# Patient Record
Sex: FEMALE | Race: WHITE | NOT HISPANIC OR LATINO | Employment: OTHER | URBAN - METROPOLITAN AREA
[De-identification: names, ages, dates, MRNs, and addresses within clinical notes are randomized per-mention and may not be internally consistent; named-entity substitution may affect disease eponyms.]

---

## 2017-01-18 ENCOUNTER — OFFICE VISIT (OUTPATIENT)
Dept: FAMILY MEDICINE CLINIC | Facility: CLINIC | Age: 72
End: 2017-01-18

## 2017-01-18 VITALS
HEIGHT: 65 IN | BODY MASS INDEX: 42.49 KG/M2 | WEIGHT: 255 LBS | TEMPERATURE: 98.1 F | OXYGEN SATURATION: 92 % | HEART RATE: 48 BPM | DIASTOLIC BLOOD PRESSURE: 70 MMHG | SYSTOLIC BLOOD PRESSURE: 110 MMHG

## 2017-01-18 DIAGNOSIS — I10 ESSENTIAL HYPERTENSION: ICD-10-CM

## 2017-01-18 DIAGNOSIS — Z86.79 HISTORY OF ATRIAL FIBRILLATION: ICD-10-CM

## 2017-01-18 DIAGNOSIS — N28.9 RENAL INSUFFICIENCY: ICD-10-CM

## 2017-01-18 DIAGNOSIS — G47.33 OSA (OBSTRUCTIVE SLEEP APNEA): ICD-10-CM

## 2017-01-18 DIAGNOSIS — E78.2 MIXED HYPERLIPIDEMIA: ICD-10-CM

## 2017-01-18 DIAGNOSIS — E03.9 ACQUIRED HYPOTHYROIDISM: Primary | ICD-10-CM

## 2017-01-18 PROBLEM — E78.5 HYPERLIPIDEMIA: Status: ACTIVE | Noted: 2017-01-18

## 2017-01-18 PROBLEM — I48.0 PAF (PAROXYSMAL ATRIAL FIBRILLATION): Status: ACTIVE | Noted: 2017-01-18

## 2017-01-18 PROCEDURE — 99214 OFFICE O/P EST MOD 30 MIN: CPT | Performed by: PHYSICIAN ASSISTANT

## 2017-01-18 RX ORDER — EZETIMIBE 10 MG/1
10 TABLET ORAL DAILY
Qty: 90 TABLET | Refills: 3 | Status: SHIPPED | OUTPATIENT
Start: 2017-01-18 | End: 2018-01-02 | Stop reason: SDUPTHER

## 2017-01-18 RX ORDER — LEVOTHYROXINE SODIUM 0.05 MG/1
50 TABLET ORAL DAILY
Qty: 90 TABLET | Refills: 3 | Status: SHIPPED | OUTPATIENT
Start: 2017-01-18 | End: 2018-01-02 | Stop reason: SDUPTHER

## 2017-01-18 RX ORDER — METOPROLOL SUCCINATE 25 MG/1
TABLET, EXTENDED RELEASE ORAL
Qty: 30 TABLET | Refills: 0
Start: 2017-01-18 | End: 2017-06-08 | Stop reason: SDUPTHER

## 2017-01-18 RX ORDER — ASPIRIN 81 MG/1
81 TABLET ORAL DAILY
COMMUNITY
End: 2022-12-02

## 2017-01-18 RX ORDER — OMEPRAZOLE 40 MG/1
40 CAPSULE, DELAYED RELEASE ORAL DAILY
Qty: 90 CAPSULE | Refills: 3 | Status: SHIPPED | OUTPATIENT
Start: 2017-01-18 | End: 2018-01-02 | Stop reason: SDUPTHER

## 2017-01-18 NOTE — PROGRESS NOTES
Subjective   Jessica Ames is a 71 y.o. female.     History of Present Illness   Jessica Ames 71 y.o. female who presents today for routine follow up check and medication refills.  she has a history of   Patient Active Problem List   Diagnosis   • Hypertension   • Hyperlipidemia   • ERIC (obstructive sleep apnea)   • PAF (paroxysmal atrial fibrillation)   .  Since the last visit, she has overall felt well.  She has Hypertenision and is well controlled on medication, GERD and is well controlled on PPI medication and Atrial Fibrillation and remains under the care of their cardiologist for medical management.  she has been compliant with current medications have reviewed them.  The patient denies medication side effects.  I must update labs      Has Osteopenia 10-15 and cannot do Evista d/t stroke risk;  Will need DEXA Oct and then if develops Osteoporosis will consider Prolia.    She is seeing Dr Bowman for anemia and has appt next week    Use Cpap/Bipap every night  She is on beta blocker for rate control and bp  I need to update labs    Had Afib June 18 and Nov 11.  The last note from cardio said to let me know if any a fib.  She was on Xarelto.  She is on ASA for now.  I am going to contact Dr. Adame and see if need appt/Xarelto  Lab Results   Component Value Date    HGBA1C 6.2 (H) 01/11/2016     Lab Results   Component Value Date    TSH 2.53 11/25/2015     Lab Results   Component Value Date    GLUCOSE 130 (H) 11/25/2015    BUN 12 01/11/2016    CREATININE 0.83 01/11/2016    EGFRIFNONA 72 01/11/2016    EGFRIFAFRI 83 01/11/2016    BCR 14 01/11/2016    CO2 22 01/11/2016    CALCIUM 8.8 01/11/2016    PROTENTOTREF 6.7 01/11/2016    ALBUMIN 4.2 01/11/2016    LABIL2 1.7 01/11/2016    AST 16 01/11/2016    ALT 14 01/11/2016     I did just call and talked to spouse per BY[facility] about Dr Adame contacting me back and lowered Toprol to 1/2 tab daily d/t bradycardia.  The following portions of the patient's history were reviewed  and updated as appropriate: allergies, current medications, past family history, past medical history, past social history, past surgical history and problem list.    Review of Systems   Constitutional: Negative for activity change, appetite change and unexpected weight change.   HENT: Negative for nosebleeds and trouble swallowing.    Eyes: Negative for pain and visual disturbance.   Respiratory: Negative for chest tightness, shortness of breath and wheezing.    Cardiovascular: Negative for chest pain and palpitations.   Gastrointestinal: Negative for abdominal pain and blood in stool.   Endocrine: Negative.    Genitourinary: Negative for difficulty urinating and hematuria.   Musculoskeletal: Negative for joint swelling.   Skin: Negative for color change and rash.   Allergic/Immunologic: Negative.    Neurological: Negative for syncope and speech difficulty.   Hematological: Negative for adenopathy.   Psychiatric/Behavioral: Negative for agitation and confusion.   All other systems reviewed and are negative.      Objective   Physical Exam   Constitutional: She is oriented to person, place, and time. She appears well-developed and well-nourished. No distress.   HENT:   Head: Normocephalic and atraumatic.   Eyes: Conjunctivae and EOM are normal. Pupils are equal, round, and reactive to light. Right eye exhibits no discharge. Left eye exhibits no discharge. No scleral icterus.   Neck: Normal range of motion. Neck supple. No tracheal deviation present. No thyromegaly present.   Cardiovascular: Normal rate, regular rhythm, normal heart sounds, intact distal pulses and normal pulses.  Exam reveals no gallop.    No murmur heard.  Trace pedal edema = bilat   Pulmonary/Chest: Effort normal and breath sounds normal. No respiratory distress. She has no wheezes. She has no rales.   Musculoskeletal: Normal range of motion.   Lymphadenopathy:     She has no cervical adenopathy.   Neurological: She is alert and oriented to person,  place, and time. She exhibits normal muscle tone. Coordination normal.   Skin: Skin is warm. No rash noted. No erythema. No pallor.   Psychiatric: She has a normal mood and affect. Her behavior is normal. Judgment and thought content normal.   Nursing note and vitals reviewed.      Assessment/Plan   Problems Addressed this Visit        Cardiovascular and Mediastinum    Hypertension    Relevant Orders    Comprehensive metabolic panel    eGFR-Glomerular Filtration    Hemoglobin A1c    Lipid Panel    T4, Free    TSH    Hyperlipidemia    Relevant Medications    ezetimibe (ZETIA) 10 MG tablet    Other Relevant Orders    Comprehensive metabolic panel    eGFR-Glomerular Filtration    Hemoglobin A1c    Lipid Panel    T4, Free    TSH       Respiratory    ERIC (obstructive sleep apnea)    Relevant Orders    Comprehensive metabolic panel    eGFR-Glomerular Filtration    Hemoglobin A1c    Lipid Panel    T4, Free    TSH      Other Visit Diagnoses     Acquired hypothyroidism    -  Primary    Relevant Medications    levothyroxine (SYNTHROID, LEVOTHROID) 50 MCG tablet    Other Relevant Orders    Comprehensive metabolic panel    eGFR-Glomerular Filtration    Hemoglobin A1c    Lipid Panel    T4, Free    TSH    History of atrial fibrillation

## 2017-01-18 NOTE — MR AVS SNAPSHOT
Jessica Ames   1/18/2017 11:30 AM   Office Visit    Provider:  Hortencia Jackman PA-C   Department:  [facility]   Dept Phone:  [phone]                Your Full Care Plan              Today's Medication Changes          These changes are accurate as of: 1/18/17 12:18 PM.  If you have any questions, ask your nurse or doctor.               Medication(s)that have changed:     ezetimibe 10 MG tablet   Commonly known as:  ZETIA   Take 1 tablet by mouth Daily. For cholesterol   What changed:  See the new instructions.   Changed by:  Hortencia Jackman PA-C       levothyroxine 50 MCG tablet   Commonly known as:  SYNTHROID, LEVOTHROID   Take 1 tablet by mouth Daily. One PO daily for thyroid   What changed:  See the new instructions.   Changed by:  Hortencia Jackman PA-C       omeprazole 40 MG capsule   Commonly known as:  priLOSEC   Take 1 capsule by mouth Daily. For GERD   What changed:  See the new instructions.   Changed by:  Hortencia Jackman PA-C         Stop taking medication(s)listed here:     rivaroxaban 20 MG tablet   Commonly known as:  XARELTO   Stopped by:  Hortencia Jackman PA-C           Vitamin D3 2000 UNITS capsule   Stopped by:  Hortencia Jackman PA-C                Where to Get Your Medications      These medications were sent to [facility] - [place], [place] - [address] [place] [address] - [phone] [facility] [phone] [address] [address]     Phone:  [phone]     ezetimibe 10 MG tablet    levothyroxine 50 MCG tablet    omeprazole 40 MG capsule                  Your Updated Medication List          This list is accurate as of: 1/18/17 12:18 PM.  Always use your most recent med list.                aspirin 81 MG EC tablet       bimatoprost 0.01 % ophthalmic drops   Commonly known as:  LUMIGAN       ezetimibe 10 MG tablet   Commonly known as:  ZETIA   Take 1 tablet by mouth Daily. For cholesterol       Iron 28 MG tablet       levothyroxine 50 MCG tablet   Commonly known as:  SYNTHROID, LEVOTHROID   Take 1 tablet by mouth Daily. One PO daily for thyroid       metoprolol succinate XL 25 MG 24 hr tablet   Commonly known as:  TOPROL-XL   Take 1 tablet by mouth daily.       omeprazole 40 MG capsule   Commonly known as:  priLOSEC   Take 1 capsule by mouth Daily. For GERD               We Performed the Following     Comprehensive metabolic panel     eGFR-Glomerular Filtration     Hemoglobin A1c     Lipid Panel     T4, Free     TSH       You Were Diagnosed With        Codes Comments    Acquired hypothyroidism    -  Primary ICD-10-CM: E03.9  ICD-9-CM: 244.9     Essential hypertension     ICD-10-CM: I10  ICD-9-CM: 401.9     ERIC (obstructive sleep apnea)     ICD-10-CM: G47.33  ICD-9-CM: 327.23     Mixed hyperlipidemia     ICD-10-CM: E78.2  ICD-9-CM: 272.2     History of atrial fibrillation     ICD-10-CM: Z86.79  ICD-9-CM: V12.59       Instructions    Low glycemic index diet  Exercise 30 minutes most days of the week  Make sure you get results on any labs or tests we ordered today  We discussed medications and how to take them as prescribed  Sleep 6-8 hours each night if possible  If you have not signed up for [employer], please activate your code ASAP from your After Visit Summary today    LDL goal <100  LDL goal if heart disease <70  HDL goal >60  Triglyceride goal <150  BP goal =<130/80  Fasting glucose <100         Patient Instructions History      [employer] Signup     [facility] [employer] allows you to send messages to your doctor, view your test results, renew your prescriptions, schedule appointments, and more. To sign up, go to [employer] and click on the Sign Up Now link in the New User? box. Enter your [employer] Activation Code exactly as it appears below along with the last four digits of your Social Security Number and your Date of Birth () to complete the sign-up process. If you do not sign up before the expiration date,  "you must request a new code.    [employer] Activation Code: [email or web address]  Expires: 2/1/2017 12:18 PM    If you have questions, you can email [email or web address]) or call [phone] to talk to our [employer] staff. Remember, [employer] is NOT to be used for urgent needs. For medical emergencies, dial 911.               Other Info from Your Visit           Allergies     Adenosine      Celecoxib      Naproxen      Nutritional Supplements      Risedronate      Risedronate Sodium      Shellfish-derived Products      Sulfa Antibiotics        Reason for Visit     Hypothyroidism           Vital Signs     Blood Pressure Pulse Temperature Height Weight Oxygen Saturation    110/70 (BP Location: Left arm, Patient Position: Sitting, Cuff Size: Large Adult) 48 98.1 °F (36.7 °C) (Oral) 65\" (165.1 cm) 255 lb (116 kg) 92%    Body Mass Index Smoking Status                42.43 kg/m2 Never Smoker          Problems and Diagnoses Noted     High cholesterol or triglycerides    High blood pressure    ERIC (obstructive sleep apnea)    Atrial fibrillation (irregular heartbeat)    Acquired underactive thyroid    -  Primary    History of atrial fibrillation          "

## 2017-01-18 NOTE — PATIENT INSTRUCTIONS
Low glycemic index diet  Exercise 30 minutes most days of the week  Make sure you get results on any labs or tests we ordered today  We discussed medications and how to take them as prescribed  Sleep 6-8 hours each night if possible  If you have not signed up for [employer], please activate your code ASAP from your After Visit Summary today    LDL goal <100  LDL goal if heart disease <70  HDL goal >60  Triglyceride goal <150  BP goal =<130/80  Fasting glucose <100

## 2017-01-20 LAB
ALBUMIN SERPL-MCNC: 4.5 G/DL (ref 3.5–5.2)
ALBUMIN/GLOB SERPL: 2 G/DL
ALP SERPL-CCNC: 101 U/L (ref 39–117)
ALT SERPL-CCNC: 22 U/L (ref 1–33)
AST SERPL-CCNC: 20 U/L (ref 1–32)
BILIRUB SERPL-MCNC: 0.6 MG/DL (ref 0.1–1.2)
BUN SERPL-MCNC: 19 MG/DL (ref 8–23)
BUN/CREAT SERPL: 20.4 (ref 7–25)
CALCIUM SERPL-MCNC: 9.3 MG/DL (ref 8.6–10.5)
CHLORIDE SERPL-SCNC: 102 MMOL/L (ref 98–107)
CHOLEST SERPL-MCNC: 215 MG/DL (ref 0–200)
CO2 SERPL-SCNC: 26.9 MMOL/L (ref 22–29)
CREAT SERPL-MCNC: 0.93 MG/DL (ref 0.57–1)
GLOBULIN SER CALC-MCNC: 2.3 GM/DL
GLUCOSE SERPL-MCNC: 113 MG/DL (ref 65–99)
HBA1C MFR BLD: 5.6 % (ref 4.8–5.6)
HDLC SERPL-MCNC: 42 MG/DL (ref 40–60)
LDLC SERPL CALC-MCNC: 133 MG/DL (ref 0–100)
POTASSIUM SERPL-SCNC: 4.2 MMOL/L (ref 3.5–5.2)
PROT SERPL-MCNC: 6.8 G/DL (ref 6–8.5)
SODIUM SERPL-SCNC: 142 MMOL/L (ref 136–145)
T4 FREE SERPL-MCNC: 1.33 NG/DL (ref 0.93–1.7)
TRIGL SERPL-MCNC: 201 MG/DL (ref 0–150)
TSH SERPL DL<=0.005 MIU/L-ACNC: 2.86 MIU/ML (ref 0.27–4.2)
VLDLC SERPL CALC-MCNC: 40.2 MG/DL (ref 5–40)

## 2017-03-05 ENCOUNTER — RESULTS ENCOUNTER (OUTPATIENT)
Dept: FAMILY MEDICINE CLINIC | Facility: CLINIC | Age: 72
End: 2017-03-05

## 2017-03-05 DIAGNOSIS — G47.33 OSA (OBSTRUCTIVE SLEEP APNEA): ICD-10-CM

## 2017-03-05 DIAGNOSIS — E78.2 MIXED HYPERLIPIDEMIA: ICD-10-CM

## 2017-03-05 DIAGNOSIS — N28.9 RENAL INSUFFICIENCY: ICD-10-CM

## 2017-03-05 DIAGNOSIS — I10 ESSENTIAL HYPERTENSION: ICD-10-CM

## 2017-03-05 DIAGNOSIS — Z86.79 HISTORY OF ATRIAL FIBRILLATION: ICD-10-CM

## 2017-03-05 DIAGNOSIS — E03.9 ACQUIRED HYPOTHYROIDISM: ICD-10-CM

## 2017-03-06 LAB
BUN SERPL-MCNC: 12 MG/DL (ref 8–23)
BUN/CREAT SERPL: 16 (ref 7–25)
CALCIUM SERPL-MCNC: 9.2 MG/DL (ref 8.6–10.5)
CHLORIDE SERPL-SCNC: 102 MMOL/L (ref 98–107)
CO2 SERPL-SCNC: 26.3 MMOL/L (ref 22–29)
CREAT SERPL-MCNC: 0.75 MG/DL (ref 0.57–1)
GLUCOSE SERPL-MCNC: 108 MG/DL (ref 65–99)
POTASSIUM SERPL-SCNC: 4.3 MMOL/L (ref 3.5–5.2)
SODIUM SERPL-SCNC: 141 MMOL/L (ref 136–145)

## 2017-05-04 ENCOUNTER — TELEPHONE (OUTPATIENT)
Dept: CARDIOLOGY | Facility: CLINIC | Age: 72
End: 2017-05-04

## 2017-06-08 RX ORDER — METOPROLOL SUCCINATE 25 MG/1
12.5 TABLET, EXTENDED RELEASE ORAL DAILY
Qty: 45 TABLET | Refills: 0 | Status: SHIPPED | OUTPATIENT
Start: 2017-06-08 | End: 2017-09-11 | Stop reason: ALTCHOICE

## 2017-06-28 DIAGNOSIS — Z12.31 ENCOUNTER FOR SCREENING MAMMOGRAM FOR BREAST CANCER: Primary | ICD-10-CM

## 2017-08-17 ENCOUNTER — TELEPHONE (OUTPATIENT)
Dept: CARDIOLOGY | Facility: CLINIC | Age: 72
End: 2017-08-17

## 2017-08-17 ENCOUNTER — OFFICE VISIT (OUTPATIENT)
Dept: FAMILY MEDICINE CLINIC | Facility: CLINIC | Age: 72
End: 2017-08-17

## 2017-08-17 VITALS
DIASTOLIC BLOOD PRESSURE: 84 MMHG | SYSTOLIC BLOOD PRESSURE: 130 MMHG | HEIGHT: 65 IN | OXYGEN SATURATION: 98 % | TEMPERATURE: 98.4 F | BODY MASS INDEX: 44.65 KG/M2 | RESPIRATION RATE: 16 BRPM | HEART RATE: 53 BPM | WEIGHT: 268 LBS

## 2017-08-17 DIAGNOSIS — I10 ESSENTIAL HYPERTENSION: ICD-10-CM

## 2017-08-17 DIAGNOSIS — G47.33 OSA (OBSTRUCTIVE SLEEP APNEA): ICD-10-CM

## 2017-08-17 DIAGNOSIS — R94.31 ABNORMAL EKG: ICD-10-CM

## 2017-08-17 DIAGNOSIS — D68.51 FACTOR V LEIDEN (HCC): ICD-10-CM

## 2017-08-17 DIAGNOSIS — E78.2 MIXED HYPERLIPIDEMIA: ICD-10-CM

## 2017-08-17 DIAGNOSIS — Z86.79 HISTORY OF ATRIAL FIBRILLATION: ICD-10-CM

## 2017-08-17 DIAGNOSIS — I48.0 PAF (PAROXYSMAL ATRIAL FIBRILLATION) (HCC): ICD-10-CM

## 2017-08-17 DIAGNOSIS — Z01.818 PREOPERATIVE CLEARANCE: Primary | ICD-10-CM

## 2017-08-17 DIAGNOSIS — R73.01 IMPAIRED FASTING GLUCOSE: ICD-10-CM

## 2017-08-17 PROCEDURE — 99214 OFFICE O/P EST MOD 30 MIN: CPT | Performed by: PHYSICIAN ASSISTANT

## 2017-08-17 PROCEDURE — 93000 ELECTROCARDIOGRAM COMPLETE: CPT | Performed by: PHYSICIAN ASSISTANT

## 2017-08-17 NOTE — PROGRESS NOTES
Subjective   Jessica Ames is a 72 y.o. female.     History of Present Illness   Pre-Op Evaluation  Jessica Ames is a 72 y.o. female who presents to the office today for a preoperative consultation at the request of surgeon DR Matthews who plans on performing hand surgery and remove thumb joint right on August 31. This consultation is requested for the specific conditions prompting preoperative evaluation (i.e. because of potential affect on operative risk): hx of PAF and abd EKG hx with LAD.. Planned anesthesia is not sure yet. The patient has the following known anesthesia issues: no past complications. Patient has a bleeding risk of: no recent abnormal bleeding. Patient does not have objections to receiving blood products if needed.   She sees Dr. 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 Bowman;---will need clearance for surgery from DR Bowman also d/t Factor V and hx PE    Some tremor in fingers;  Wants to wait to see neurologist      ECG 12 Lead  Date/Time: 8/17/2017 10:45 AM  Performed by: MARLI RESTREPO  Authorized by: MARLI RESTREPO   Comparison: compared with previous ECG from 4/13/2016  Rhythm: sinus bradycardia  Rate: normal  Conduction: conduction normal  ST Segments: ST segments normal  T depression: aVL  T flattening: V6  QRS axis: left  Other findings: early repolarization  Q waves: II  Clinical impression: abnormal ECG  Comments: EKG Interpretation Report    Heart rate:    48 beats/min, ID interval:  210 msec, QRS duration:  132 msec  QTu:444 msec, QTc:  397 msec            Jessica Ames 72 y.o. female who presents today for update on labs.  On Cpap for sleep apnea, hx PAF, hx impaired fasting glucose, hyperlipidemia, HTN., GERD Hypothyroidism;  She has had DVT and PE.  she has  a history of   Patient Active Problem List   Diagnosis   • Hypertension   • Hyperlipidemia   • ERIC (obstructive sleep apnea)   • PAF (paroxysmal atrial fibrillation)   • Impaired fasting glucose   .      Last free T4 was 1.33  Lab Results   Component Value Date    TSH 2.860 01/20/2017     Lab Results   Component Value Date    HGBA1C 5.60 01/20/2017     Lab Results   Component Value Date    GLUCOSE 130 (H) 11/25/2015    CALCIUM 9.2 03/06/2017     03/06/2017    K 4.3 03/06/2017    CO2 26.3 03/06/2017     03/06/2017    BUN 12 03/06/2017    CREATININE 0.75 03/06/2017    EGFRIFAFRI 92 03/06/2017    EGFRIFNONA 76 03/06/2017    BCR 16.0 03/06/2017     Weight is up and no changes;  I will update labs for glucose and thyroid  The following portions of the patient's history were reviewed and updated as appropriate: allergies, current medications, past family history, past medical history, past social history, past surgical history and problem list.        The following portions of the patient's history were reviewed and updated as appropriate: allergies, current medications, past family history, past medical history, past social history, past surgical history and problem list.    Review of Systems   Constitutional: Positive for unexpected weight change. Negative for activity change and appetite change.   HENT: Negative for nosebleeds and trouble swallowing.    Eyes: Negative for pain and visual disturbance.   Respiratory: Negative for chest tightness, shortness of breath and wheezing.    Cardiovascular: Negative for chest pain and palpitations.   Gastrointestinal: Negative for abdominal pain and blood in stool.   Endocrine: Negative.    Genitourinary: Negative for difficulty urinating and hematuria.   Musculoskeletal: Positive for arthralgias. Negative for joint swelling.   Skin: Negative for color change and rash.   Allergic/Immunologic: Negative.    Neurological: Negative for syncope and speech difficulty.    Hematological: Negative for adenopathy.   Psychiatric/Behavioral: Negative for agitation and confusion.   All other systems reviewed and are negative.      Objective   Physical Exam   Constitutional: She is oriented to person, place, and time. She appears well-developed and well-nourished. No distress.   HENT:   Head: Normocephalic and atraumatic.   Eyes: Conjunctivae and EOM are normal. Pupils are equal, round, and reactive to light. Right eye exhibits no discharge. Left eye exhibits no discharge. No scleral icterus.   Neck: Normal range of motion. Neck supple. No tracheal deviation present. No thyromegaly present.   Cardiovascular: Normal rate, regular rhythm, normal heart sounds, intact distal pulses and normal pulses.  Exam reveals no gallop.    No murmur heard.  Trace pedal edema = bilat   Pulmonary/Chest: Effort normal and breath sounds normal. No respiratory distress. She has no wheezes. She has no rales.   Musculoskeletal: Normal range of motion.   Neurological: She is alert and oriented to person, place, and time. She exhibits normal muscle tone. Coordination normal.   Skin: Skin is warm. No rash noted. No erythema. No pallor.   Psychiatric: She has a normal mood and affect. Her behavior is normal. Judgment and thought content normal.   Nursing note and vitals reviewed.      Assessment/Plan   Jessica was seen today for pre-op exam.    Diagnoses and all orders for this visit:    Preoperative clearance  -     ECG 12 Lead  -     Cancel: Ambulatory Referral to Cardiology  -     Comprehensive Metabolic Panel  -     T4, Free  -     TSH  -     Hemoglobin A1c  -     Ambulatory Referral to Cardiology    Abnormal EKG  -     ECG 12 Lead  -     Cancel: Ambulatory Referral to Cardiology  -     Comprehensive Metabolic Panel  -     T4, Free  -     TSH  -     Hemoglobin A1c  -     Ambulatory Referral to Cardiology    History of atrial fibrillation  -     ECG 12 Lead  -     Cancel: Ambulatory Referral to Cardiology  -      Comprehensive Metabolic Panel  -     T4, Free  -     TSH  -     Hemoglobin A1c  -     Ambulatory Referral to Cardiology    Essential hypertension  -     Comprehensive Metabolic Panel  -     T4, Free  -     TSH  -     Hemoglobin A1c    ERIC (obstructive sleep apnea)  -     Comprehensive Metabolic Panel  -     T4, Free  -     TSH  -     Hemoglobin A1c    PAF (paroxysmal atrial fibrillation)  -     Comprehensive Metabolic Panel  -     T4, Free  -     TSH  -     Hemoglobin A1c    Mixed hyperlipidemia  -     Comprehensive Metabolic Panel  -     T4, Free  -     TSH  -     Hemoglobin A1c    Impaired fasting glucose  -     Comprehensive Metabolic Panel  -     T4, Free  -     TSH  -     Hemoglobin A1c    Factor V Leiden  -     Ambulatory Referral to Hematology / Oncology  -     Comprehensive Metabolic Panel  -     T4, Free  -     TSH  -     Hemoglobin A1c

## 2017-08-17 NOTE — PATIENT INSTRUCTIONS
Refer to cardio  Refer to hematologist---------need surgical clearance  Low glycemic index diet  Exercise 30 minutes most days of the week  Make sure you get results on any labs or tests we ordered today  We discussed medications and how to take them as prescribed  Sleep 6-8 hours each night if possible  If you have not signed up for [employer], please activate your code ASAP from your After Visit Summary today    LDL goal <100  LDL goal if heart disease <70  HDL goal >60  Triglyceride goal <150  BP goal =<130/80  Fasting glucose <100

## 2017-08-17 NOTE — TELEPHONE ENCOUNTER
Pt is scheduled to have surgery on her hand 8/31. Her PCP wanted her to see you before for cardiac clearance. I had her scheduled with TK on 8/23 @ 10am...Ilene

## 2017-08-18 LAB
ALBUMIN SERPL-MCNC: 4.4 G/DL (ref 3.5–5.2)
ALBUMIN/GLOB SERPL: 1.8 G/DL
ALP SERPL-CCNC: 92 U/L (ref 39–117)
ALT SERPL-CCNC: 23 U/L (ref 1–33)
AST SERPL-CCNC: 18 U/L (ref 1–32)
BILIRUB SERPL-MCNC: 0.6 MG/DL (ref 0.1–1.2)
BUN SERPL-MCNC: 14 MG/DL (ref 8–23)
BUN/CREAT SERPL: 16.3 (ref 7–25)
CALCIUM SERPL-MCNC: 9.9 MG/DL (ref 8.6–10.5)
CHLORIDE SERPL-SCNC: 106 MMOL/L (ref 98–107)
CO2 SERPL-SCNC: 25.3 MMOL/L (ref 22–29)
CREAT SERPL-MCNC: 0.86 MG/DL (ref 0.57–1)
GLOBULIN SER CALC-MCNC: 2.4 GM/DL
GLUCOSE SERPL-MCNC: 125 MG/DL (ref 65–99)
HBA1C MFR BLD: 6.23 % (ref 4.8–5.6)
POTASSIUM SERPL-SCNC: 4.6 MMOL/L (ref 3.5–5.2)
PROT SERPL-MCNC: 6.8 G/DL (ref 6–8.5)
SODIUM SERPL-SCNC: 144 MMOL/L (ref 136–145)
T4 FREE SERPL-MCNC: 1.38 NG/DL (ref 0.93–1.7)
TSH SERPL DL<=0.005 MIU/L-ACNC: 2.72 MIU/ML (ref 0.27–4.2)

## 2017-08-22 NOTE — PROGRESS NOTES
Called patient and advised of results.   Patient verbalized understanding.  Mailing copy of test results to pt

## 2017-08-23 ENCOUNTER — OFFICE VISIT (OUTPATIENT)
Dept: CARDIOLOGY | Facility: CLINIC | Age: 72
End: 2017-08-23

## 2017-08-23 VITALS
WEIGHT: 265 LBS | SYSTOLIC BLOOD PRESSURE: 140 MMHG | BODY MASS INDEX: 44.1 KG/M2 | DIASTOLIC BLOOD PRESSURE: 88 MMHG | HEART RATE: 48 BPM

## 2017-08-23 DIAGNOSIS — I10 ESSENTIAL HYPERTENSION: ICD-10-CM

## 2017-08-23 DIAGNOSIS — Z01.810 ENCOUNTER FOR PRE-OPERATIVE CARDIOVASCULAR CLEARANCE: Primary | ICD-10-CM

## 2017-08-23 DIAGNOSIS — R94.31 ABNORMAL EKG: ICD-10-CM

## 2017-08-23 DIAGNOSIS — I48.0 PAROXYSMAL ATRIAL FIBRILLATION (HCC): ICD-10-CM

## 2017-08-23 PROCEDURE — 99242 OFF/OP CONSLTJ NEW/EST SF 20: CPT | Performed by: NURSE PRACTITIONER

## 2017-08-23 PROCEDURE — 93000 ELECTROCARDIOGRAM COMPLETE: CPT | Performed by: NURSE PRACTITIONER

## 2017-08-23 RX ORDER — AMINO ACIDS/MV,IRON,MIN
TABLET ORAL DAILY
COMMUNITY
End: 2018-09-25 | Stop reason: ALTCHOICE

## 2017-08-23 NOTE — PROGRESS NOTES
Date of Office Visit: 2017  Encounter Provider: MIRA Haynes  Place of Service: [facility] CARDIOLOGY  Patient Name: Jessica Ames  :1945    Chief Complaint   Patient presents with   • Pre-op Exam   :     HPI: Jessica Ames is a 72 y.o. female who presents today for perioperative cardiac risk assessment. This visit is a consultation requested by Hortencia Jackman PA-C. Mrs. Ames is scheduled to undergo hand/thumb surgery on  by Dr. Matthews. She has a past medical history of anemia, thyroid disease, DVT, pulmonary emboli, Factor V Ledien, GERD, hypertension, hyperlipidemia, sleep apnea, and paroxysmal atrial fibrillation. She is an established patient of Dr. Charlie Adame of our practice and was last seen in the office on 2016. On that visit, she did not have recurrent atrial fibrillation and the Xarelto was discontinued and was recommended to start a baby aspirin.     She denies any sustained episodes of palpitations. She occasionally has brief fluttering sensation daily that lasts a few seconds with no other associated symptoms. She has chronic shortness of breath, unchanged and experiences dizziness on a rare occasion. She denies chest pain, paroxysmal nocturnal dyspnea, orthopnea, cough, wheezing, edema, syncopal episodes, and falls.    Past Medical History:   Diagnosis Date   • Disease of thyroid gland    • DVT (deep venous thrombosis) 2013    right leg; s/p knee replacement; was on xarelto and now baby aspirin; followed by Dr. Parminder Mejia     • Factor V Leiden    • First degree AV block 2012   • GERD (gastroesophageal reflux disease) 2012   • Hiatal hernia    • Hyperlipidemia    • Hypertension    • IFG (impaired fasting glucose)    • Iron deficiency anemia     sees Dr. Clem Bowman    • Left anterior fascicular block 2012   • Lower extremity edema    • Obesity 2012   • ERIC (obstructive sleep apnea)     compliant with CPAP machine    •  "PAF (paroxysmal atrial fibrillation) 11/2015    day after colonoscopy went into atrial fibrillation; was on Xarelto and now baby aspirin    • Pulmonary embolism 2013    also had DVT    • PVC's (premature ventricular contractions) 12/7/2012   • Venous insufficiency     followed by Dr. Parminder Mejia        Past Surgical History:   Procedure Laterality Date   • BREAST SURGERY      reduction   • CATH LAB PROCEDURE     • HAND SURGERY     • HYSTERECTOMY     • ROTATOR CUFF REPAIR     • TOTAL KNEE ARTHROPLASTY         Social History     Social History   • Marital status:      Spouse name: N/A   • Number of children: N/A   • Years of education: N/A     Occupational History   • Not on file.     Social History Main Topics   • Smoking status: Never Smoker   • Smokeless tobacco: Never Used   • Alcohol use Yes      Comment: \" once month\"   • Drug use: No   • Sexual activity: Defer     Other Topics Concern   • Not on file     Social History Narrative       Family History   Problem Relation Age of Onset   • Stroke Mother    • Diabetes Mother    • Hypertension Mother    • Uterine cancer Mother    • Hypertension Father    • Heart attack Father    • Emphysema Father    • Diabetes Sister    • Hypertension Sister    • Stroke Brother    • Diabetes Brother    • Hypertension Brother        Review of Systems   Constitution: Positive for malaise/fatigue and weight gain (10 pounds). Negative for chills, diaphoresis, fever, night sweats and weight loss.   HENT: Negative for hearing loss, nosebleeds, sore throat and tinnitus.    Eyes: Negative for blurred vision, double vision, pain and visual disturbance.   Cardiovascular: Positive for dyspnea on exertion and leg swelling. Negative for chest pain, claudication, cyanosis, irregular heartbeat, near-syncope, orthopnea, palpitations, paroxysmal nocturnal dyspnea and syncope.   Respiratory: Negative for cough, hemoptysis, shortness of breath, snoring and wheezing.    Endocrine: Negative for " cold intolerance, heat intolerance and polyuria.   Hematologic/Lymphatic: Negative for bleeding problem. Does not bruise/bleed easily.   Skin: Negative for color change, dry skin, flushing and itching.   Musculoskeletal: Negative for falls, joint swelling, muscle cramps, muscle weakness and myalgias.   Gastrointestinal: Negative for abdominal pain, constipation, heartburn, melena, nausea and vomiting.   Genitourinary: Negative for dysuria and hematuria.   Neurological: Positive for excessive daytime sleepiness and dizziness (rare). Negative for light-headedness, loss of balance, numbness, paresthesias, seizures and vertigo.   Psychiatric/Behavioral: Negative for altered mental status, depression, memory loss and substance abuse. The patient does not have insomnia and is not nervous/anxious.    Allergic/Immunologic: Negative for environmental allergies.       Allergies   Allergen Reactions   • Adenosine    • Celecoxib    • Naproxen    • Nutritional Supplements    • Risedronate    • Risedronate Sodium    • Shellfish-Derived Products    • Sulfa Antibiotics          Current Outpatient Prescriptions:   •  aspirin 81 MG EC tablet, Take 81 mg by mouth Daily., Disp: , Rfl:   •  bimatoprost (LUMIGAN) 0.01 % ophthalmic drops, Apply  to eye., Disp: , Rfl:   •  ezetimibe (ZETIA) 10 MG tablet, Take 1 tablet by mouth Daily. For cholesterol, Disp: 90 tablet, Rfl: 3  •  levothyroxine (SYNTHROID, LEVOTHROID) 50 MCG tablet, Take 1 tablet by mouth Daily. One PO daily for thyroid, Disp: 90 tablet, Rfl: 3  •  metoprolol succinate XL (TOPROL-XL) 25 MG 24 hr tablet, Take 0.5 tablets by mouth Daily., Disp: 45 tablet, Rfl: 0  •  Multiple Vitamins-Minerals (OCUVITE EXTRA) tablet, Take  by mouth Daily., Disp: , Rfl:   •  omeprazole (priLOSEC) 40 MG capsule, Take 1 capsule by mouth Daily. For GERD, Disp: 90 capsule, Rfl: 3     Objective:     Vitals:    08/23/17 1021 08/23/17 1027   BP: 140/88    BP Location: Left arm    Pulse:  (!) 48    Weight: 265 lb (120 kg)      Body mass index is 44.1 kg/(m^2).    PHYSICAL EXAM:    Vitals Reviewed.   General Appearance: No acute distress, well developed and well nourished. Obese.   Eyes: Conjunctiva and lids: No erythema, swelling, or discharge. Sclera non-icteric.   HENT: Atraumatic, normocephalic. External eyes, ears, and nose normal. No hearing loss noted. Mucous membranes normal. Lips not cyanotic. Neck supple with no tenderness.  Respiratory: No signs of respiratory distress. Respiration rhythm and depth normal.   Clear to auscultation. No rales, crackles, rhonchi, or wheezing auscultated.   Cardiovascular:  Jugular Venous Pressure: Normal  Heart Rate and Rhythm: Normal, bradycardia. Heart Sounds: Normal S1 and S2. No S3 or S4 noted.  Murmurs: No murmurs noted. No rubs, thrills, or gallops.   Arterial Pulses: Carotid pulses normal. No carotid bruit noted. Posterior tibialis and dorsalis pedis pulses normal.   Lower Extremities: No edema noted.  Gastrointestinal:  Abdomen soft, non-distended, non-tender. Normal bowel sounds. No hepatomegaly.   Musculoskeletal: Normal movement of extremities  Skin and Nails: General appearance normal. No pallor, cyanosis, diaphoresis. Skin temperature normal. No clubbing of fingernails.   Psychiatric: Patient alert and oriented to person, place, and time. Speech and behavior appropriate. Normal mood and affect.       ECG 12 Lead  Date/Time: 8/23/2017 10:25 AM  Performed by: GERALD DOS SANTOS  Authorized by: GERALD DOS SANTOS   Comparison: compared with previous ECG from 8/17/2017  Rhythm: sinus bradycardia  Ectopy: atrial premature contractions  Rate: bradycardic  BPM: 48  Conduction: 1st degree and non-specific intraventricular conduction delay  ST Segments: ST segments normal  T Waves: T waves normal  QRS axis: normal  Clinical impression: abnormal ECG  Comments: Unchanged from 4/13/2016              Assessment:       Diagnosis Plan   1. Encounter for pre-operative  cardiovascular clearance     2. Paroxysmal atrial fibrillation     3. Abnormal EKG     4. Essential hypertension            Plan:       1. Perioperative Cardiac Risk Assessment: Mrs. Ames denies any chest pain or sustained palpitations. EKG shows sinus bradycardia. Patient is considered at acceptable risk for surgery from a cardiovascular standpoint. According to the Kaushal's Revised Cardiac Risk Index, patient is considered low risk (0.9%) of adverse cardiovascular events occurring with moderate risk surgery. I have recommended that she take the Toprol XL the am of surgery. She may hold the aspirin to your discretion. I explained there is a chance that she may go back into atrial fibrillation in the surgical period.     2. Atrial Fibrillation and Atrial Flutter  Assessment  • The patient has paroxysmal atrial fibrillation  • The patient's CHADS2-VASc score is 5  • A CTX5ND1-ELWo score of 2 or more is considered a high risk for a thromboembolic event    Plan  • Attempt to maintain sinus rhythm  • Continue aspirin for antithrombotic therapy, bleeding issues discussed  • Continue beta blocker for rhythm control    Subjective - Objective  • On her last visit with Dr. Adame, the Xarelto was discontinued and she was recommended to take baby aspirin. Her heart rate is low today, but she is asymptomatic. She will continue on Toprol at current dosage. I have asked her to call with any sustained palpitations or recurrent atrial fibrillation.     3. EKG: Her EKG has abnormalities as noted above under EKG interpretation; however, these are unchanged.     4. Essential Hypertension: Blood pressure is elevated today. I will defer to PCP.     5. Follow Up: She has been recommended to follow up on an as needed basis by Dr. Adame.     As always, it has been a pleasure to participate in your patient's care.      Sincerely,         MIRA Lamb

## 2017-08-29 NOTE — TELEPHONE ENCOUNTER
Hortencia from [facility] called regarding Ms. Ames.  She is scheduled for thumb joint surgery on 8/31/17.   They would like a cardiac clearance.  Ms. Ames is a pt of Dr. Adame's but Varsha saw this pt last week.  Varsha is out today and not sure if she will be back tomorrow.  Are you able to clear this pt or should I wait and check with Dr. Adame tomorrow when he is back?  I have the pt's chart if you need it with Varsha's notes. Thanks.    Please advise/viktoriya Burnett # [phone]

## 2017-08-30 ENCOUNTER — TELEPHONE (OUTPATIENT)
Dept: CARDIOLOGY | Facility: CLINIC | Age: 72
End: 2017-08-30

## 2017-08-30 NOTE — PROGRESS NOTES
I should have a form in my in basket on my desk for this.  She can hold the Asprin.  I already was sent a note from hematologist and cleared her.   I can make a letter if needs prior to my return Tues.  You could also send this note that she is cleared for surgery from primary care per cardiologist.

## 2017-08-30 NOTE — TELEPHONE ENCOUNTER
Varsha was able to dictate the office note and clearance for Ms. Hui.  I faxed over a copy of the letter to att: Connie at   Fax [phone]    Phone [phone]/[facility]

## 2017-09-11 ENCOUNTER — TELEPHONE (OUTPATIENT)
Dept: CARDIOLOGY | Facility: CLINIC | Age: 72
End: 2017-09-11

## 2017-09-11 RX ORDER — LOSARTAN POTASSIUM 25 MG/1
25 TABLET ORAL DAILY
Qty: 30 TABLET | Refills: 3 | Status: SHIPPED | OUTPATIENT
Start: 2017-09-11 | End: 2017-10-25 | Stop reason: SDUPTHER

## 2017-09-11 RX ORDER — METOPROLOL SUCCINATE 25 MG/1
25 TABLET, EXTENDED RELEASE ORAL DAILY
Qty: 30 TABLET | Refills: 11 | Status: SHIPPED | OUTPATIENT
Start: 2017-09-11 | End: 2017-09-12 | Stop reason: SDUPTHER

## 2017-09-11 RX ORDER — BENAZEPRIL HYDROCHLORIDE 5 MG/1
10 TABLET, FILM COATED ORAL DAILY
Qty: 30 TABLET | Refills: 3 | Status: SHIPPED | OUTPATIENT
Start: 2017-09-11 | End: 2017-09-11

## 2017-09-11 NOTE — TELEPHONE ENCOUNTER
Patient called, informing me that she was wandering if Varsha was going to change her Metoprolol. Patient said she has currently been taking Metoprolol ER 25mg 1/2 daily and her b/p is 153/60 hr 52.  Patient said she was advised to call after her surgery and Varsha might want to change to another b/p medication.  Please advise.  Olga    (Can you please address since Varsha is out.)

## 2017-09-11 NOTE — TELEPHONE ENCOUNTER
[facility] is calling, stating that the patient is allergic to Lisinopril, it's also on our list. The pt does not remember what her reaction to this is, but the pharmacist wanted to bring this to your attention. Also you sent in Benazepril 5 mg take 2 tablets a day, they have a 10 mg tablet if that's what you want to keep her on. Please advise. dmk

## 2017-09-11 NOTE — TELEPHONE ENCOUNTER
Pt informed, that we are changing her to Losartan, she is to continue Metoprolol as well. Called [facility] to cancel the Benazepril rx. dmk

## 2017-09-12 RX ORDER — METOPROLOL SUCCINATE 25 MG/1
25 TABLET, EXTENDED RELEASE ORAL DAILY
Qty: 90 TABLET | Refills: 1 | Status: SHIPPED | OUTPATIENT
Start: 2017-09-12 | End: 2018-02-22 | Stop reason: SDUPTHER

## 2017-10-25 RX ORDER — LOSARTAN POTASSIUM 25 MG/1
25 TABLET ORAL DAILY
Qty: 90 TABLET | Refills: 1 | Status: SHIPPED | OUTPATIENT
Start: 2017-10-25 | End: 2018-02-22 | Stop reason: SDUPTHER

## 2018-01-02 RX ORDER — LEVOTHYROXINE SODIUM 0.05 MG/1
TABLET ORAL
Qty: 90 TABLET | Refills: 0 | Status: SHIPPED | OUTPATIENT
Start: 2018-01-02 | End: 2018-02-22 | Stop reason: SDUPTHER

## 2018-01-02 RX ORDER — OMEPRAZOLE 40 MG/1
CAPSULE, DELAYED RELEASE ORAL
Qty: 90 CAPSULE | Refills: 0 | Status: SHIPPED | OUTPATIENT
Start: 2018-01-02 | End: 2018-02-22

## 2018-01-02 RX ORDER — EZETIMIBE 10 MG/1
TABLET ORAL
Qty: 90 TABLET | Refills: 0 | Status: SHIPPED | OUTPATIENT
Start: 2018-01-02 | End: 2018-02-22 | Stop reason: SDUPTHER

## 2018-02-15 DIAGNOSIS — K21.9 GASTROESOPHAGEAL REFLUX DISEASE WITHOUT ESOPHAGITIS: ICD-10-CM

## 2018-02-15 DIAGNOSIS — I10 ESSENTIAL HYPERTENSION: ICD-10-CM

## 2018-02-15 DIAGNOSIS — E55.9 VITAMIN D DEFICIENCY: ICD-10-CM

## 2018-02-15 DIAGNOSIS — R73.01 IMPAIRED FASTING GLUCOSE: ICD-10-CM

## 2018-02-15 DIAGNOSIS — E78.2 MIXED HYPERLIPIDEMIA: Primary | ICD-10-CM

## 2018-02-19 LAB
25(OH)D3+25(OH)D2 SERPL-MCNC: 16.7 NG/ML (ref 30–100)
ALBUMIN SERPL-MCNC: 4.2 G/DL (ref 3.5–4.8)
ALBUMIN/GLOB SERPL: 1.8 {RATIO} (ref 1.2–2.2)
ALP SERPL-CCNC: 95 IU/L (ref 39–117)
ALT SERPL-CCNC: 21 IU/L (ref 0–32)
AST SERPL-CCNC: 19 IU/L (ref 0–40)
BASOPHILS # BLD AUTO: 0 X10E3/UL (ref 0–0.2)
BASOPHILS NFR BLD AUTO: 0 %
BILIRUB SERPL-MCNC: 0.5 MG/DL (ref 0–1.2)
BUN SERPL-MCNC: 13 MG/DL (ref 8–27)
BUN/CREAT SERPL: 15 (ref 12–28)
CALCIUM SERPL-MCNC: 9.3 MG/DL (ref 8.7–10.3)
CHLORIDE SERPL-SCNC: 104 MMOL/L (ref 96–106)
CHOLEST SERPL-MCNC: 195 MG/DL (ref 100–199)
CO2 SERPL-SCNC: 23 MMOL/L (ref 18–29)
CREAT SERPL-MCNC: 0.84 MG/DL (ref 0.57–1)
EOSINOPHIL # BLD AUTO: 0 X10E3/UL (ref 0–0.4)
EOSINOPHIL NFR BLD AUTO: 0 %
ERYTHROCYTE [DISTWIDTH] IN BLOOD BY AUTOMATED COUNT: 14.8 % (ref 12.3–15.4)
GFR SERPLBLD CREATININE-BSD FMLA CKD-EPI: 70 ML/MIN/1.73
GFR SERPLBLD CREATININE-BSD FMLA CKD-EPI: 80 ML/MIN/1.73
GLOBULIN SER CALC-MCNC: 2.4 G/DL (ref 1.5–4.5)
GLUCOSE SERPL-MCNC: 121 MG/DL (ref 65–99)
HBA1C MFR BLD: 6 % (ref 4.8–5.6)
HCT VFR BLD AUTO: 40.9 % (ref 34–46.6)
HDLC SERPL-MCNC: 39 MG/DL
HGB BLD-MCNC: 13.7 G/DL (ref 11.1–15.9)
IMM GRANULOCYTES # BLD: 0 X10E3/UL (ref 0–0.1)
IMM GRANULOCYTES NFR BLD: 0 %
LDLC SERPL CALC-MCNC: 103 MG/DL (ref 0–99)
LYMPHOCYTES # BLD AUTO: 2.2 X10E3/UL (ref 0.7–3.1)
LYMPHOCYTES NFR BLD AUTO: 39 %
MCH RBC QN AUTO: 28.7 PG (ref 26.6–33)
MCHC RBC AUTO-ENTMCNC: 33.5 G/DL (ref 31.5–35.7)
MCV RBC AUTO: 86 FL (ref 79–97)
MONOCYTES # BLD AUTO: 0.5 X10E3/UL (ref 0.1–0.9)
MONOCYTES NFR BLD AUTO: 9 %
NEUTROPHILS # BLD AUTO: 2.9 X10E3/UL (ref 1.4–7)
NEUTROPHILS NFR BLD AUTO: 52 %
PLATELET # BLD AUTO: 153 X10E3/UL (ref 150–379)
POTASSIUM SERPL-SCNC: 4.1 MMOL/L (ref 3.5–5.2)
PROT SERPL-MCNC: 6.6 G/DL (ref 6–8.5)
RBC # BLD AUTO: 4.77 X10E6/UL (ref 3.77–5.28)
SODIUM SERPL-SCNC: 142 MMOL/L (ref 134–144)
T4 FREE SERPL-MCNC: 1.33 NG/DL (ref 0.82–1.77)
TRIGL SERPL-MCNC: 264 MG/DL (ref 0–149)
TSH SERPL DL<=0.005 MIU/L-ACNC: 3.06 UIU/ML (ref 0.45–4.5)
VLDLC SERPL CALC-MCNC: 53 MG/DL (ref 5–40)
WBC # BLD AUTO: 5.6 X10E3/UL (ref 3.4–10.8)

## 2018-02-22 ENCOUNTER — OFFICE VISIT (OUTPATIENT)
Dept: FAMILY MEDICINE CLINIC | Facility: CLINIC | Age: 73
End: 2018-02-22

## 2018-02-22 VITALS
SYSTOLIC BLOOD PRESSURE: 122 MMHG | DIASTOLIC BLOOD PRESSURE: 80 MMHG | HEIGHT: 65 IN | RESPIRATION RATE: 16 BRPM | TEMPERATURE: 98.3 F | HEART RATE: 51 BPM | BODY MASS INDEX: 44.65 KG/M2 | OXYGEN SATURATION: 97 % | WEIGHT: 268 LBS

## 2018-02-22 DIAGNOSIS — K21.9 GASTROESOPHAGEAL REFLUX DISEASE WITHOUT ESOPHAGITIS: ICD-10-CM

## 2018-02-22 DIAGNOSIS — I10 ESSENTIAL HYPERTENSION: Primary | ICD-10-CM

## 2018-02-22 DIAGNOSIS — E55.9 VITAMIN D DEFICIENCY: ICD-10-CM

## 2018-02-22 DIAGNOSIS — E78.2 MIXED HYPERLIPIDEMIA: ICD-10-CM

## 2018-02-22 DIAGNOSIS — I48.0 PAROXYSMAL ATRIAL FIBRILLATION (HCC): ICD-10-CM

## 2018-02-22 DIAGNOSIS — G47.33 OSA (OBSTRUCTIVE SLEEP APNEA): ICD-10-CM

## 2018-02-22 DIAGNOSIS — R73.01 IMPAIRED FASTING GLUCOSE: ICD-10-CM

## 2018-02-22 DIAGNOSIS — Z78.0 POST-MENOPAUSAL: ICD-10-CM

## 2018-02-22 PROBLEM — H35.30 MACULAR DEGENERATION: Status: ACTIVE | Noted: 2018-02-22

## 2018-02-22 PROBLEM — H40.9 GLAUCOMA: Status: ACTIVE | Noted: 2018-02-22

## 2018-02-22 PROCEDURE — 99214 OFFICE O/P EST MOD 30 MIN: CPT | Performed by: PHYSICIAN ASSISTANT

## 2018-02-22 RX ORDER — OMEPRAZOLE 40 MG/1
40 CAPSULE, DELAYED RELEASE ORAL DAILY
Qty: 90 CAPSULE | Refills: 3 | Status: SHIPPED | OUTPATIENT
Start: 2018-02-22 | End: 2018-09-18 | Stop reason: SDUPTHER

## 2018-02-22 RX ORDER — LOSARTAN POTASSIUM 25 MG/1
25 TABLET ORAL DAILY
Qty: 90 TABLET | Refills: 1 | Status: SHIPPED | OUTPATIENT
Start: 2018-02-22 | End: 2018-08-29 | Stop reason: SDUPTHER

## 2018-02-22 RX ORDER — LEVOTHYROXINE SODIUM 0.05 MG/1
50 TABLET ORAL DAILY
Qty: 90 TABLET | Refills: 3 | Status: SHIPPED | OUTPATIENT
Start: 2018-02-22 | End: 2019-03-24 | Stop reason: SDUPTHER

## 2018-02-22 RX ORDER — METOPROLOL SUCCINATE 25 MG/1
TABLET, EXTENDED RELEASE ORAL
Qty: 90 TABLET | Refills: 1 | Status: SHIPPED | OUTPATIENT
Start: 2018-02-22 | End: 2019-05-02 | Stop reason: SDUPTHER

## 2018-02-22 RX ORDER — EZETIMIBE 10 MG/1
10 TABLET ORAL DAILY
Qty: 90 TABLET | Refills: 3 | Status: SHIPPED | OUTPATIENT
Start: 2018-02-22 | End: 2019-03-24 | Stop reason: SDUPTHER

## 2018-02-22 NOTE — PROGRESS NOTES
Subjective   Jessica Ames is a 72 y.o. female.     History of Present Illness   Jessica Ames 72 y.o. female who presents today for routine follow up check and medication refills.  she has a history of   Patient Active Problem List   Diagnosis   • Hypertension   • Hyperlipidemia   • ERIC (obstructive sleep apnea)   • Impaired fasting glucose   • First degree AV block   • Left anterior fascicular block   • Obesity   • Paroxysmal atrial fibrillation   • PVC's (premature ventricular contractions)   • GERD (gastroesophageal reflux disease)   • Macular degeneration   • Glaucoma   .  Since the last visit, she has overall felt well.  She has Hypertenision and is well controlled on medication, Impaired fasting glucose and will continue close lab follow up to watch for DMII, Hyperlipidemia and is well controlled on medication, Atrial Fibrillation and remains under the care of their cardiologist for medical management, Hypothyroidism and is well controlled on Rx.  Labs are in desired treatment range and Vitamin D deficiency and will update labs to confirm level is at goal >30.  she has been compliant with current medications have reviewed them.  The patient denies medication side effects.    Results for orders placed or performed in visit on 02/15/18   Comprehensive metabolic panel   Result Value Ref Range    Glucose 121 (H) 65 - 99 mg/dL    BUN 13 8 - 27 mg/dL    Creatinine 0.84 0.57 - 1.00 mg/dL    eGFR Non African Am 70 >59 mL/min/1.73    eGFR African Am 80 >59 mL/min/1.73    BUN/Creatinine Ratio 15 12 - 28    Sodium 142 134 - 144 mmol/L    Potassium 4.1 3.5 - 5.2 mmol/L    Chloride 104 96 - 106 mmol/L    Total CO2 23 18 - 29 mmol/L    Calcium 9.3 8.7 - 10.3 mg/dL    Total Protein 6.6 6.0 - 8.5 g/dL    Albumin 4.2 3.5 - 4.8 g/dL    Globulin 2.4 1.5 - 4.5 g/dL    A/G Ratio 1.8 1.2 - 2.2    Total Bilirubin 0.5 0.0 - 1.2 mg/dL    Alkaline Phosphatase 95 39 - 117 IU/L    AST (SGOT) 19 0 - 40 IU/L    ALT (SGPT) 21 0 - 32 IU/L    Lipid panel   Result Value Ref Range    Total Cholesterol 195 100 - 199 mg/dL    Triglycerides 264 (H) 0 - 149 mg/dL    HDL Cholesterol 39 (L) >39 mg/dL    VLDL Cholesterol 53 (H) 5 - 40 mg/dL    LDL Cholesterol  103 (H) 0 - 99 mg/dL   TSH   Result Value Ref Range    TSH 3.060 0.450 - 4.500 uIU/mL   T4, Free   Result Value Ref Range    Free T4 1.33 0.82 - 1.77 ng/dL   Hemoglobin A1c   Result Value Ref Range    Hemoglobin A1C 6.0 (H) 4.8 - 5.6 %   Vitamin D 25 Hydroxy   Result Value Ref Range    25 Hydroxy, Vitamin D 16.7 (L) 30.0 - 100.0 ng/mL   CBC and Differential   Result Value Ref Range    WBC 5.6 3.4 - 10.8 x10E3/uL    RBC 4.77 3.77 - 5.28 x10E6/uL    Hemoglobin 13.7 11.1 - 15.9 g/dL    Hematocrit 40.9 34.0 - 46.6 %    MCV 86 79 - 97 fL    MCH 28.7 26.6 - 33.0 pg    MCHC 33.5 31.5 - 35.7 g/dL    RDW 14.8 12.3 - 15.4 %    Platelets 153 150 - 379 x10E3/uL    Neutrophil Rel % 52 Not Estab. %    Lymphocyte Rel % 39 Not Estab. %    Monocyte Rel % 9 Not Estab. %    Eosinophil Rel % 0 Not Estab. %    Basophil Rel % 0 Not Estab. %    Neutrophils Absolute 2.9 1.4 - 7.0 x10E3/uL    Lymphocytes Absolute 2.2 0.7 - 3.1 x10E3/uL    Monocytes Absolute 0.5 0.1 - 0.9 x10E3/uL    Eosinophils Absolute 0.0 0.0 - 0.4 x10E3/uL    Basophils Absolute 0.0 0.0 - 0.2 x10E3/uL    Immature Granulocyte Rel % 0 Not Estab. 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 Bowman  I want her to see cardio for a fib---she is on ASA  Use Cpap/Bipap every night    Est   Having edema in hands and feet; worse last few weeks---will consider lasix      Cardio add Losartan for BP  The following portions of the patient's history were reviewed and updated as appropriate: allergies, current medications, past family history, past medical history, past social history, past surgical history and problem list.    Review of Systems   Constitutional: Negative for activity  change, appetite change and unexpected weight change.   HENT: Positive for postnasal drip and tinnitus. Negative for nosebleeds and trouble swallowing.    Eyes: Negative for pain and visual disturbance.   Respiratory: Negative for chest tightness, shortness of breath and wheezing.    Cardiovascular: Negative for chest pain and palpitations.   Gastrointestinal: Positive for abdominal pain (muscle spasm). Negative for blood in stool.   Endocrine: Negative.    Genitourinary: Positive for enuresis. Negative for difficulty urinating and hematuria.   Musculoskeletal: Negative for joint swelling.   Skin: Negative for color change and rash.   Allergic/Immunologic: Negative.    Neurological: Positive for tremors. Negative for syncope and speech difficulty.   Hematological: Negative for adenopathy.   Psychiatric/Behavioral: Negative for agitation and confusion.   All other systems reviewed and are negative.      Objective   Physical Exam   Constitutional: She is oriented to person, place, and time. She appears well-developed and well-nourished. No distress.   HENT:   Head: Normocephalic and atraumatic.   Eyes: Conjunctivae and EOM are normal. Pupils are equal, round, and reactive to light. Right eye exhibits no discharge. Left eye exhibits no discharge. No scleral icterus.   Neck: Normal range of motion. Neck supple. No tracheal deviation present. No thyromegaly present.   Cardiovascular: Normal rate, regular rhythm, normal heart sounds, intact distal pulses and normal pulses.  Exam reveals no gallop.    No murmur heard.  Almost 1+ bilat   Pulmonary/Chest: Effort normal and breath sounds normal. No respiratory distress. She has no wheezes. She has no rales.   Musculoskeletal: Normal range of motion. She exhibits edema.   Neurological: She is alert and oriented to person, place, and time. She exhibits normal muscle tone. Coordination normal.   Skin: Skin is warm. No rash noted. No erythema. No pallor.   Psychiatric: She has a  normal mood and affect. Her behavior is normal. Judgment and thought content normal.   Nursing note and vitals reviewed.      Assessment/Plan   Jessica was seen today for hypertension.    Diagnoses and all orders for this visit:    Essential hypertension  -     Comprehensive metabolic panel; Future  -     Lipid panel; Future  -     Hemoglobin A1c; Future    Mixed hyperlipidemia  -     Comprehensive metabolic panel; Future  -     Lipid panel; Future  -     Hemoglobin A1c; Future    Gastroesophageal reflux disease without esophagitis  -     Comprehensive metabolic panel; Future  -     Lipid panel; Future  -     Hemoglobin A1c; Future    ERIC (obstructive sleep apnea)  -     Comprehensive metabolic panel; Future  -     Lipid panel; Future  -     Hemoglobin A1c; Future    Paroxysmal atrial fibrillation  -     Comprehensive metabolic panel; Future  -     Lipid panel; Future  -     Hemoglobin A1c; Future    Impaired fasting glucose  -     Comprehensive metabolic panel; Future  -     Lipid panel; Future  -     Hemoglobin A1c; Future    Vitamin D deficiency  -     Comprehensive metabolic panel; Future  -     Lipid panel; Future  -     Hemoglobin A1c; Future    Post-menopausal  -     DEXA Bone Density Axial; Future    Other orders  -     Cholecalciferol 2000 units capsule; Take 2,000 Units by mouth Daily. One PO daily  -     ezetimibe (ZETIA) 10 MG tablet; Take 1 tablet by mouth Daily. For cholesterol  -     levothyroxine (SYNTHROID, LEVOTHROID) 50 MCG tablet; Take 1 tablet by mouth Daily. For thyroid  -     losartan (COZAAR) 25 MG tablet; Take 1 tablet by mouth Daily. For BP  -     metoprolol succinate XL (TOPROL-XL) 25 MG 24 hr tablet; 1/2-1 PO daily for BP and heart rate control  -     omeprazole (priLOSEC) 40 MG capsule; Take 1 capsule by mouth Daily. For GERD

## 2018-02-22 NOTE — PATIENT INSTRUCTIONS
Low glycemic index diet  Exercise 30 minutes most days of the week  Make sure you get results on any labs or tests we ordered today  We discussed medications and how to take them as prescribed  Sleep 6-8 hours each night if possible  If you have not signed up for [employer], please activate your code ASAP from your After Visit Summary today    LDL goal <100  LDL goal if heart disease <70  HDL goal >60  Triglyceride goal <150  BP goal =<130/80  Fasting glucose <100

## 2018-02-26 ENCOUNTER — TELEPHONE (OUTPATIENT)
Dept: CARDIOLOGY | Facility: CLINIC | Age: 73
End: 2018-02-26

## 2018-02-26 NOTE — TELEPHONE ENCOUNTER
Pt is calling, states that she had an episode of afib last week & her PCP suggested that she call us because she is only on ASA. Thinks she should be on something more. Pt # [phone] or [phone]. aurora

## 2018-02-27 NOTE — TELEPHONE ENCOUNTER
Pt returned call & can't make the appointment I made for her next Weds, she will be out of town. Can I push her to the next week or have her see Jada? aurora

## 2018-03-02 ENCOUNTER — HOSPITAL ENCOUNTER (OUTPATIENT)
Dept: BONE DENSITY | Facility: HOSPITAL | Age: 73
Discharge: HOME | End: 2018-03-02
Admitting: PHYSICIAN ASSISTANT

## 2018-03-02 DIAGNOSIS — Z78.0 POST-MENOPAUSAL: ICD-10-CM

## 2018-03-02 PROCEDURE — 77080 DXA BONE DENSITY AXIAL: CPT

## 2018-03-14 ENCOUNTER — OFFICE VISIT (OUTPATIENT)
Dept: CARDIOLOGY | Facility: CLINIC | Age: 73
End: 2018-03-14

## 2018-03-14 VITALS
DIASTOLIC BLOOD PRESSURE: 90 MMHG | BODY MASS INDEX: 43.99 KG/M2 | SYSTOLIC BLOOD PRESSURE: 145 MMHG | HEIGHT: 65 IN | HEART RATE: 47 BPM | WEIGHT: 264 LBS

## 2018-03-14 DIAGNOSIS — I48.0 PAROXYSMAL ATRIAL FIBRILLATION (HCC): Primary | ICD-10-CM

## 2018-03-14 PROCEDURE — 99213 OFFICE O/P EST LOW 20 MIN: CPT | Performed by: INTERNAL MEDICINE

## 2018-03-14 PROCEDURE — 93000 ELECTROCARDIOGRAM COMPLETE: CPT | Performed by: INTERNAL MEDICINE

## 2018-03-14 NOTE — PROGRESS NOTES
Date of Office Visit: 2018  Encounter Provider: Anirudh Adame MD  Place of Service: [facility] CARDIOLOGY  Patient Name: Jessica Ames  :1945      Chief Complaint   Patient presents with   • Atrial Fibrillation     History of Present Illness  She and is a 72-year-old white female with a history of atrial fibrillation that is paroxysmal.  I last saw her almost 2 years ago.  She had an episode of atrial fibrillation recently that lasted for about 3 hours.  She took an extra aspirin and metoprolol and eventually she converted back to sinus rhythm.  This is the only episode that has occurred in over a year.  Symptomatically the patient states that she occasionally will notice palpitations and she also notices some exertional dyspnea.  She does have fatigue issues as well a some mild lower extremity swelling.  She has been eating and diet that's fairly high in salt mostly eating out in restaurants especially in the last few weeks.  Her weight has not significantly changed in the last 2 years however.    Past Medical History:   Diagnosis Date   • Disease of thyroid gland    • DVT (deep venous thrombosis)     right leg; s/p knee replacement; was on xarelto and now baby aspirin; followed by Dr. Parminder Mejia     • Factor V Leiden    • First degree AV block 2012   • GERD (gastroesophageal reflux disease) 2012   • Hiatal hernia    • Hyperlipidemia    • Hypertension    • IFG (impaired fasting glucose)    • Iron deficiency anemia     sees Dr. Clem Bowman    • Left anterior fascicular block 2012   • Lower extremity edema    • Obesity 2012   • ERIC (obstructive sleep apnea)     compliant with CPAP machine    • PAF (paroxysmal atrial fibrillation) 2015    day after colonoscopy went into atrial fibrillation; was on Xarelto and now baby aspirin    • Pulmonary embolism     also had DVT    • PVC's (premature ventricular contractions) 2012   •  "Venous insufficiency     followed by Dr. Parminder Mejia          Past Surgical History:   Procedure Laterality Date   • BREAST SURGERY      reduction   • CATH LAB PROCEDURE     • HAND SURGERY     • HYSTERECTOMY     • ROTATOR CUFF REPAIR     • TOTAL KNEE ARTHROPLASTY             Current Outpatient Prescriptions:   •  aspirin 81 MG EC tablet, Take 81 mg by mouth Daily., Disp: , Rfl:   •  bimatoprost (LUMIGAN) 0.01 % ophthalmic drops, Apply  to eye., Disp: , Rfl:   •  Cholecalciferol 2000 units capsule, Take 2,000 Units by mouth Daily. One PO daily, Disp: 90 each, Rfl: 3  •  ezetimibe (ZETIA) 10 MG tablet, Take 1 tablet by mouth Daily. For cholesterol, Disp: 90 tablet, Rfl: 3  •  levothyroxine (SYNTHROID, LEVOTHROID) 50 MCG tablet, Take 1 tablet by mouth Daily. For thyroid, Disp: 90 tablet, Rfl: 3  •  losartan (COZAAR) 25 MG tablet, Take 1 tablet by mouth Daily. For BP, Disp: 90 tablet, Rfl: 1  •  metoprolol succinate XL (TOPROL-XL) 25 MG 24 hr tablet, 1/2-1 PO daily for BP and heart rate control, Disp: 90 tablet, Rfl: 1  •  Multiple Vitamins-Minerals (OCUVITE EXTRA) tablet, Take  by mouth Daily., Disp: , Rfl:   •  omeprazole (priLOSEC) 40 MG capsule, Take 1 capsule by mouth Daily. For GERD, Disp: 90 capsule, Rfl: 3      Social History     Social History   • Marital status:      Spouse name: N/A   • Number of children: N/A   • Years of education: N/A     Occupational History   • Not on file.     Social History Main Topics   • Smoking status: Never Smoker   • Smokeless tobacco: Never Used   • Alcohol use Yes      Comment: \" once month\"   • Drug use: No   • Sexual activity: Defer     Other Topics Concern   • Not on file     Social History Narrative   • No narrative on file         Review of Systems   Constitution: Positive for malaise/fatigue.   HENT: Negative.    Eyes: Negative.    Cardiovascular: Positive for dyspnea on exertion and leg swelling.   Respiratory: Negative.    Endocrine: Negative.    Skin: Negative.  " "  Musculoskeletal: Negative.    Gastrointestinal: Negative.    Neurological: Negative.    Psychiatric/Behavioral: Negative.        Procedures      ECG 12 Lead  Date/Time: 3/14/2018 3:05 PM  Performed by: EFREN CORNELL  Authorized by: EFREN CORNELL   Comparison: compared with previous ECG from 8/23/2017  Similar to previous ECG  Rhythm: sinus rhythm  Rate: normal  Conduction: non-specific intraventricular conduction delay  QRS axis: normal                  Objective:    /90   Pulse (!) 47   Ht 165.1 cm (65\")   Wt 120 kg (264 lb)   BMI 43.93 kg/m²         Physical Exam   Constitutional: She is oriented to person, place, and time. She appears well-developed and well-nourished.   Overweight   HENT:   Head: Normocephalic.   Eyes: Pupils are equal, round, and reactive to light.   Neck: Normal range of motion. No JVD present. Carotid bruit is not present. No thyromegaly present.   Cardiovascular: Normal rate, regular rhythm, S1 normal, S2 normal, normal heart sounds and intact distal pulses.  Exam reveals no gallop and no friction rub.    No murmur heard.  Pulmonary/Chest: Effort normal and breath sounds normal.   Abdominal: Soft. Bowel sounds are normal.   Musculoskeletal: She exhibits no edema.   Neurological: She is alert and oriented to person, place, and time.   Skin: Skin is warm, dry and intact. No erythema.   Psychiatric: She has a normal mood and affect.   Vitals reviewed.          Assessment:       Diagnosis Plan   1. Paroxysmal atrial fibrillation       Atrial Fibrillation and Atrial Flutter  Assessment  • The patient has paroxysmal atrial fibrillation  • The patient's CHADS2-VASc score is 3  • A [id number] score of 2 or more is considered a high risk for a thromboembolic event  • Aspirin prescribed    Plan  • Attempt to maintain sinus rhythm  • Continue aspirin for antithrombotic therapy, bleeding issues discussed  • Continue beta blocker for rhythm control    Since the patient has only " had one episode in over a year I don't feel compelled to change her medication at this time.  I don't believe that she needs an anticoagulant such as Xarelto or Eliquis at this point.  If she starts to have increased episodes we will alter our therapy.    With respect to her edema I think this is diet induced.  I've suggested she stay away from salt foods at this time.     Plan:

## 2018-03-19 ENCOUNTER — OFFICE VISIT (OUTPATIENT)
Dept: SLEEP MEDICINE | Facility: HOSPITAL | Age: 73
End: 2018-03-19
Attending: INTERNAL MEDICINE

## 2018-03-19 VITALS
HEART RATE: 59 BPM | SYSTOLIC BLOOD PRESSURE: 147 MMHG | BODY MASS INDEX: 43.99 KG/M2 | HEIGHT: 65 IN | DIASTOLIC BLOOD PRESSURE: 70 MMHG | WEIGHT: 264 LBS

## 2018-03-19 DIAGNOSIS — Z99.89 OSA ON CPAP: Primary | ICD-10-CM

## 2018-03-19 DIAGNOSIS — G47.33 OSA ON CPAP: Primary | ICD-10-CM

## 2018-03-19 PROCEDURE — G0463 HOSPITAL OUTPT CLINIC VISIT: HCPCS

## 2018-03-19 NOTE — PROGRESS NOTES
"      [facility]         Dr Jazmin Murdock  [unfilled]  Patient Care Team:  Hortencia Jackman PA-C as PCP - General  Hortencia Jackman PA-C as PCP - Family Medicine  Clem Bowman MD as Consulting Physician (Hematology and Oncology)  Anirudh Adame MD as Consulting Physician (Cardiology)  Luke Hwang MD as Consulting Physician (Otolaryngology)  Yoseph Mars MD as Consulting Physician (Pulmonary Disease)  Fawad Rowland MD as Consulting Physician (Ophthalmology)  Jr Matthews MD as Consulting Physician (Hand Surgery)  Donavon Osuna MD as Consulting Physician (Gastroenterology)  Jazmin Murdock MD as Consulting Physician (Pulmonary Disease)    Chief Complaint: ERIC comorbidities of GERD A. fib hypertension hypothyroidism    Interval History: Patient here for compliance visit.  Reports machine does not work all the time.  Request new CPAP.  Currently on auto CPAP 4-10 cm.  Compliance is 97% with AHI 2.8 events per hour.  Leak is acceptable.  Patient feels benefit from CPAP.  Former DX to go patient.  Goes to bed 11 PM gets up 5:45 AM.  Feels rested in the morning.  No parasomnias no restless leg symptoms reported.    REVIEW OF SYSTEMS:   CARDIOVASCULAR: No chest pain, chest pressure or chest discomfort. No palpitations or edema.   RESPIRATORY: No shortness of breath, cough or sputum.   GASTROINTESTINAL: No anorexia, nausea, vomiting or diarrhea. No abdominal pain or blood.   HEMATOLOGIC: No bleeding or bruising.  Slight positive for painful joints swollen joints irregular heartbeat swollen ankles.  Rest of the 12 point review of system negative    Ventilator/Non-Invasive Ventilation Settings     None            Vital Signs  Heart Rate:  [59] 59  BP: (147)/(70) 147/70  [unfilled]  Flowsheet Rows    Flowsheet Row First Filed Value   Admission Height 165.1 cm (65\") Documented at 03/19/2018 1300   Admission Weight 120 kg (264 lb) Documented at 03/19/2018 1300          Physical Exam:   General Appearance:  "   Alert, cooperative, in no acute distress  ENT Mallampati between 3 and 4    Lungs:     Clear to auscultation,respirations regular, even and                  unlabored    Heart:    Regular rhythm and normal rate, normal S1 and S2, no            murmur, no gallop, no rub, no click   Chest Wall:    No abnormalities observed   Abdomen:     Normal bowel sounds, no masses, no organomegaly, soft        non-tender, non-distended, no guarding, no rebound                tenderness   Extremities:   Moves all extremities well, no edema, no cyanosis, no             redness     Results Review:                                          I reviewed the patient's new clinical results.  I personally viewed and interpreted the patient's CXR        Medication Review:         No current facility-administered medications for this visit.     ASSESSMENT:   ERIC with comorbidities of hypothyroidism GERD A. fib hypertension    PLAN:  Reviewed download with the patient  Patient request new auto CPAP  Will repair or replace auto CPAP 4-10 cm  Sleep hygiene measures  Weight loss encouraged  Follow-up in 6-8 weeks after new  for compliance download  Jazmin Murdock MD  03/19/18  1:56 PM

## 2018-05-07 ENCOUNTER — OFFICE VISIT (OUTPATIENT)
Dept: SLEEP MEDICINE | Facility: HOSPITAL | Age: 73
End: 2018-05-07
Attending: INTERNAL MEDICINE

## 2018-05-07 VITALS
WEIGHT: 263 LBS | HEIGHT: 65 IN | SYSTOLIC BLOOD PRESSURE: 140 MMHG | BODY MASS INDEX: 43.82 KG/M2 | DIASTOLIC BLOOD PRESSURE: 61 MMHG | HEART RATE: 58 BPM

## 2018-05-07 DIAGNOSIS — Z99.89 OSA ON CPAP: Primary | ICD-10-CM

## 2018-05-07 DIAGNOSIS — G47.33 OSA ON CPAP: Primary | ICD-10-CM

## 2018-05-07 PROCEDURE — G0463 HOSPITAL OUTPT CLINIC VISIT: HCPCS

## 2018-05-07 NOTE — PROGRESS NOTES
"[facility]         Dr Jazmin Murdock  [unfilled]  Patient Care Team:  Hortencia Jackman PA-C as PCP - General  Hortencia Jackman PA-C as PCP - Family Medicine  Clem Bowman MD as Consulting Physician (Hematology and Oncology)  Anirudh Adame MD as Consulting Physician (Cardiology)  Luke Hwang MD as Consulting Physician (Otolaryngology)  Yoseph Mars MD as Consulting Physician (Pulmonary Disease)  Fawad Rowland MD as Consulting Physician (Ophthalmology)  Jr Matthews MD as Consulting Physician (Hand Surgery)  Donavon Osuna MD as Consulting Physician (Gastroenterology)  Jazmin Murdock MD as Consulting Physician (Pulmonary Disease)    Chief Complaint: Follow-up new CPAP auto with A. fib hypertension and hypothyroidism    Interval History: Follow-up new CPAP.  Currently on auto CPAP 4-10 cm.  Compliance 100% average daily use 6 hours 41 minutes.  Leak and AHI are excellent.  Feels rested with the CPAP.  Benefiting from CPAP.  Likes her new CPAP machine.  Bedtime 11:30 PM awake times 6 AM.  No tobacco no alcohol or caffeine abuse.  Currently has nasal pillow.  Nasal benefits well.  Last mask change 3 months ago.    REVIEW OF SYSTEMS:   CARDIOVASCULAR: No chest pain, chest pressure or chest discomfort. No palpitations or edema.   RESPIRATORY: No shortness of breath, cough or sputum.   GASTROINTESTINAL: No anorexia, nausea, vomiting or diarrhea. No abdominal pain or blood.   HEMATOLOGIC: No bleeding or bruising.   Positive for postnasal drainage  Beeler was not completed but no residual sleepiness reported    Ventilator/Non-Invasive Ventilation Settings     None            Vital Signs  Heart Rate:  [58] 58  BP: (140)/(61) 140/61  [unfilled]  Flowsheet Rows    Flowsheet Row First Filed Value   Admission Height 165.1 cm (65\") Documented at 05/07/2018 1031   Admission Weight 119 kg (263 lb) Documented at 05/07/2018 1031          Physical Exam:   General Appearance:    Alert, cooperative, in no " acute distress  ENT Mallampati between 3 and 4    Lungs:     Clear to auscultation,respirations regular, even and                  unlabored    Heart:    Regular rhythm and normal rate, normal S1 and S2, no            murmur, no gallop, no rub, no click   Chest Wall:    No abnormalities observed   Abdomen:     Normal bowel sounds, no masses, no organomegaly, soft        non-tender, non-distended, no guarding, no rebound                tenderness   Extremities:   Moves all extremities well, no edema, no cyanosis, no             redness     Results Review:                                          I reviewed the patient's new clinical results.  I personally viewed and interpreted the patient's CXR        Medication Review:         No current facility-administered medications for this visit.     ASSESSMENT:   ERIC with hypothyroidism, A. fib, hypertension    PLAN:  Reviewed download with the patient  Compliance AHI and leak are acceptable  Reviewed auto CPAP settings.  Will adjust auto CPAP 6-12 cm.  Patient agreeable to the change  Weight loss encouraged  Sleep hygiene measures  Follow-up in 6 months  Jazmin Murdock MD  05/07/18  10:59 AM

## 2018-08-22 ENCOUNTER — RESULTS ENCOUNTER (OUTPATIENT)
Dept: FAMILY MEDICINE CLINIC | Facility: CLINIC | Age: 73
End: 2018-08-22

## 2018-08-22 DIAGNOSIS — R73.01 IMPAIRED FASTING GLUCOSE: ICD-10-CM

## 2018-08-22 DIAGNOSIS — E55.9 VITAMIN D DEFICIENCY: ICD-10-CM

## 2018-08-22 DIAGNOSIS — G47.33 OSA (OBSTRUCTIVE SLEEP APNEA): ICD-10-CM

## 2018-08-22 DIAGNOSIS — I48.0 PAROXYSMAL ATRIAL FIBRILLATION (HCC): ICD-10-CM

## 2018-08-22 DIAGNOSIS — E78.2 MIXED HYPERLIPIDEMIA: ICD-10-CM

## 2018-08-22 DIAGNOSIS — I10 ESSENTIAL HYPERTENSION: ICD-10-CM

## 2018-08-22 DIAGNOSIS — K21.9 GASTROESOPHAGEAL REFLUX DISEASE WITHOUT ESOPHAGITIS: ICD-10-CM

## 2018-08-24 LAB
ALBUMIN SERPL-MCNC: 4.2 G/DL (ref 3.5–5.2)
ALBUMIN/GLOB SERPL: 1.8 G/DL
ALP SERPL-CCNC: 82 U/L (ref 39–117)
ALT SERPL-CCNC: 19 U/L (ref 1–33)
AST SERPL-CCNC: 18 U/L (ref 1–32)
BILIRUB SERPL-MCNC: 0.6 MG/DL (ref 0.1–1.2)
BUN SERPL-MCNC: 17 MG/DL (ref 8–23)
BUN/CREAT SERPL: 18.7 (ref 7–25)
CALCIUM SERPL-MCNC: 9.5 MG/DL (ref 8.6–10.5)
CHLORIDE SERPL-SCNC: 105 MMOL/L (ref 98–107)
CHOLEST SERPL-MCNC: 182 MG/DL (ref 0–200)
CO2 SERPL-SCNC: 25.9 MMOL/L (ref 22–29)
CREAT SERPL-MCNC: 0.91 MG/DL (ref 0.57–1)
GLOBULIN SER CALC-MCNC: 2.4 GM/DL
GLUCOSE SERPL-MCNC: 119 MG/DL (ref 65–99)
HBA1C MFR BLD: 5.96 % (ref 4.8–5.6)
HDLC SERPL-MCNC: 38 MG/DL (ref 40–60)
LDLC SERPL CALC-MCNC: 105 MG/DL (ref 0–100)
POTASSIUM SERPL-SCNC: 4.3 MMOL/L (ref 3.5–5.2)
PROT SERPL-MCNC: 6.6 G/DL (ref 6–8.5)
SODIUM SERPL-SCNC: 144 MMOL/L (ref 136–145)
TRIGL SERPL-MCNC: 194 MG/DL (ref 0–150)
VLDLC SERPL CALC-MCNC: 38.8 MG/DL (ref 5–40)

## 2018-08-29 RX ORDER — LOSARTAN POTASSIUM 25 MG/1
25 TABLET ORAL DAILY
Qty: 30 TABLET | Refills: 0 | Status: SHIPPED | OUTPATIENT
Start: 2018-08-29 | End: 2018-09-18 | Stop reason: SDUPTHER

## 2018-09-18 ENCOUNTER — OFFICE VISIT (OUTPATIENT)
Dept: FAMILY MEDICINE CLINIC | Facility: CLINIC | Age: 73
End: 2018-09-18

## 2018-09-18 VITALS
OXYGEN SATURATION: 96 % | DIASTOLIC BLOOD PRESSURE: 68 MMHG | SYSTOLIC BLOOD PRESSURE: 120 MMHG | HEART RATE: 52 BPM | TEMPERATURE: 98 F | WEIGHT: 256 LBS | BODY MASS INDEX: 42.65 KG/M2 | RESPIRATION RATE: 16 BRPM | HEIGHT: 65 IN

## 2018-09-18 DIAGNOSIS — I87.2 VENOUS INSUFFICIENCY: ICD-10-CM

## 2018-09-18 DIAGNOSIS — E78.2 MIXED HYPERLIPIDEMIA: ICD-10-CM

## 2018-09-18 DIAGNOSIS — R10.31 RIGHT LOWER QUADRANT ABDOMINAL PAIN: ICD-10-CM

## 2018-09-18 DIAGNOSIS — R07.89 ATYPICAL CHEST PAIN: Primary | ICD-10-CM

## 2018-09-18 DIAGNOSIS — I48.0 PAROXYSMAL ATRIAL FIBRILLATION (HCC): ICD-10-CM

## 2018-09-18 DIAGNOSIS — R73.01 IMPAIRED FASTING GLUCOSE: ICD-10-CM

## 2018-09-18 DIAGNOSIS — I10 ESSENTIAL HYPERTENSION: ICD-10-CM

## 2018-09-18 DIAGNOSIS — G47.33 OSA (OBSTRUCTIVE SLEEP APNEA): ICD-10-CM

## 2018-09-18 PROBLEM — Z86.711 HISTORY OF PULMONARY EMBOLISM: Status: ACTIVE | Noted: 2018-09-18

## 2018-09-18 PROCEDURE — 99214 OFFICE O/P EST MOD 30 MIN: CPT | Performed by: PHYSICIAN ASSISTANT

## 2018-09-18 PROCEDURE — 71046 X-RAY EXAM CHEST 2 VIEWS: CPT | Performed by: PHYSICIAN ASSISTANT

## 2018-09-18 RX ORDER — LOSARTAN POTASSIUM 25 MG/1
25 TABLET ORAL DAILY
Qty: 90 TABLET | Refills: 1 | Status: SHIPPED | OUTPATIENT
Start: 2018-09-18 | End: 2019-03-24 | Stop reason: SDUPTHER

## 2018-09-18 RX ORDER — OMEPRAZOLE 40 MG/1
40 CAPSULE, DELAYED RELEASE ORAL 2 TIMES DAILY
Qty: 180 CAPSULE | Refills: 3 | Status: SHIPPED | OUTPATIENT
Start: 2018-09-18 | End: 2019-10-18 | Stop reason: SDUPTHER

## 2018-09-18 NOTE — PROGRESS NOTES
Subjective   Jsesica Ames is a 73 y.o. female.     History of Present Illness   Jessica Ames 73 y.o. female who presents today for routine follow up check and medication refills.  she has a history of   Patient Active Problem List   Diagnosis   • Hypertension   • Hyperlipidemia   • ERIC (obstructive sleep apnea)   • Impaired fasting glucose   • First degree AV block   • Left anterior fascicular block   • Obesity   • Paroxysmal atrial fibrillation (CMS/HCC)   • PVC's (premature ventricular contractions)   • GERD (gastroesophageal reflux disease)   • Macular degeneration   • Glaucoma   • Venous insufficiency   • History of pulmonary embolism   .  Since the last visit, she has overall felt well.  She has Hypertenision and is well controlled on medication, Impaired fasting glucose and will continue close lab follow up to watch for DMII, Hyperlipidemia and here to discuss treatment, Hypothyroidism and is well controlled on Rx.  Labs are in desired treatment range and Vitamin D deficiency and well controlled on medication and labs at goal >30.  she has been compliant with current medications have reviewed them.  The patient denies medication side effects.  Labs in Feb:  CMP, A1C, thyroid, D, B12, folate, CBC  Results for orders placed or performed in visit on 08/22/18   Comprehensive metabolic panel   Result Value Ref Range    Glucose 119 (H) 65 - 99 mg/dL    BUN 17 8 - 23 mg/dL    Creatinine 0.91 0.57 - 1.00 mg/dL    eGFR Non African Am 61 >60 mL/min/1.73    eGFR African Am 73 >60 mL/min/1.73    BUN/Creatinine Ratio 18.7 7.0 - 25.0    Sodium 144 136 - 145 mmol/L    Potassium 4.3 3.5 - 5.2 mmol/L    Chloride 105 98 - 107 mmol/L    Total CO2 25.9 22.0 - 29.0 mmol/L    Calcium 9.5 8.6 - 10.5 mg/dL    Total Protein 6.6 6.0 - 8.5 g/dL    Albumin 4.20 3.50 - 5.20 g/dL    Globulin 2.4 gm/dL    A/G Ratio 1.8 g/dL    Total Bilirubin 0.6 0.1 - 1.2 mg/dL    Alkaline Phosphatase 82 39 - 117 U/L    AST (SGOT) 18 1 - 32 U/L    ALT  (SGPT) 19 1 - 33 U/L   Lipid panel   Result Value Ref Range    Total Cholesterol 182 0 - 200 mg/dL    Triglycerides 194 (H) 0 - 150 mg/dL    HDL Cholesterol 38 (L) 40 - 60 mg/dL    VLDL Cholesterol 38.8 5 - 40 mg/dL    LDL Cholesterol  105 (H) 0 - 100 mg/dL   Hemoglobin A1c   Result Value Ref Range    Hemoglobin A1C 5.96 (H) 4.80 - 5.60 %       Factor V and hx PE  Anemia from malabsorption and has been seeing Dr. Bowman  I want her to see cardio for a fib---she is on ASA  Use Cpap/Bipap every night     Jessica Ames 73 y.o. female complains of chest pain. Onset was 1 week ago. Symptoms have been stable since that time. The patient's pain occurs intermittently and more after meals;  has hx GERD.  I will incease PPI to BID dosing and see DR Osuna;  Also want her to see cardio.. The patient describes the pain as soreness and does not radiate.  Not new occas pressure to jaw.  Patient rates pain as a 2/10 in intensity.  Associated symptoms are: chest pressure/discomfort.   Aggravating factors are: eating?. Alleviating factors are: none. She denies hemoptysis, dysphagia and occas pain right lower and lower abd intermittent for years and worse; see GI for this as well.  Patient's cardiac risk factors are: advanced age (older than 55 for men, 65 for women), dyslipidemia, family history of premature cardiovascular disease, hypertension, obesity (BMI >= 30 kg/m2) and sedentary lifestyle. Patient's risk factors for DVT/PE: history of deep venous thrombosis, history of pulmonary embolus and varicose veins. Previous cardiac testing: had been to A.  I will get CXR today, refer cardio and GI    Just saw vascular and released.  X-Ray  Interpretation report in house X-rays that I personally viewed    Relevant Clinical Issues/Diagnoses/Indications:  Chest pressure        Clinical Findings:  No mass, no infiltrate; heart size same          Comparative Data:  yes        Date of Previous X-ray:  11-25-15  Change on current X-ray    no  The following portions of the patient's history were reviewed and updated as appropriate: allergies, current medications, past family history, past medical history, past social history, past surgical history and problem list.    Review of Systems   Constitutional: Positive for fatigue. Negative for activity change, appetite change and unexpected weight change.   HENT: Positive for tinnitus. Negative for nosebleeds and trouble swallowing.    Eyes: Positive for photophobia. Negative for pain and visual disturbance.   Respiratory: Positive for apnea and shortness of breath. Negative for chest tightness and wheezing.    Cardiovascular: Negative for chest pain and palpitations.   Gastrointestinal: Positive for abdominal pain. Negative for blood in stool.   Endocrine: Negative.    Genitourinary: Negative for difficulty urinating and hematuria.   Musculoskeletal: Negative for joint swelling.   Skin: Negative for color change and rash.   Allergic/Immunologic: Negative.    Neurological: Positive for dizziness and light-headedness. Negative for syncope and speech difficulty.   Hematological: Negative for adenopathy. Bruises/bleeds easily.   Psychiatric/Behavioral: Negative for agitation and confusion.   All other systems reviewed and are negative.      Objective   Physical Exam   Constitutional: She is oriented to person, place, and time. She appears well-developed and well-nourished. No distress.   HENT:   Head: Normocephalic and atraumatic.   Eyes: Pupils are equal, round, and reactive to light. Conjunctivae and EOM are normal. Right eye exhibits no discharge. Left eye exhibits no discharge. No scleral icterus.   Neck: Normal range of motion. Neck supple. No tracheal deviation present. No thyromegaly present.   Cardiovascular: Normal rate, regular rhythm, normal heart sounds, intact distal pulses and normal pulses.  Exam reveals no gallop.    No murmur heard.  Trace pedal edema = bilat   Pulmonary/Chest: Effort normal  and breath sounds normal. No respiratory distress. She has no wheezes. She has no rales.   Abdominal: Soft. Bowel sounds are normal. She exhibits no mass. There is tenderness (epigastric and RUQ).   Musculoskeletal: Normal range of motion.   Neurological: She is alert and oriented to person, place, and time. She exhibits normal muscle tone. Coordination normal.   Skin: Skin is warm. No rash noted. No erythema. No pallor.   Psychiatric: She has a normal mood and affect. Her behavior is normal. Judgment and thought content normal.   Nursing note and vitals reviewed.      Assessment/Plan   Jessica was seen today for hypertension.    Diagnoses and all orders for this visit:    Atypical chest pain  -     Comprehensive metabolic panel  -     Lipid panel  -     CBC and Differential  -     TSH  -     T4, Free  -     T3  -     Vitamin B12  -     Folate  -     Vitamin D 25 Hydroxy  -     Hemoglobin A1c  -     Ambulatory Referral to Gastroenterology  -     Ambulatory Referral to Cardiology  -     XR Chest PA & Lateral    Right lower quadrant abdominal pain  -     Comprehensive metabolic panel  -     Lipid panel  -     CBC and Differential  -     TSH  -     T4, Free  -     T3  -     Vitamin B12  -     Folate  -     Vitamin D 25 Hydroxy  -     Hemoglobin A1c  -     Ambulatory Referral to Gastroenterology  -     Ambulatory Referral to Cardiology    Paroxysmal atrial fibrillation (CMS/HCC)  -     Comprehensive metabolic panel  -     Lipid panel  -     CBC and Differential  -     TSH  -     T4, Free  -     T3  -     Vitamin B12  -     Folate  -     Vitamin D 25 Hydroxy  -     Hemoglobin A1c  -     Ambulatory Referral to Gastroenterology  -     Ambulatory Referral to Cardiology    Impaired fasting glucose  -     Comprehensive metabolic panel  -     Lipid panel  -     CBC and Differential  -     TSH  -     T4, Free  -     T3  -     Vitamin B12  -     Folate  -     Vitamin D 25 Hydroxy  -     Hemoglobin A1c  -     Ambulatory Referral to  Gastroenterology  -     Ambulatory Referral to Cardiology    ERIC (obstructive sleep apnea)  -     Comprehensive metabolic panel  -     Lipid panel  -     CBC and Differential  -     TSH  -     T4, Free  -     T3  -     Vitamin B12  -     Folate  -     Vitamin D 25 Hydroxy  -     Hemoglobin A1c  -     Ambulatory Referral to Gastroenterology  -     Ambulatory Referral to Cardiology    Mixed hyperlipidemia  -     Comprehensive metabolic panel  -     Lipid panel  -     CBC and Differential  -     TSH  -     T4, Free  -     T3  -     Vitamin B12  -     Folate  -     Vitamin D 25 Hydroxy  -     Hemoglobin A1c  -     Ambulatory Referral to Gastroenterology  -     Ambulatory Referral to Cardiology    Essential hypertension  -     Comprehensive metabolic panel  -     Lipid panel  -     CBC and Differential  -     TSH  -     T4, Free  -     T3  -     Vitamin B12  -     Folate  -     Vitamin D 25 Hydroxy  -     Hemoglobin A1c  -     Ambulatory Referral to Gastroenterology  -     Ambulatory Referral to Cardiology    Venous insufficiency  -     Comprehensive metabolic panel  -     Lipid panel  -     CBC and Differential  -     TSH  -     T4, Free  -     T3  -     Vitamin B12  -     Folate  -     Vitamin D 25 Hydroxy  -     Hemoglobin A1c  -     Ambulatory Referral to Gastroenterology  -     Ambulatory Referral to Cardiology    Other orders  -     losartan (COZAAR) 25 MG tablet; Take 1 tablet by mouth Daily. For BP  -     omeprazole (priLOSEC) 40 MG capsule; Take 1 capsule by mouth 2 (Two) Times a Day. For GERD

## 2018-09-18 NOTE — PATIENT INSTRUCTIONS
ER if worse  Low glycemic index diet  Exercise 30 minutes most days of the week  Make sure you get results on any labs or tests we ordered today  We discussed medications and how to take them as prescribed  Sleep 6-8 hours each night if possible  If you have not signed up for [employer], please activate your code ASAP from your After Visit Summary today    LDL goal <100  LDL goal if heart disease <70  HDL goal >60  Triglyceride goal <150  BP goal =<130/80  Fasting glucose <100

## 2018-09-25 ENCOUNTER — OFFICE VISIT (OUTPATIENT)
Dept: CARDIOLOGY | Facility: CLINIC | Age: 73
End: 2018-09-25

## 2018-09-25 VITALS
HEIGHT: 65 IN | WEIGHT: 255.2 LBS | OXYGEN SATURATION: 97 % | SYSTOLIC BLOOD PRESSURE: 138 MMHG | BODY MASS INDEX: 42.52 KG/M2 | HEART RATE: 55 BPM | DIASTOLIC BLOOD PRESSURE: 90 MMHG

## 2018-09-25 DIAGNOSIS — I48.0 PAROXYSMAL ATRIAL FIBRILLATION (HCC): ICD-10-CM

## 2018-09-25 DIAGNOSIS — R07.9 CHEST PAIN, UNSPECIFIED TYPE: Primary | ICD-10-CM

## 2018-09-25 DIAGNOSIS — I10 ESSENTIAL HYPERTENSION: ICD-10-CM

## 2018-09-25 DIAGNOSIS — E78.2 MIXED HYPERLIPIDEMIA: ICD-10-CM

## 2018-09-25 PROCEDURE — 93000 ELECTROCARDIOGRAM COMPLETE: CPT | Performed by: NURSE PRACTITIONER

## 2018-09-25 PROCEDURE — 99214 OFFICE O/P EST MOD 30 MIN: CPT | Performed by: NURSE PRACTITIONER

## 2018-09-25 NOTE — PROGRESS NOTES
"    Patient Name: Jessica Ames  :1945  Age: 73 y.o.  Primary Cardiologist: Anirudh Adame MD  Encounter Provider:  MIRA Hayes      Chief Complaint:   Chief Complaint   Patient presents with   • Chest Pain     per PCP          HPI  Patient is a 72-year-old white female with a history of paroxysmal atrial fibrillation.  Patient is new to me however I have reviewed her prior medical record.  Patient presents today for chest pain referred by her PCP.  Patient states that over the past 3-4 weeks she has experienced a \"soreness\" to her mid chest.  She reports that this pain is intermittent and lasts hours at a time.  She denies any exacerbating or alleviating symptoms.  Patient states that at times the pain will radiate to both the left and right side of the chest as well as to the jaw and back.  Patient does experience nausea, shortness of breath, fatigue with these episodes.  At the present time patient denies chest pain.  She reports that pain at the maximum is 2/10.  Patient also states that chest x-ray done at primary care physician's office showed cardiomegaly.  Patient does admit having a history of GERD, hiatal hernia, esophageal polyps.  She is followed by RYLEY Shah, and her last EGD and colonoscopy was 3 years ago where she does admit having esophageal polyps removed.  Patient states that she had a PET stress test 5 years ago at an outlying facility.  She reports that she had a cardiac catheterization in .  I was unable to review the exact cardiac catheterization but was able to find evidence that there was no obstructive disease to the coronary arteries and a note from Dr. Adame.      The following portions of the patient's history were reviewed and updated as appropriate: allergies, current medications, past family history, past medical history, past social history, past surgical history and problem list.    Current Outpatient Prescriptions on File Prior to Visit " "  Medication Sig Dispense Refill   • aspirin 81 MG EC tablet Take 81 mg by mouth Daily.     • Cholecalciferol 2000 units capsule Take 2,000 Units by mouth Daily. One PO daily 90 each 3   • ezetimibe (ZETIA) 10 MG tablet Take 1 tablet by mouth Daily. For cholesterol 90 tablet 3   • levothyroxine (SYNTHROID, LEVOTHROID) 50 MCG tablet Take 1 tablet by mouth Daily. For thyroid 90 tablet 3   • losartan (COZAAR) 25 MG tablet Take 1 tablet by mouth Daily. For BP 90 tablet 1   • metoprolol succinate XL (TOPROL-XL) 25 MG 24 hr tablet 1/2-1 PO daily for BP and heart rate control 90 tablet 1   • omeprazole (priLOSEC) 40 MG capsule Take 1 capsule by mouth 2 (Two) Times a Day. For GERD 180 capsule 3   • [DISCONTINUED] bimatoprost (LUMIGAN) 0.01 % ophthalmic drops Apply  to eye.     • [DISCONTINUED] Multiple Vitamins-Minerals (OCUVITE EXTRA) tablet Take  by mouth Daily.       No current facility-administered medications on file prior to visit.          Review of Systems   Constitution: Positive for malaise/fatigue and weight loss (8 pounds, dieting ).   Cardiovascular: Positive for chest pain and leg swelling.   Respiratory: Positive for shortness of breath.    Musculoskeletal: Positive for joint pain.   Gastrointestinal: Positive for abdominal pain and nausea.   Psychiatric/Behavioral:        Anxiety     All other systems reviewed and are negative.      OBJECTIVE:   Vital Signs  There were no vitals filed for this visit.  Estimated body mass index is 42.6 kg/m² as calculated from the following:    Height as of 9/18/18: 165.1 cm (65\").    Weight as of 9/18/18: 116 kg (256 lb).    Physical Exam   Constitutional: She is oriented to person, place, and time. Vital signs are normal. She appears well-developed and well-nourished. She is active.   Obese female   Eyes: Conjunctivae are normal.   Cardiovascular: Normal rate, regular rhythm and normal heart sounds.    1+ bilateral pitting edema   Pulmonary/Chest: Breath sounds normal. " "  Abdominal: Normal appearance.   Neurological: She is alert and oriented to person, place, and time. GCS eye subscore is 4. GCS verbal subscore is 5. GCS motor subscore is 6.   Skin: Skin is warm, dry and intact.   Psychiatric: She has a normal mood and affect. Her speech is normal and behavior is normal. Judgment and thought content normal. Cognition and memory are normal.         ECG 12 Lead  Date/Time: 9/25/2018 9:41 AM  Performed by: KIT SOLORZANO  Authorized by: KIT SOLORZANO   Comparison: compared with previous ECG from 3/14/2018  Similar to previous ECG  Rhythm: sinus bradycardia  Rate: bradycardic  BPM: 45  Conduction: 1st degree  T flattening: V1-V6  QRS axis: left  Q waves: V2  Clinical impression: non-specific ECG          Cardiac Catherization:  2013:  No CAD.  (Unable to review actual report, this was referenced in Dr. Adame's office note from 12/15/2015)    Cardiac Echo:  October 19, 2015: EF 68%.  Mild to moderate left ventricular hypertrophy.  Diastolic dysfunction grade 2.  Left atrial dilatation.  Trace to mild mitral regurgitation.  Mild tricuspid regurgitation.  Estimated right ventricular systolic pressure is 28 mgHg which is normal.        ASSESSMENT:      Diagnosis Plan   1. Chest pain, unspecified type  Adult Stress Echo W/ Cont or Stress Agent if Necessary Per Protocol   2. Paroxysmal atrial fibrillation (CMS/HCC)     3. Mixed hyperlipidemia     4. Essential hypertension           PLAN OF CARE:     1.  Chest pain: Patient has had intermittent midsternal chest \"soreness\" that lasts for hours.  At times this pain radiates across the chest as well as to her jaw and back.  She experiences shortness of breath, nausea, fatigue with these episodes.  Patient also admits history of esophageal problems to include polyps and GERD.  It is difficult to differentiate if this pain could be from gastro-related disease or heart related disease.  Patient's last cardiac catheterization was 2013 and " was negative for coronary artery disease.  She last had a PET stress test 5 years ago.  The patient is currently not experiencing any pain in the office.  We'll proceed with nuclear stress test and echocardiogram.    2.  Paroxysmal atrial fibrillation: Patient denies any episodes of heart palpitations, heart skipping beats.  Patient in sinus bradycardia on EKG today.    3.  Hyperlipidemia: Patient tolerating current medication regimen without difficulties.  Patient has lipid panel ordered and will be followed by PCP.    4.  Hypertension: Patient's blood pressure has been controlled at home.  Diastolic is slightly elevated today.  We will continue to monitor.    5.  Follow-up to be determined after above-mentioned testing.    Atrial Fibrillation and Atrial Flutter  Assessment  • The patient has paroxysmal atrial fibrillation  • The patient's CHADS2-VASc score is 3  • A [id number] score of 2 or more is considered a high risk for a thromboembolic event  • Aspirin prescribed    Plan  • Attempt to maintain sinus rhythm  • Continue aspirin for antithrombotic therapy, bleeding issues discussed  • Continue beta blocker for rhythm control  • Continue beta blocker for rate control        Thank you for allowing me to participate in the care of your patient,      Sincerely,   MIRA Hayes  [facility]  09/25/18  8:31 AM

## 2018-09-26 ENCOUNTER — HOSPITAL ENCOUNTER (OUTPATIENT)
Dept: CARDIOLOGY | Facility: HOSPITAL | Age: 73
Discharge: HOME | End: 2018-09-26
Admitting: NURSE PRACTITIONER

## 2018-09-26 ENCOUNTER — TELEPHONE (OUTPATIENT)
Dept: CARDIOLOGY | Facility: CLINIC | Age: 73
End: 2018-09-26

## 2018-09-26 VITALS
WEIGHT: 254 LBS | HEIGHT: 65 IN | BODY MASS INDEX: 42.32 KG/M2 | HEART RATE: 60 BPM | SYSTOLIC BLOOD PRESSURE: 150 MMHG | DIASTOLIC BLOOD PRESSURE: 88 MMHG

## 2018-09-26 DIAGNOSIS — R07.9 CHEST PAIN, UNSPECIFIED TYPE: ICD-10-CM

## 2018-09-26 PROCEDURE — 0399T HC MYOCARDL STRAIN IMAG QUAN ASSMT PER SESS: CPT

## 2018-09-26 PROCEDURE — 0399T ADULT TRANSTHORACIC ECHO COMPLETE W/ CONT IF NECESSARY PER PROTOCOL: CPT | Performed by: INTERNAL MEDICINE

## 2018-09-26 PROCEDURE — 93306 TTE W/DOPPLER COMPLETE: CPT

## 2018-09-26 PROCEDURE — 93306 TTE W/DOPPLER COMPLETE: CPT | Performed by: INTERNAL MEDICINE

## 2018-09-26 NOTE — TELEPHONE ENCOUNTER
[phone]    Pt called stating she was seen yesterday and had her echo this morning.  She did state that they changed her Alexia to a PET and she wants to make sure this will give you the imaging you need.  Can you advise and I will call patient?    Thanks, [facility]

## 2018-09-26 NOTE — TELEPHONE ENCOUNTER
09/26/18  10:14 AM    Returns patient's call and informed her that the change in stress test to a PET stress was fine.  I did review the patient's echocardiogram results with her.  No changes based on echocardiogram.  I informed her that I would call her when I got the results from PET stress.    MIRA Pillai  [facility]

## 2018-09-27 LAB
BH CV ECHO MEAS - ACS: 2.3 CM
BH CV ECHO MEAS - AO MAX PG (FULL): 2.6 MMHG
BH CV ECHO MEAS - AO MAX PG: 6.4 MMHG
BH CV ECHO MEAS - AO MEAN PG (FULL): 1.3 MMHG
BH CV ECHO MEAS - AO MEAN PG: 3.2 MMHG
BH CV ECHO MEAS - AO ROOT AREA (BSA CORRECTED): 1.5
BH CV ECHO MEAS - AO ROOT AREA: 8.5 CM^2
BH CV ECHO MEAS - AO ROOT DIAM: 3.3 CM
BH CV ECHO MEAS - AO V2 MAX: 126.2 CM/SEC
BH CV ECHO MEAS - AO V2 MEAN: 82.5 CM/SEC
BH CV ECHO MEAS - AO V2 VTI: 30.5 CM
BH CV ECHO MEAS - AVA(I,A): 2.7 CM^2
BH CV ECHO MEAS - AVA(I,D): 2.7 CM^2
BH CV ECHO MEAS - AVA(V,A): 2.8 CM^2
BH CV ECHO MEAS - AVA(V,D): 2.8 CM^2
BH CV ECHO MEAS - BSA(HAYCOCK): 2.4 M^2
BH CV ECHO MEAS - BSA: 2.2 M^2
BH CV ECHO MEAS - BZI_BMI: 42.3 KILOGRAMS/M^2
BH CV ECHO MEAS - BZI_METRIC_HEIGHT: 165.1 CM
BH CV ECHO MEAS - BZI_METRIC_WEIGHT: 115.2 KG
BH CV ECHO MEAS - EDV(MOD-SP2): 78 ML
BH CV ECHO MEAS - EDV(MOD-SP4): 80 ML
BH CV ECHO MEAS - EDV(TEICH): 122.1 ML
BH CV ECHO MEAS - EF(CUBED): 69.3 %
BH CV ECHO MEAS - EF(MOD-BP): 63 %
BH CV ECHO MEAS - EF(MOD-SP2): 61.5 %
BH CV ECHO MEAS - EF(MOD-SP4): 65 %
BH CV ECHO MEAS - EF(TEICH): 60.6 %
BH CV ECHO MEAS - ESV(MOD-SP2): 30 ML
BH CV ECHO MEAS - ESV(MOD-SP4): 28 ML
BH CV ECHO MEAS - ESV(TEICH): 48.1 ML
BH CV ECHO MEAS - FS: 32.6 %
BH CV ECHO MEAS - IVS/LVPW: 1.5
BH CV ECHO MEAS - IVSD: 1.5 CM
BH CV ECHO MEAS - LAT PEAK E' VEL: 6 CM/SEC
BH CV ECHO MEAS - LV DIASTOLIC VOL/BSA (35-75): 36.5 ML/M^2
BH CV ECHO MEAS - LV MASS(C)D: 241.8 GRAMS
BH CV ECHO MEAS - LV MASS(C)DI: 110.4 GRAMS/M^2
BH CV ECHO MEAS - LV MAX PG: 3.8 MMHG
BH CV ECHO MEAS - LV MEAN PG: 1.9 MMHG
BH CV ECHO MEAS - LV SYSTOLIC VOL/BSA (12-30): 12.8 ML/M^2
BH CV ECHO MEAS - LV V1 MAX: 97 CM/SEC
BH CV ECHO MEAS - LV V1 MEAN: 63.8 CM/SEC
BH CV ECHO MEAS - LV V1 VTI: 22.2 CM
BH CV ECHO MEAS - LVIDD: 5.1 CM
BH CV ECHO MEAS - LVIDS: 3.4 CM
BH CV ECHO MEAS - LVLD AP2: 6.4 CM
BH CV ECHO MEAS - LVLD AP4: 6 CM
BH CV ECHO MEAS - LVLS AP2: 4.8 CM
BH CV ECHO MEAS - LVLS AP4: 4.6 CM
BH CV ECHO MEAS - LVOT AREA (M): 3.8 CM^2
BH CV ECHO MEAS - LVOT AREA: 3.6 CM^2
BH CV ECHO MEAS - LVOT DIAM: 2.2 CM
BH CV ECHO MEAS - LVPWD: 0.96 CM
BH CV ECHO MEAS - MED PEAK E' VEL: 5 CM/SEC
BH CV ECHO MEAS - MV A DUR: 0.14 SEC
BH CV ECHO MEAS - MV A MAX VEL: 98 CM/SEC
BH CV ECHO MEAS - MV DEC SLOPE: 310 CM/SEC^2
BH CV ECHO MEAS - MV DEC TIME: 0.22 SEC
BH CV ECHO MEAS - MV E MAX VEL: 67.4 CM/SEC
BH CV ECHO MEAS - MV E/A: 0.69
BH CV ECHO MEAS - MV MAX PG: 3.4 MMHG
BH CV ECHO MEAS - MV MEAN PG: 1.4 MMHG
BH CV ECHO MEAS - MV P1/2T MAX VEL: 68.9 CM/SEC
BH CV ECHO MEAS - MV P1/2T: 65.1 MSEC
BH CV ECHO MEAS - MV V2 MAX: 92.1 CM/SEC
BH CV ECHO MEAS - MV V2 MEAN: 54 CM/SEC
BH CV ECHO MEAS - MV V2 VTI: 33.6 CM
BH CV ECHO MEAS - MVA P1/2T LCG: 3.2 CM^2
BH CV ECHO MEAS - MVA(P1/2T): 3.4 CM^2
BH CV ECHO MEAS - MVA(VTI): 2.4 CM^2
BH CV ECHO MEAS - PA MAX PG (FULL): 2.7 MMHG
BH CV ECHO MEAS - PA MAX PG: 4.3 MMHG
BH CV ECHO MEAS - PA V2 MAX: 103.2 CM/SEC
BH CV ECHO MEAS - PULM A REVS DUR: 0.11 SEC
BH CV ECHO MEAS - PULM A REVS VEL: 31.9 CM/SEC
BH CV ECHO MEAS - PULM DIAS VEL: 51.9 CM/SEC
BH CV ECHO MEAS - PULM S/D: 1.5
BH CV ECHO MEAS - PULM SYS VEL: 75.7 CM/SEC
BH CV ECHO MEAS - PVA(V,A): 2 CM^2
BH CV ECHO MEAS - PVA(V,D): 2 CM^2
BH CV ECHO MEAS - QP/QS: 0.58
BH CV ECHO MEAS - RAP SYSTOLE: 3 MMHG
BH CV ECHO MEAS - RV MAX PG: 1.5 MMHG
BH CV ECHO MEAS - RV MEAN PG: 0.85 MMHG
BH CV ECHO MEAS - RV V1 MAX: 61.6 CM/SEC
BH CV ECHO MEAS - RV V1 MEAN: 43.5 CM/SEC
BH CV ECHO MEAS - RV V1 VTI: 14.4 CM
BH CV ECHO MEAS - RVOT AREA: 3.3 CM^2
BH CV ECHO MEAS - RVOT DIAM: 2 CM
BH CV ECHO MEAS - RVSP: 24 MMHG
BH CV ECHO MEAS - SI(AO): 119 ML/M^2
BH CV ECHO MEAS - SI(CUBED): 41.3 ML/M^2
BH CV ECHO MEAS - SI(LVOT): 37 ML/M^2
BH CV ECHO MEAS - SI(MOD-SP2): 21.9 ML/M^2
BH CV ECHO MEAS - SI(MOD-SP4): 23.7 ML/M^2
BH CV ECHO MEAS - SI(TEICH): 33.8 ML/M^2
BH CV ECHO MEAS - SUP REN AO DIAM: 1.6 CM
BH CV ECHO MEAS - SV(AO): 260.7 ML
BH CV ECHO MEAS - SV(CUBED): 90.4 ML
BH CV ECHO MEAS - SV(LVOT): 80.9 ML
BH CV ECHO MEAS - SV(MOD-SP2): 48 ML
BH CV ECHO MEAS - SV(MOD-SP4): 52 ML
BH CV ECHO MEAS - SV(RVOT): 47.3 ML
BH CV ECHO MEAS - SV(TEICH): 74.1 ML
BH CV ECHO MEAS - TAPSE (>1.6): 1.5 CM2
BH CV ECHO MEAS - TR MAX VEL: 229.4 CM/SEC
BH CV ECHO MEASUREMENTS AVERAGE E/E' RATIO: 12.25
BH CV XLRA - RV BASE: 3 CM
BH CV XLRA - TDI S': 12 CM/SEC
LEFT ATRIUM VOLUME INDEX: 39 ML/M2
LEFT ATRIUM VOLUME: 86 CM3
LV EF 2D ECHO EST: 63 %
MAXIMAL PREDICTED HEART RATE: 147 BPM
STRESS TARGET HR: 125 BPM

## 2018-09-28 ENCOUNTER — HOSPITAL ENCOUNTER (OUTPATIENT)
Dept: CARDIOLOGY | Facility: HOSPITAL | Age: 73
Discharge: HOME | End: 2018-09-28
Admitting: NURSE PRACTITIONER

## 2018-09-28 VITALS — BODY MASS INDEX: 43.36 KG/M2 | WEIGHT: 254 LBS | HEIGHT: 64 IN

## 2018-09-28 DIAGNOSIS — R07.9 CHEST PAIN, UNSPECIFIED TYPE: ICD-10-CM

## 2018-09-28 LAB
BH CV NUCLEAR PRIOR STUDY: 2
BH CV STRESS BP STAGE 1: NORMAL
BH CV STRESS COMMENTS STAGE 1: NORMAL
BH CV STRESS DOSE REGADENOSON STAGE 1: 0.4
BH CV STRESS DURATION MIN STAGE 1: 0
BH CV STRESS DURATION SEC STAGE 1: 10
BH CV STRESS HR STAGE 1: 88
BH CV STRESS PROTOCOL 1: NORMAL
BH CV STRESS RECOVERY BP: NORMAL MMHG
BH CV STRESS RECOVERY HR: 68 BPM
BH CV STRESS STAGE 1: 1
LV EF NUC BP: 73 %
MAXIMAL PREDICTED HEART RATE: 147 BPM
PERCENT MAX PREDICTED HR: 59.86 %
STRESS BASELINE BP: NORMAL MMHG
STRESS BASELINE HR: 47 BPM
STRESS PERCENT HR: 70 %
STRESS POST EXERCISE DUR SEC: 10 SEC
STRESS POST PEAK BP: NORMAL MMHG
STRESS POST PEAK HR: 88 BPM
STRESS TARGET HR: 125 BPM

## 2018-09-28 PROCEDURE — A9555 RB82 RUBIDIUM: HCPCS | Performed by: NURSE PRACTITIONER

## 2018-09-28 PROCEDURE — 25010000002 REGADENOSON 0.4 MG/5ML SOLUTION: Performed by: NURSE PRACTITIONER

## 2018-09-28 PROCEDURE — 78492 MYOCRD IMG PET MLT RST&STRS: CPT

## 2018-09-28 PROCEDURE — 93017 CV STRESS TEST TRACING ONLY: CPT

## 2018-09-28 PROCEDURE — 93016 CV STRESS TEST SUPVJ ONLY: CPT | Performed by: INTERNAL MEDICINE

## 2018-09-28 PROCEDURE — 78492 MYOCRD IMG PET MLT RST&STRS: CPT | Performed by: INTERNAL MEDICINE

## 2018-09-28 PROCEDURE — 93018 CV STRESS TEST I&R ONLY: CPT | Performed by: INTERNAL MEDICINE

## 2018-09-28 PROCEDURE — 0 RUBIDIUM CHLORIDE: Performed by: NURSE PRACTITIONER

## 2018-09-28 RX ADMIN — RUBIDIUM CHLORIDE RB-82 1 DOSE: 150 INJECTION, SOLUTION INTRAVENOUS at 08:10

## 2018-09-28 RX ADMIN — REGADENOSON 0.4 MG: 0.08 INJECTION, SOLUTION INTRAVENOUS at 08:20

## 2018-09-28 RX ADMIN — RUBIDIUM CHLORIDE RB-82 1 DOSE: 150 INJECTION, SOLUTION INTRAVENOUS at 08:20

## 2018-10-02 ENCOUNTER — TELEPHONE (OUTPATIENT)
Dept: CARDIOLOGY | Facility: CLINIC | Age: 73
End: 2018-10-02

## 2018-10-02 NOTE — TELEPHONE ENCOUNTER
10/02/18  11:53 AM    Patient was called and notified of results of that stress test.  Patient states that she still occasionally has intermittent chest tightness that is no more than 2/10.  It does not worsen with exertion.  She also denies shortness of breath, nausea, vomiting.  Patient does have a history of esophageal problems and was recommended to make an appointment with gastroenterology.  She was informed that if her chest pain worsens or gets worse with exertion, shortness of breath, nausea, vomiting, diaphoresis to proceed to the emergency department or to inform our office.  Patient verbalized understanding of above.    MIRA Pillai  [facility]

## 2018-10-15 ENCOUNTER — OFFICE (OUTPATIENT)
Dept: URBAN - METROPOLITAN AREA CLINIC 2 | Facility: CLINIC | Age: 73
End: 2018-10-15

## 2018-10-15 VITALS
HEIGHT: 65 IN | DIASTOLIC BLOOD PRESSURE: 80 MMHG | WEIGHT: 255 LBS | HEART RATE: 55 BPM | SYSTOLIC BLOOD PRESSURE: 130 MMHG

## 2018-10-15 DIAGNOSIS — D50.0 IRON DEFICIENCY ANEMIA SECONDARY TO BLOOD LOSS (CHRONIC): ICD-10-CM

## 2018-10-15 DIAGNOSIS — R11.0 NAUSEA: ICD-10-CM

## 2018-10-15 DIAGNOSIS — K29.70 GASTRITIS, UNSPECIFIED, WITHOUT BLEEDING: ICD-10-CM

## 2018-10-15 DIAGNOSIS — R10.13 EPIGASTRIC PAIN: ICD-10-CM

## 2018-10-15 DIAGNOSIS — R07.89 OTHER CHEST PAIN: ICD-10-CM

## 2018-10-15 PROCEDURE — 99214 OFFICE O/P EST MOD 30 MIN: CPT

## 2018-10-31 VITALS
SYSTOLIC BLOOD PRESSURE: 142 MMHG | SYSTOLIC BLOOD PRESSURE: 146 MMHG | OXYGEN SATURATION: 96 % | DIASTOLIC BLOOD PRESSURE: 70 MMHG | DIASTOLIC BLOOD PRESSURE: 51 MMHG | OXYGEN SATURATION: 95 % | SYSTOLIC BLOOD PRESSURE: 135 MMHG | HEART RATE: 68 BPM | HEART RATE: 64 BPM | DIASTOLIC BLOOD PRESSURE: 67 MMHG | OXYGEN SATURATION: 97 % | SYSTOLIC BLOOD PRESSURE: 150 MMHG | WEIGHT: 255 LBS | SYSTOLIC BLOOD PRESSURE: 133 MMHG | SYSTOLIC BLOOD PRESSURE: 128 MMHG | RESPIRATION RATE: 25 BRPM | TEMPERATURE: 97.7 F | RESPIRATION RATE: 20 BRPM | HEART RATE: 51 BPM | HEART RATE: 47 BPM | DIASTOLIC BLOOD PRESSURE: 54 MMHG | RESPIRATION RATE: 19 BRPM | OXYGEN SATURATION: 98 % | DIASTOLIC BLOOD PRESSURE: 75 MMHG | HEART RATE: 62 BPM | HEART RATE: 54 BPM | HEIGHT: 65 IN | RESPIRATION RATE: 21 BRPM | RESPIRATION RATE: 12 BRPM | RESPIRATION RATE: 24 BRPM | SYSTOLIC BLOOD PRESSURE: 141 MMHG | HEART RATE: 48 BPM | TEMPERATURE: 97.2 F | RESPIRATION RATE: 18 BRPM | SYSTOLIC BLOOD PRESSURE: 120 MMHG | DIASTOLIC BLOOD PRESSURE: 57 MMHG | DIASTOLIC BLOOD PRESSURE: 53 MMHG | HEART RATE: 52 BPM | SYSTOLIC BLOOD PRESSURE: 165 MMHG | RESPIRATION RATE: 27 BRPM | OXYGEN SATURATION: 94 % | OXYGEN SATURATION: 92 % | OXYGEN SATURATION: 91 %

## 2018-11-01 ENCOUNTER — OFFICE (OUTPATIENT)
Dept: URBAN - METROPOLITAN AREA PATHOLOGY 4 | Facility: PATHOLOGY | Age: 73
End: 2018-11-01
Payer: COMMERCIAL

## 2018-11-01 ENCOUNTER — AMBULATORY SURGICAL CENTER (OUTPATIENT)
Dept: URBAN - METROPOLITAN AREA SURGERY 17 | Facility: SURGERY | Age: 73
End: 2018-11-01
Payer: COMMERCIAL

## 2018-11-01 DIAGNOSIS — K31.7 POLYP OF STOMACH AND DUODENUM: ICD-10-CM

## 2018-11-01 DIAGNOSIS — K44.9 DIAPHRAGMATIC HERNIA WITHOUT OBSTRUCTION OR GANGRENE: ICD-10-CM

## 2018-11-01 DIAGNOSIS — R10.13 EPIGASTRIC PAIN: ICD-10-CM

## 2018-11-01 DIAGNOSIS — K21.0 GASTRO-ESOPHAGEAL REFLUX DISEASE WITH ESOPHAGITIS: ICD-10-CM

## 2018-11-01 DIAGNOSIS — R07.89 OTHER CHEST PAIN: ICD-10-CM

## 2018-11-01 LAB
GI HISTOLOGY: A. SELECT: (no result)
GI HISTOLOGY: B. SELECT: (no result)
GI HISTOLOGY: C. SELECT: (no result)
GI HISTOLOGY: PDF REPORT: (no result)

## 2018-11-01 PROCEDURE — 88305 TISSUE EXAM BY PATHOLOGIST: CPT | Performed by: INTERNAL MEDICINE

## 2018-11-01 PROCEDURE — 43239 EGD BIOPSY SINGLE/MULTIPLE: CPT | Performed by: INTERNAL MEDICINE

## 2018-11-05 ENCOUNTER — OFFICE VISIT (OUTPATIENT)
Dept: SLEEP MEDICINE | Facility: HOSPITAL | Age: 73
End: 2018-11-05
Attending: INTERNAL MEDICINE

## 2018-11-05 VITALS
BODY MASS INDEX: 42.25 KG/M2 | SYSTOLIC BLOOD PRESSURE: 135 MMHG | OXYGEN SATURATION: 98 % | WEIGHT: 253.6 LBS | DIASTOLIC BLOOD PRESSURE: 79 MMHG | HEIGHT: 65 IN | HEART RATE: 60 BPM

## 2018-11-05 DIAGNOSIS — Z99.89 OSA ON CPAP: Primary | ICD-10-CM

## 2018-11-05 DIAGNOSIS — G47.33 OSA ON CPAP: Primary | ICD-10-CM

## 2018-11-05 PROCEDURE — G0463 HOSPITAL OUTPT CLINIC VISIT: HCPCS

## 2018-11-05 NOTE — PROGRESS NOTES
"      [facility]         Dr Jazmin Murdock  [unfilled]  Patient Care Team:  Hortencia Jackman PA-C as PCP - General  Hortencia Jackman PA-C as PCP - Family Medicine  Clem Bowman MD as Consulting Physician (Hematology and Oncology)  Anirudh Adame MD as Consulting Physician (Cardiology)  Luke Hwang MD as Consulting Physician (Otolaryngology)  Yoseph Mars MD as Consulting Physician (Pulmonary Disease)  Fawad Rowland MD as Consulting Physician (Ophthalmology)  Jr Matthews MD as Consulting Physician (Hand Surgery)  Donavon Osuna MD as Consulting Physician (Gastroenterology)  Jazmin Murdock MD as Consulting Physician (Pulmonary Disease)    Chief Complaint: Follow-up new CPAP auto with A. fib hypertension and hypothyroidism    Interval History: Follow-up new CPAP.  Currently on auto CPAP 5-12 cm.  Compliance 100% average daily use 6 hours 57 minutes.  Leak and AHI are excellent.  Feels rested with the CPAP.  Benefiting from CPAP.  Likes her new CPAP machine.  Bedtime 11:30 PM awake times 6 AM.  No tobacco no alcohol or caffeine abuse.  Currently has nasal pillow.  Nasal benefits well.  Last mask change 3 months ago.    REVIEW OF SYSTEMS:   CARDIOVASCULAR: No chest pain, chest pressure or chest discomfort. No palpitations or edema.   RESPIRATORY: No shortness of breath, or sputum.   GASTROINTESTINAL: No anorexia, nausea, vomiting or diarrhea. No abdominal pain or blood.   HEMATOLOGIC: No bleeding or bruising.  Positive for postnasal drainage cough acid reflux and abdominal bloating     Ventilator/Non-Invasive Ventilation Settings     None            Vital Signs  Heart Rate:  [60] 60  BP: (135)/(79) 135/79  [unfilled]  Flowsheet Rows      First Filed Value   Admission Height  165.1 cm (65\") Documented at 11/05/2018 0900   Admission Weight  115 kg (253 lb 9.6 oz) Documented at 11/05/2018 0900          Physical Exam:   General Appearance:    Alert, cooperative, in no acute distress  ENT " Mallampati between 3 and 4    Lungs:     Clear to auscultation,respirations regular, even and                  unlabored    Heart:    Regular rhythm and normal rate, normal S1 and S2, no            murmur, no gallop, no rub, no click   Chest Wall:    No abnormalities observed   Abdomen:     Normal bowel sounds, no masses, no organomegaly, soft        non-tender, non-distended, no guarding, no rebound                tenderness   Extremities:   Moves all extremities well, no edema, no cyanosis, no             redness     Results Review:                                          I reviewed the patient's new clinical results.  I personally viewed and interpreted the patient's CXR        Medication Review:         No current facility-administered medications for this visit.     ASSESSMENT:   ERIC with hypothyroidism, A. fib, hypertension    PLAN:  Currently on new CPAP pressures of 6-12 cm working great for her  Compliance AHI and leak are all excellent  Sleep hygiene measures  Weight loss encouraged  She is benefiting from CPAP  Follow-up in 1 year    Jazmin Murdock MD  11/05/18  10:43 AM

## 2018-12-06 LAB
25(OH)D3+25(OH)D2 SERPL-MCNC: 25.3 NG/ML (ref 30–100)
ALBUMIN SERPL-MCNC: 4.4 G/DL (ref 3.5–5.2)
ALBUMIN/GLOB SERPL: 1.9 G/DL
ALP SERPL-CCNC: 98 U/L (ref 39–117)
ALT SERPL-CCNC: 16 U/L (ref 1–33)
AST SERPL-CCNC: 15 U/L (ref 1–32)
BASOPHILS # BLD AUTO: 0.01 10*3/MM3 (ref 0–0.2)
BASOPHILS NFR BLD AUTO: 0.2 % (ref 0–1.5)
BILIRUB SERPL-MCNC: 0.6 MG/DL (ref 0.1–1.2)
BUN SERPL-MCNC: 16 MG/DL (ref 8–23)
BUN/CREAT SERPL: 18 (ref 7–25)
CALCIUM SERPL-MCNC: 9.6 MG/DL (ref 8.6–10.5)
CHLORIDE SERPL-SCNC: 105 MMOL/L (ref 98–107)
CHOLEST SERPL-MCNC: 196 MG/DL (ref 0–200)
CO2 SERPL-SCNC: 26.5 MMOL/L (ref 22–29)
CREAT SERPL-MCNC: 0.89 MG/DL (ref 0.57–1)
EOSINOPHIL # BLD AUTO: 0 10*3/MM3 (ref 0–0.7)
EOSINOPHIL NFR BLD AUTO: 0 % (ref 0.3–6.2)
ERYTHROCYTE [DISTWIDTH] IN BLOOD BY AUTOMATED COUNT: 14.7 % (ref 11.7–13)
FOLATE SERPL-MCNC: 16.45 NG/ML (ref 4.78–24.2)
GLOBULIN SER CALC-MCNC: 2.3 GM/DL
GLUCOSE SERPL-MCNC: 118 MG/DL (ref 65–99)
HBA1C MFR BLD: 6.11 % (ref 4.8–5.6)
HCT VFR BLD AUTO: 45.4 % (ref 35.6–45.5)
HDLC SERPL-MCNC: 43 MG/DL (ref 40–60)
HGB BLD-MCNC: 13.9 G/DL (ref 11.9–15.5)
IMM GRANULOCYTES # BLD: 0.05 10*3/MM3 (ref 0–0.03)
IMM GRANULOCYTES NFR BLD: 0.8 % (ref 0–0.5)
LDLC SERPL CALC-MCNC: 118 MG/DL (ref 0–100)
LYMPHOCYTES # BLD AUTO: 2.71 10*3/MM3 (ref 0.9–4.8)
LYMPHOCYTES NFR BLD AUTO: 46 % (ref 19.6–45.3)
MCH RBC QN AUTO: 28.6 PG (ref 26.9–32)
MCHC RBC AUTO-ENTMCNC: 30.6 G/DL (ref 32.4–36.3)
MCV RBC AUTO: 93.4 FL (ref 80.5–98.2)
MONOCYTES # BLD AUTO: 0.49 10*3/MM3 (ref 0.2–1.2)
MONOCYTES NFR BLD AUTO: 8.3 % (ref 5–12)
NEUTROPHILS # BLD AUTO: 2.63 10*3/MM3 (ref 1.9–8.1)
NEUTROPHILS NFR BLD AUTO: 44.7 % (ref 42.7–76)
PLATELET # BLD AUTO: 149 10*3/MM3 (ref 140–500)
POTASSIUM SERPL-SCNC: 4.2 MMOL/L (ref 3.5–5.2)
PROT SERPL-MCNC: 6.7 G/DL (ref 6–8.5)
RBC # BLD AUTO: 4.86 10*6/MM3 (ref 3.9–5.2)
SODIUM SERPL-SCNC: 146 MMOL/L (ref 136–145)
T3 SERPL-MCNC: 100 NG/DL (ref 80–200)
T4 FREE SERPL-MCNC: 1.45 NG/DL (ref 0.93–1.7)
TRIGL SERPL-MCNC: 177 MG/DL (ref 0–150)
TSH SERPL DL<=0.005 MIU/L-ACNC: 2.99 MIU/ML (ref 0.27–4.2)
VIT B12 SERPL-MCNC: 289 PG/ML (ref 211–946)
VLDLC SERPL CALC-MCNC: 35.4 MG/DL (ref 5–40)
WBC # BLD AUTO: 5.89 10*3/MM3 (ref 4.5–10.7)

## 2019-03-24 RX ORDER — LOSARTAN POTASSIUM 25 MG/1
25 TABLET ORAL DAILY
Qty: 90 TABLET | Refills: 0 | Status: SHIPPED | OUTPATIENT
Start: 2019-03-24 | End: 2019-06-21 | Stop reason: SDUPTHER

## 2019-03-24 RX ORDER — LEVOTHYROXINE SODIUM 0.05 MG/1
TABLET ORAL
Qty: 90 TABLET | Refills: 0 | Status: SHIPPED | OUTPATIENT
Start: 2019-03-24 | End: 2019-06-21 | Stop reason: SDUPTHER

## 2019-03-24 RX ORDER — EZETIMIBE 10 MG/1
TABLET ORAL
Qty: 90 TABLET | Refills: 0 | Status: SHIPPED | OUTPATIENT
Start: 2019-03-24 | End: 2019-06-21 | Stop reason: SDUPTHER

## 2019-05-02 RX ORDER — METOPROLOL SUCCINATE 25 MG/1
TABLET, EXTENDED RELEASE ORAL
Qty: 90 TABLET | Refills: 1 | Status: SHIPPED | OUTPATIENT
Start: 2019-05-02 | End: 2019-10-18 | Stop reason: SDUPTHER

## 2019-06-21 RX ORDER — EZETIMIBE 10 MG/1
TABLET ORAL
Qty: 90 TABLET | Refills: 0 | Status: SHIPPED | OUTPATIENT
Start: 2019-06-21 | End: 2019-10-18 | Stop reason: SDUPTHER

## 2019-06-21 RX ORDER — LEVOTHYROXINE SODIUM 0.05 MG/1
TABLET ORAL
Qty: 90 TABLET | Refills: 0 | Status: SHIPPED | OUTPATIENT
Start: 2019-06-21 | End: 2019-10-18 | Stop reason: SDUPTHER

## 2019-06-21 RX ORDER — LOSARTAN POTASSIUM 25 MG/1
TABLET ORAL
Qty: 90 TABLET | Refills: 0 | Status: SHIPPED | OUTPATIENT
Start: 2019-06-21 | End: 2019-10-18 | Stop reason: SDUPTHER

## 2019-10-03 RX ORDER — LOSARTAN POTASSIUM 25 MG/1
TABLET ORAL
Qty: 90 TABLET | Refills: 4 | OUTPATIENT
Start: 2019-10-03

## 2019-10-03 RX ORDER — LEVOTHYROXINE SODIUM 0.05 MG/1
TABLET ORAL
Qty: 90 TABLET | Refills: 4 | OUTPATIENT
Start: 2019-10-03

## 2019-10-03 RX ORDER — EZETIMIBE 10 MG/1
TABLET ORAL
Qty: 90 TABLET | Refills: 4 | OUTPATIENT
Start: 2019-10-03

## 2019-10-18 ENCOUNTER — OFFICE VISIT (OUTPATIENT)
Dept: FAMILY MEDICINE CLINIC | Facility: CLINIC | Age: 74
End: 2019-10-18

## 2019-10-18 VITALS
OXYGEN SATURATION: 98 % | WEIGHT: 255 LBS | SYSTOLIC BLOOD PRESSURE: 122 MMHG | RESPIRATION RATE: 16 BRPM | DIASTOLIC BLOOD PRESSURE: 78 MMHG | BODY MASS INDEX: 42.49 KG/M2 | HEART RATE: 58 BPM | HEIGHT: 65 IN | TEMPERATURE: 98.6 F

## 2019-10-18 DIAGNOSIS — I10 ESSENTIAL HYPERTENSION: ICD-10-CM

## 2019-10-18 DIAGNOSIS — E78.2 MIXED HYPERLIPIDEMIA: ICD-10-CM

## 2019-10-18 DIAGNOSIS — R73.01 IMPAIRED FASTING GLUCOSE: Primary | ICD-10-CM

## 2019-10-18 DIAGNOSIS — K21.9 GASTROESOPHAGEAL REFLUX DISEASE WITHOUT ESOPHAGITIS: ICD-10-CM

## 2019-10-18 DIAGNOSIS — G47.33 OSA (OBSTRUCTIVE SLEEP APNEA): ICD-10-CM

## 2019-10-18 DIAGNOSIS — I44.0 FIRST DEGREE AV BLOCK: ICD-10-CM

## 2019-10-18 DIAGNOSIS — I87.2 VENOUS INSUFFICIENCY: ICD-10-CM

## 2019-10-18 DIAGNOSIS — Z86.711 HISTORY OF PULMONARY EMBOLISM: ICD-10-CM

## 2019-10-18 DIAGNOSIS — I48.0 PAROXYSMAL ATRIAL FIBRILLATION (HCC): ICD-10-CM

## 2019-10-18 PROCEDURE — 99214 OFFICE O/P EST MOD 30 MIN: CPT | Performed by: PHYSICIAN ASSISTANT

## 2019-10-18 RX ORDER — LOSARTAN POTASSIUM 25 MG/1
25 TABLET ORAL DAILY
Qty: 90 TABLET | Refills: 1 | Status: SHIPPED | OUTPATIENT
Start: 2019-10-18 | End: 2020-03-30

## 2019-10-18 RX ORDER — LEVOTHYROXINE SODIUM 0.05 MG/1
50 TABLET ORAL DAILY
Qty: 90 TABLET | Refills: 3 | Status: SHIPPED | OUTPATIENT
Start: 2019-10-18 | End: 2020-10-12 | Stop reason: SDUPTHER

## 2019-10-18 RX ORDER — MAGNESIUM 200 MG
1000 TABLET ORAL DAILY
Qty: 90 EACH | Refills: 3
Start: 2019-10-18

## 2019-10-18 RX ORDER — RISEDRONATE SODIUM 150 MG/1
150 TABLET, FILM COATED ORAL
Qty: 3 TABLET | Refills: 3 | Status: SHIPPED | OUTPATIENT
Start: 2019-10-18 | End: 2020-10-12 | Stop reason: SDUPTHER

## 2019-10-18 RX ORDER — METOPROLOL SUCCINATE 25 MG/1
TABLET, EXTENDED RELEASE ORAL
Qty: 90 TABLET | Refills: 1 | Status: SHIPPED | OUTPATIENT
Start: 2019-10-18 | End: 2020-10-12 | Stop reason: SDUPTHER

## 2019-10-18 RX ORDER — OMEPRAZOLE 40 MG/1
40 CAPSULE, DELAYED RELEASE ORAL 2 TIMES DAILY
Qty: 180 CAPSULE | Refills: 3 | Status: SHIPPED | OUTPATIENT
Start: 2019-10-18 | End: 2020-10-12 | Stop reason: SDUPTHER

## 2019-10-18 RX ORDER — EZETIMIBE 10 MG/1
10 TABLET ORAL DAILY
Qty: 90 TABLET | Refills: 3 | Status: SHIPPED | OUTPATIENT
Start: 2019-10-18 | End: 2020-10-12 | Stop reason: SDUPTHER

## 2019-10-18 NOTE — PROGRESS NOTES
"Subjective   Jessica Ames is a 74 y.o. female.     History of Present Illness      Since the last visit, she has overall felt tired.  She has Essential Hypertension and well controlled on current medication, Impaired fasting glucose and will monitor labs to watch for DMII, GERD controlled on PPI Rx, Hyperlipidemia with goals met with current Rx, Atrial Fibillation and remains under the care of their cardiologist for management, Hypothyroidism and must update labs to continue treatment and Vitamin D deficiency and will update labs for continued management.  she has been compliant with current medications have reviewed them.  The patient denies medication side effects.  Will refill medications. /78 (BP Location: Left arm, Patient Position: Sitting, Cuff Size: Large Adult)   Pulse 58   Temp 98.6 °F (37 °C) (Oral)   Resp 16   Ht 165.1 cm (65\")   Wt 116 kg (255 lb)   LMP  (LMP Unknown)   SpO2 98%   BMI 42.43 kg/m²     Results for orders placed or performed during the hospital encounter of 09/28/18   Stress Test With Pet Myocardial Perfusion (Multi-Study)   Result Value Ref Range    Nuclear Prior Study 2     BH CV STRESS PROTOCOL 1 Pharmacologic     Stage 1 1     HR Stage 1 88     BP Stage 1 145/62     Duration Min Stage 1 0     Duration Sec Stage 1 10     Stress Dose Regadenoson Stage 1 0.4     Stress Comments Stage 1 10 sec bolus injection     Baseline HR 47 bpm    Baseline /66 mmHg    Peak HR 88 bpm    Percent Max Pred HR 59.86 %    Percent Target HR 70 %    Peak /62 mmHg    Recovery HR 68 bpm    Recovery /56 mmHg    Target HR (85%) 125 bpm    Max. Pred. HR (100%) 147 bpm    Exercise duration (sec) 10 sec    Nuc Stress EF 73 %   Has Osteopenia 10-15 and cannot do Evista d/t stroke risk;  Will need DEXA Oct and then if develops Osteoporosis will consider Prolia.    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 Bowman  I want her to see cardio for a fib---she is on ASA  Use Cpap/Bipap every night     The following portions of the patient's history were reviewed and updated as appropriate: allergies, current medications, past family history, past medical history, past social history, past surgical history and problem list.    Review of Systems   Constitutional: Negative for activity change, appetite change and unexpected weight change.   HENT: Negative for nosebleeds and trouble swallowing.    Eyes: Negative for pain and visual disturbance.   Respiratory: Negative for chest tightness, shortness of breath and wheezing.    Cardiovascular: Negative for chest pain and palpitations.   Gastrointestinal: Negative for abdominal pain and blood in stool.   Endocrine: Negative.    Genitourinary: Negative for difficulty urinating and hematuria.   Musculoskeletal: Negative for joint swelling.   Skin: Negative for color change and rash.   Allergic/Immunologic: Negative.    Neurological: Negative for syncope and speech difficulty.   Hematological: Negative for adenopathy.   Psychiatric/Behavioral: Negative for agitation and confusion.   All other systems reviewed and are negative.      Objective   Physical Exam   Constitutional: She is oriented to person, place, and time. She appears well-developed and well-nourished. No distress.   HENT:   Head: Normocephalic and atraumatic.   Eyes: Conjunctivae and EOM are normal. Pupils are equal, round, and reactive to light. Right eye exhibits no discharge. Left eye exhibits no discharge. No scleral icterus.   Neck: Normal range of motion. Neck supple. No tracheal deviation present. No thyromegaly present.   Cardiovascular: Normal rate, regular rhythm, normal heart sounds, intact distal pulses and normal pulses. Exam reveals no gallop.   No murmur heard.  Trace pedal edema = bilat   Pulmonary/Chest: Effort normal  and breath sounds normal. No respiratory distress. She has no wheezes. She has no rales.   Abdominal: Tenderness: epigastric and RUQ.   Musculoskeletal: Normal range of motion.   Neurological: She is alert and oriented to person, place, and time. She exhibits normal muscle tone. Coordination normal.   Skin: Skin is warm. No rash noted. No erythema. No pallor.   Psychiatric: She has a normal mood and affect. Her behavior is normal. Judgment and thought content normal.   Nursing note and vitals reviewed.      Assessment/Plan   Jessica was seen today for hypertension.    Diagnoses and all orders for this visit:    Impaired fasting glucose  -     Comprehensive metabolic panel  -     Lipid panel  -     CBC and Differential  -     TSH  -     Hemoglobin A1c  -     T3, Free  -     T4, Free  -     Vitamin B12  -     Folate  -     Vitamin D 25 Hydroxy  -     Ferritin  -     Iron Profile    First degree AV block  -     Comprehensive metabolic panel  -     Lipid panel  -     CBC and Differential  -     TSH  -     Hemoglobin A1c  -     T3, Free  -     T4, Free  -     Vitamin B12  -     Folate  -     Vitamin D 25 Hydroxy  -     Ferritin  -     Iron Profile    ERIC (obstructive sleep apnea)  -     Comprehensive metabolic panel  -     Lipid panel  -     CBC and Differential  -     TSH  -     Hemoglobin A1c  -     T3, Free  -     T4, Free  -     Vitamin B12  -     Folate  -     Vitamin D 25 Hydroxy  -     Ferritin  -     Iron Profile    Mixed hyperlipidemia  -     Comprehensive metabolic panel  -     Lipid panel  -     CBC and Differential  -     TSH  -     Hemoglobin A1c  -     T3, Free  -     T4, Free  -     Vitamin B12  -     Folate  -     Vitamin D 25 Hydroxy  -     Ferritin  -     Iron Profile    Essential hypertension  -     Comprehensive metabolic panel  -     Lipid panel  -     CBC and Differential  -     TSH  -     Hemoglobin A1c  -     T3, Free  -     T4, Free  -     Vitamin B12  -     Folate  -     Vitamin D 25 Hydroxy  -      Ferritin  -     Iron Profile    Venous insufficiency  -     Comprehensive metabolic panel  -     Lipid panel  -     CBC and Differential  -     TSH  -     Hemoglobin A1c  -     T3, Free  -     T4, Free  -     Vitamin B12  -     Folate  -     Vitamin D 25 Hydroxy  -     Ferritin  -     Iron Profile    Paroxysmal atrial fibrillation (CMS/HCC)  -     Comprehensive metabolic panel  -     Lipid panel  -     CBC and Differential  -     TSH  -     Hemoglobin A1c  -     T3, Free  -     T4, Free  -     Vitamin B12  -     Folate  -     Vitamin D 25 Hydroxy  -     Ferritin  -     Iron Profile    Gastroesophageal reflux disease without esophagitis  -     Comprehensive metabolic panel  -     Lipid panel  -     CBC and Differential  -     TSH  -     Hemoglobin A1c  -     T3, Free  -     T4, Free  -     Vitamin B12  -     Folate  -     Vitamin D 25 Hydroxy  -     Ferritin  -     Iron Profile    History of pulmonary embolism  -     Comprehensive metabolic panel  -     Lipid panel  -     CBC and Differential  -     TSH  -     Hemoglobin A1c  -     T3, Free  -     T4, Free  -     Vitamin B12  -     Folate  -     Vitamin D 25 Hydroxy  -     Ferritin  -     Iron Profile    Other orders  -     Cyanocobalamin (VITAMIN B-12) 1000 MCG sublingual tablet; Place 1,000 mcg under the tongue Daily. One SL daily  -     risedronate (ACTONEL) 150 MG tablet; Take 1 tablet by mouth Every 30 (Thirty) Days. with water on empty stomach, nothing by mouth or lie down for next 30 minutes for Osteopenia  -     omeprazole (priLOSEC) 40 MG capsule; Take 1 capsule by mouth 2 (Two) Times a Day. For GERD  -     ezetimibe (ZETIA) 10 MG tablet; Take 1 tablet by mouth Daily. for cholesterol  -     levothyroxine (SYNTHROID, LEVOTHROID) 50 MCG tablet; Take 1 tablet by mouth Daily. For thyroid  -     metoprolol succinate XL (TOPROL-XL) 25 MG 24 hr tablet; TAKE ONE-HALF (1/2) TO ONE TABLET DAILY FOR BLOOD PRESSURE AND HEART RATE CONTROL  -     losartan (COZAAR) 25 MG  tablet; Take 1 tablet by mouth Daily. for blood pressure      Plan, Jessica Ames, was seen today.  she was seen for HTN and continue medication, Imparied fasting glucose and plan follow up labs, diet, and exercise, GERD and will continue on PPI medication, Hyperlipidemia and will continue current medication, Atrial Fibrillation and remains under the care of their cardiologist for medical management, Hypothyroidism and will need to update labs for continued treatment and Vitamin D deficiency and will update labs .  Labs also for hematologist ----iron infusions  See cardio  Start Actonel for Osteopenia and watch GERD  PPI at BID per GI

## 2019-10-19 LAB
25(OH)D3+25(OH)D2 SERPL-MCNC: 38.7 NG/ML (ref 30–100)
ALBUMIN SERPL-MCNC: 4.9 G/DL (ref 3.5–5.2)
ALBUMIN/GLOB SERPL: 2.1 G/DL
ALP SERPL-CCNC: 90 U/L (ref 39–117)
ALT SERPL-CCNC: 19 U/L (ref 1–33)
AST SERPL-CCNC: 17 U/L (ref 1–32)
BASOPHILS # BLD AUTO: 0.01 10*3/MM3 (ref 0–0.2)
BASOPHILS NFR BLD AUTO: 0.2 % (ref 0–1.5)
BILIRUB SERPL-MCNC: 0.7 MG/DL (ref 0.2–1.2)
BUN SERPL-MCNC: 12 MG/DL (ref 8–23)
BUN/CREAT SERPL: 11.5 (ref 7–25)
CALCIUM SERPL-MCNC: 9.5 MG/DL (ref 8.6–10.5)
CHLORIDE SERPL-SCNC: 102 MMOL/L (ref 98–107)
CHOLEST SERPL-MCNC: 214 MG/DL (ref 0–200)
CO2 SERPL-SCNC: 28.9 MMOL/L (ref 22–29)
CREAT SERPL-MCNC: 1.04 MG/DL (ref 0.57–1)
EOSINOPHIL # BLD AUTO: 0 10*3/MM3 (ref 0–0.4)
EOSINOPHIL NFR BLD AUTO: 0 % (ref 0.3–6.2)
ERYTHROCYTE [DISTWIDTH] IN BLOOD BY AUTOMATED COUNT: 14.5 % (ref 12.3–15.4)
FERRITIN SERPL-MCNC: 60.2 NG/ML (ref 13–150)
FOLATE SERPL-MCNC: 18.8 NG/ML (ref 4.78–24.2)
GLOBULIN SER CALC-MCNC: 2.3 GM/DL
GLUCOSE SERPL-MCNC: 117 MG/DL (ref 65–99)
HBA1C MFR BLD: 6.3 % (ref 4.8–5.6)
HCT VFR BLD AUTO: 44.1 % (ref 34–46.6)
HDLC SERPL-MCNC: 41 MG/DL (ref 40–60)
HGB BLD-MCNC: 14.3 G/DL (ref 12–15.9)
IMM GRANULOCYTES # BLD AUTO: 0.03 10*3/MM3 (ref 0–0.05)
IMM GRANULOCYTES NFR BLD AUTO: 0.5 % (ref 0–0.5)
IRON SATN MFR SERPL: 17 % (ref 20–50)
IRON SERPL-MCNC: 84 MCG/DL (ref 37–145)
LDLC SERPL CALC-MCNC: 124 MG/DL (ref 0–100)
LYMPHOCYTES # BLD AUTO: 2.18 10*3/MM3 (ref 0.7–3.1)
LYMPHOCYTES NFR BLD AUTO: 33.6 % (ref 19.6–45.3)
MCH RBC QN AUTO: 28.7 PG (ref 26.6–33)
MCHC RBC AUTO-ENTMCNC: 32.4 G/DL (ref 31.5–35.7)
MCV RBC AUTO: 88.4 FL (ref 79–97)
MONOCYTES # BLD AUTO: 0.63 10*3/MM3 (ref 0.1–0.9)
MONOCYTES NFR BLD AUTO: 9.7 % (ref 5–12)
NEUTROPHILS # BLD AUTO: 3.64 10*3/MM3 (ref 1.7–7)
NEUTROPHILS NFR BLD AUTO: 56 % (ref 42.7–76)
NRBC BLD AUTO-RTO: 0 /100 WBC (ref 0–0.2)
PLATELET # BLD AUTO: 173 10*3/MM3 (ref 140–450)
POTASSIUM SERPL-SCNC: 4.7 MMOL/L (ref 3.5–5.2)
PROT SERPL-MCNC: 7.2 G/DL (ref 6–8.5)
RBC # BLD AUTO: 4.99 10*6/MM3 (ref 3.77–5.28)
SODIUM SERPL-SCNC: 145 MMOL/L (ref 136–145)
T3FREE SERPL-MCNC: 2.8 PG/ML (ref 2–4.4)
T4 FREE SERPL-MCNC: 1.41 NG/DL (ref 0.93–1.7)
TIBC SERPL-MCNC: 489 MCG/DL
TRIGL SERPL-MCNC: 245 MG/DL (ref 0–150)
TSH SERPL DL<=0.005 MIU/L-ACNC: 2.98 UIU/ML (ref 0.27–4.2)
UIBC SERPL-MCNC: 405 MCG/DL (ref 112–346)
VIT B12 SERPL-MCNC: 866 PG/ML (ref 211–946)
VLDLC SERPL CALC-MCNC: 49 MG/DL
WBC # BLD AUTO: 6.49 10*3/MM3 (ref 3.4–10.8)

## 2019-10-21 ENCOUNTER — OFFICE VISIT (OUTPATIENT)
Dept: SLEEP MEDICINE | Facility: HOSPITAL | Age: 74
End: 2019-10-21
Attending: INTERNAL MEDICINE

## 2019-10-21 VITALS
DIASTOLIC BLOOD PRESSURE: 84 MMHG | SYSTOLIC BLOOD PRESSURE: 163 MMHG | WEIGHT: 255 LBS | HEIGHT: 65 IN | BODY MASS INDEX: 42.49 KG/M2 | HEART RATE: 51 BPM | OXYGEN SATURATION: 96 %

## 2019-10-21 DIAGNOSIS — G47.33 OSA ON CPAP: Primary | ICD-10-CM

## 2019-10-21 DIAGNOSIS — Z99.89 OSA ON CPAP: Primary | ICD-10-CM

## 2019-10-21 PROCEDURE — G0463 HOSPITAL OUTPT CLINIC VISIT: HCPCS

## 2019-10-21 NOTE — PROGRESS NOTES
"      [facility]         Dr Jazmin Murdock  [unfilled]  Patient Care Team:  Hortencia Jackman PA-C as PCP - General  Hortencia Jackman PA-C as PCP - Family Medicine  Clem Bowman MD as Consulting Physician (Hematology and Oncology)  Anirudh Adame MD as Consulting Physician (Cardiology)  Luke Hwang MD as Consulting Physician (Otolaryngology)  Yoseph Mars MD as Consulting Physician (Pulmonary Disease)  Fawad Rowland MD as Consulting Physician (Ophthalmology)  Jr Matthews MD as Consulting Physician (Hand Surgery)  Donavon Osuna MD as Consulting Physician (Gastroenterology)  Jazmin Murdock MD as Consulting Physician (Pulmonary Disease)  Parminder Mejia Jr., MD as Consulting Physician (Vascular Surgery)    Chief Complaint:ERIC with hypothyroidism, A. fib, hypertension    Interval History: Patient here for annual compliance visit.  Compliance 100% average daily use of 6 hours 42 minutes.  AHI and leak are excellent.  Patient feels benefit from CPAP.  Currently on auto CPAP at 5 to 12 cm.  Reports going to bed at 11:30 PM gets up 6 AM.  Gets about 6 and half hours of sleep and feels rested.  No tobacco no alcohol no caffeine abuse.  Currently has a nasal pillow that fits well.  Surprise 5 out of 24 within normal limits    REVIEW OF SYSTEMS:   CARDIOVASCULAR: No chest pain, chest pressure or chest discomfort. No palpitations or edema.   RESPIRATORY: No shortness of breath, cough or sputum.   GASTROINTESTINAL: No anorexia, nausea, vomiting or diarrhea. No abdominal pain or blood.   HEMATOLOGIC: No bleeding or bruising.     Ventilator/Non-Invasive Ventilation Settings (From admission, onward)    None            Vital Signs  Heart Rate:  [51] 51  BP: (163)/(84) 163/84  [unfilled]  Flowsheet Rows      First Filed Value   Admission Height  165.1 cm (65\") Documented at 10/21/2019 1054   Admission Weight  116 kg (255 lb) Documented at 10/21/2019 1054          Physical Exam:   General " Appearance:    Alert, cooperative, in no acute distress   Lungs:     Clear to auscultation,respirations regular, even and                  unlabored  ENT Mallampati between 3 and 4 normal nasal exam    Heart:    Regular rhythm and normal rate, normal S1 and S2, no            murmur, no gallop, no rub, no click   Chest Wall:    No abnormalities observed   Abdomen:     Normal bowel sounds, no masses, no organomegaly, soft        non-tender, non-distended, no guarding, no rebound                tenderness   Extremities:   Moves all extremities well, no edema, no cyanosis, no             redness     Results Review:        Results from last 7 days   Lab Units 10/18/19  1126   SODIUM mmol/L 145   POTASSIUM mmol/L 4.7   CHLORIDE mmol/L 102   TOTAL CO2 mmol/L 28.9   BUN mg/dL 12   CREATININE mg/dL 1.04*   CALCIUM mg/dL 9.5         Results from last 7 days   Lab Units 10/18/19  1126   WBC 10*3/mm3 6.49   HEMOGLOBIN g/dL 14.3   HEMATOCRIT % 44.1   PLATELETS 10*3/mm3 173                 Results from last 7 days   Lab Units 10/18/19  1126   TRIGLYCERIDES mg/dL 245*   HDL CHOL mg/dL 41   LDL CHOL mg/dL 124*           I reviewed the patient's new clinical results.  I personally viewed and interpreted the patient's CXR        Medication Review:         No current facility-administered medications for this visit.     ASSESSMENT:   ERIC with hypothyroidism, A. fib, hypertension    PLAN:  Reviewed compliance download  Excellent compliance AHI leak  Sleep hygiene measures  Weight loss encouraged  Continue auto CPAP at 5 to 12 cm  She is benefiting from the machine  Supplies be renewed  Follow-up in 1 year    Jazmin Murdock MD  10/21/19  11:18 AM

## 2019-10-22 ENCOUNTER — OFFICE VISIT (OUTPATIENT)
Dept: CARDIOLOGY | Facility: CLINIC | Age: 74
End: 2019-10-22

## 2019-10-22 VITALS
DIASTOLIC BLOOD PRESSURE: 86 MMHG | BODY MASS INDEX: 42.05 KG/M2 | HEIGHT: 65 IN | SYSTOLIC BLOOD PRESSURE: 120 MMHG | HEART RATE: 72 BPM | WEIGHT: 252.4 LBS | OXYGEN SATURATION: 96 %

## 2019-10-22 DIAGNOSIS — I48.0 PAROXYSMAL ATRIAL FIBRILLATION (HCC): Primary | ICD-10-CM

## 2019-10-22 DIAGNOSIS — I10 ESSENTIAL HYPERTENSION: ICD-10-CM

## 2019-10-22 DIAGNOSIS — G47.33 OSA (OBSTRUCTIVE SLEEP APNEA): ICD-10-CM

## 2019-10-22 PROCEDURE — 93000 ELECTROCARDIOGRAM COMPLETE: CPT | Performed by: INTERNAL MEDICINE

## 2019-10-22 PROCEDURE — 99214 OFFICE O/P EST MOD 30 MIN: CPT | Performed by: INTERNAL MEDICINE

## 2019-10-22 NOTE — PROGRESS NOTES
Date of Office Visit: 10/22/2019    Patient Name: Jessica Ames  : 1945    Encounter Provider: Anirudh Adame MD  Referring Provider: No ref. provider found  Place of Service: [facility] CARDIOLOGY  Patient Care Team:  Hortencia Jackman PA-C as PCP - General  Hortencia Jackman PA-C as PCP - Family Medicine  Clem Bowman MD as Consulting Physician (Hematology and Oncology)  Anirudh Adame MD as Consulting Physician (Cardiology)  Luke Hwang MD as Consulting Physician (Otolaryngology)  Yoseph Mars MD as Consulting Physician (Pulmonary Disease)  Fawad Rowland MD as Consulting Physician (Ophthalmology)  Jr Matthews MD as Consulting Physician (Hand Surgery)  Donavon Osuna MD as Consulting Physician (Gastroenterology)  Jazmin Murdock MD as Consulting Physician (Pulmonary Disease)  Parminder Mejia Jr., MD as Consulting Physician (Vascular Surgery)      Chief Complaint   Patient presents with   • Atrial Fibrillation     1 yr follow up     History of Present Illness    The patient is a 74-year-old white female with a history of obstructive sleep apnea as well as paroxysmal atrial fibrillation.  In the last year she is noted an increase in the frequency of her events.  She is to have maybe 2 or 3-year.  In the last month she has had 2 episodes that each last about 3-1/2 hours.  She tracks her heart rate with her iPhone.  Her rates get up into the 150 range.  She does have some associated shortness of breath and fatigue with this.  Occasional she will notice some substernal chest discomfort.    Past Medical History:   Diagnosis Date   • Disease of thyroid gland    • DVT (deep venous thrombosis) (CMS/[facility])     right leg; s/p knee replacement; was on xarelto and now baby aspirin; followed by Dr. Parminder Mejia     • Factor V Leiden (CMS/[facility])    • First degree AV block 2012   • GERD (gastroesophageal reflux disease) 2012   • Hiatal hernia     • Hyperlipidemia    • Hypertension    • IFG (impaired fasting glucose)    • Iron deficiency anemia     sees Dr. Clem Bowman    • Left anterior fascicular block 12/7/2012   • Lower extremity edema    • Obesity 12/7/2012   • ERIC (obstructive sleep apnea)     compliant with CPAP machine    • PAF (paroxysmal atrial fibrillation) (CMS/HCC) 11/2015    day after colonoscopy went into atrial fibrillation; was on Xarelto and now baby aspirin    • Pulmonary embolism (CMS/HCC) 2013    also had DVT    • PVC's (premature ventricular contractions) 12/7/2012   • Venous insufficiency     followed by Dr. Parminder Mejia          Past Surgical History:   Procedure Laterality Date   • BREAST SURGERY      reduction   • CATH LAB PROCEDURE     • HAND SURGERY     • HYSTERECTOMY     • ROTATOR CUFF REPAIR     • TOTAL KNEE ARTHROPLASTY             Current Outpatient Medications:   •  aspirin 81 MG EC tablet, Take 81 mg by mouth Daily., Disp: , Rfl:   •  Cholecalciferol 2000 units capsule, Take 2,000 Units by mouth Daily. One PO daily, Disp: 90 each, Rfl: 3  •  Cyanocobalamin (VITAMIN B-12) 1000 MCG sublingual tablet, Place 1,000 mcg under the tongue Daily. One SL daily, Disp: 90 each, Rfl: 3  •  ezetimibe (ZETIA) 10 MG tablet, Take 1 tablet by mouth Daily. for cholesterol, Disp: 90 tablet, Rfl: 3  •  levothyroxine (SYNTHROID, LEVOTHROID) 50 MCG tablet, Take 1 tablet by mouth Daily. For thyroid, Disp: 90 tablet, Rfl: 3  •  losartan (COZAAR) 25 MG tablet, Take 1 tablet by mouth Daily. for blood pressure, Disp: 90 tablet, Rfl: 1  •  metoprolol succinate XL (TOPROL-XL) 25 MG 24 hr tablet, TAKE ONE-HALF (1/2) TO ONE TABLET DAILY FOR BLOOD PRESSURE AND HEART RATE CONTROL, Disp: 90 tablet, Rfl: 1  •  omeprazole (priLOSEC) 40 MG capsule, Take 1 capsule by mouth 2 (Two) Times a Day. For GERD, Disp: 180 capsule, Rfl: 3  •  risedronate (ACTONEL) 150 MG tablet, Take 1 tablet by mouth Every 30 (Thirty) Days. with water on empty stomach, nothing by  "mouth or lie down for next 30 minutes for Osteopenia, Disp: 3 tablet, Rfl: 3      Social History     Socioeconomic History   • Marital status:      Spouse name: Not on file   • Number of children: Not on file   • Years of education: Not on file   • Highest education level: Not on file   Tobacco Use   • Smoking status: Never Smoker   • Smokeless tobacco: Never Used   • Tobacco comment: minimal caffeine   Substance and Sexual Activity   • Alcohol use: Yes     Comment: \" once month\"   • Drug use: No   • Sexual activity: Defer         Review of Systems   Constitution: Positive for malaise/fatigue.   HENT: Negative.    Eyes: Negative.    Cardiovascular: Positive for dyspnea on exertion and palpitations.   Respiratory: Negative.    Endocrine: Negative.    Skin: Negative.    Musculoskeletal: Negative.    Gastrointestinal: Negative.    Neurological: Negative.    Psychiatric/Behavioral: Negative.        Procedures      ECG 12 Lead  Date/Time: 10/22/2019 11:32 AM  Performed by: Anirudh Adame MD  Authorized by: Anirudh Adame MD   Comparison: compared with previous ECG from 9/25/2018  Similar to previous ECG  Rhythm: sinus rhythm  Conduction: non-specific intraventricular conduction delay  Other findings: left ventricular hypertrophy                Objective:    /86 (BP Location: Left arm, Patient Position: Sitting, Cuff Size: Large Adult)   Pulse 72   Ht 165.1 cm (65\")   Wt 114 kg (252 lb 6.4 oz)   LMP  (LMP Unknown)   SpO2 96%   BMI 42.00 kg/m²         Physical Exam   Constitutional: She is oriented to person, place, and time. She appears well-developed and well-nourished.   HENT:   Head: Normocephalic.   Eyes: Pupils are equal, round, and reactive to light.   Neck: Normal range of motion. No JVD present. Carotid bruit is not present. No thyromegaly present.   Cardiovascular: Normal rate, regular rhythm, S1 normal, S2 normal, normal heart sounds and intact distal pulses. Exam reveals no gallop " and no friction rub.   No murmur heard.  Pulmonary/Chest: Effort normal and breath sounds normal.   Abdominal: Soft. Bowel sounds are normal.   Musculoskeletal: She exhibits no edema.   Neurological: She is alert and oriented to person, place, and time.   Skin: Skin is warm, dry and intact. No erythema.   Psychiatric: She has a normal mood and affect.   Vitals reviewed.          Assessment:       Diagnosis Plan   1. Paroxysmal atrial fibrillation (CMS/[facility])  Cardiac Event Monitor   2. ERIC (obstructive sleep apnea)     3. Essential hypertension         1.  Paroxysmal atrial fibrillation: The patient appears to be having increased events.  I am going to have her wear a 30-day event recorder to see if we can capture her events.  Her chads 2 Vasc score is 3.  She may need to now be on anticoagulation.  Underlying feeling because of her bradycardia on low-dose beta-blockers that she has sick sinus syndrome.    2.  Obstructive sleep apnea: Presently being treated    3.  Hypertension: Controlled     Plan:

## 2019-10-25 ENCOUNTER — TELEPHONE (OUTPATIENT)
Dept: CARDIOLOGY | Facility: CLINIC | Age: 74
End: 2019-10-25

## 2019-10-25 NOTE — TELEPHONE ENCOUNTER
Please let her know it should not be a problem from a cardiac standpoint, but she should consult with her pharmacist for potential drug interactions.  She does have a history of GERD and these medications can cause stomach upset which is something she be aware of and discuss further with her PCP if she experiences any problems.

## 2019-10-25 NOTE — TELEPHONE ENCOUNTER
[phone]  10:29 am    Pt called stating she was here on 10/22/19 and had a 30 day monitor placed.  She did see her PCP and she wants to start her on Actonel once a month.  Pt would like to make sure this is ok with other current meds.  Please advise.     ASA 81 po QD  Vit D 2000u po QD  Vit B12 1000mcg po QD  Zetia 10mg po QD  Levothyroxine 50mcg po daily  Losartan 25mg po QD  Metoprolol Succ 25mg po QD  Omeprazole 40mg po BID     Thanks, [facility]

## 2019-11-04 ENCOUNTER — TELEPHONE (OUTPATIENT)
Dept: CARDIOLOGY | Facility: CLINIC | Age: 74
End: 2019-11-04

## 2019-11-25 ENCOUNTER — TELEPHONE (OUTPATIENT)
Dept: CARDIOLOGY | Facility: CLINIC | Age: 74
End: 2019-11-25

## 2019-12-03 NOTE — TELEPHONE ENCOUNTER
Patient has called again for results of 30 day monitor.  Nadia do we have an update or any idea when it will be ready.  [facility] RMA

## 2019-12-04 NOTE — TELEPHONE ENCOUNTER
Spoke with [facility] they still have not received the mailed monitor but will expedite the End of Study.

## 2020-03-30 RX ORDER — LOSARTAN POTASSIUM 25 MG/1
TABLET ORAL
Qty: 90 TABLET | Refills: 0 | Status: SHIPPED | OUTPATIENT
Start: 2020-03-30 | End: 2020-06-29

## 2020-04-20 ENCOUNTER — RESULTS ENCOUNTER (OUTPATIENT)
Dept: FAMILY MEDICINE CLINIC | Facility: CLINIC | Age: 75
End: 2020-04-20

## 2020-04-20 DIAGNOSIS — K21.9 GASTROESOPHAGEAL REFLUX DISEASE WITHOUT ESOPHAGITIS: ICD-10-CM

## 2020-04-20 DIAGNOSIS — G47.33 OSA (OBSTRUCTIVE SLEEP APNEA): ICD-10-CM

## 2020-04-20 DIAGNOSIS — E78.2 MIXED HYPERLIPIDEMIA: ICD-10-CM

## 2020-04-20 DIAGNOSIS — I87.2 VENOUS INSUFFICIENCY: ICD-10-CM

## 2020-04-20 DIAGNOSIS — I48.0 PAROXYSMAL ATRIAL FIBRILLATION (HCC): ICD-10-CM

## 2020-04-20 DIAGNOSIS — I10 ESSENTIAL HYPERTENSION: ICD-10-CM

## 2020-04-20 DIAGNOSIS — I44.0 FIRST DEGREE AV BLOCK: ICD-10-CM

## 2020-04-20 DIAGNOSIS — R73.01 IMPAIRED FASTING GLUCOSE: ICD-10-CM

## 2020-06-03 LAB
ALBUMIN SERPL-MCNC: 4.2 G/DL (ref 3.5–5.2)
ALBUMIN/GLOB SERPL: 1.7 G/DL
ALP SERPL-CCNC: 64 U/L (ref 39–117)
ALT SERPL-CCNC: 17 U/L (ref 1–33)
AST SERPL-CCNC: 14 U/L (ref 1–32)
BILIRUB SERPL-MCNC: 0.5 MG/DL (ref 0.2–1.2)
BUN SERPL-MCNC: 14 MG/DL (ref 8–23)
BUN/CREAT SERPL: 15.9 (ref 7–25)
CALCIUM SERPL-MCNC: 9.5 MG/DL (ref 8.6–10.5)
CHLORIDE SERPL-SCNC: 108 MMOL/L (ref 98–107)
CHOLEST SERPL-MCNC: 202 MG/DL (ref 0–200)
CO2 SERPL-SCNC: 27.8 MMOL/L (ref 22–29)
CREAT SERPL-MCNC: 0.88 MG/DL (ref 0.57–1)
GLOBULIN SER CALC-MCNC: 2.5 GM/DL
GLUCOSE SERPL-MCNC: 118 MG/DL (ref 65–99)
HBA1C MFR BLD: 6.2 % (ref 4.8–5.6)
HDLC SERPL-MCNC: 43 MG/DL (ref 40–60)
LDLC SERPL CALC-MCNC: 117 MG/DL (ref 0–100)
POTASSIUM SERPL-SCNC: 4.7 MMOL/L (ref 3.5–5.2)
PROT SERPL-MCNC: 6.7 G/DL (ref 6–8.5)
SODIUM SERPL-SCNC: 145 MMOL/L (ref 136–145)
TRIGL SERPL-MCNC: 211 MG/DL (ref 0–150)
VLDLC SERPL CALC-MCNC: 42.2 MG/DL

## 2020-06-29 RX ORDER — LOSARTAN POTASSIUM 25 MG/1
TABLET ORAL
Qty: 90 TABLET | Refills: 0 | Status: SHIPPED | OUTPATIENT
Start: 2020-06-29 | End: 2020-10-12 | Stop reason: SDUPTHER

## 2020-09-29 RX ORDER — RISEDRONATE SODIUM 150 MG/1
TABLET, FILM COATED ORAL
Qty: 3 TABLET | Refills: 3 | OUTPATIENT
Start: 2020-09-29

## 2020-09-29 RX ORDER — EZETIMIBE 10 MG/1
TABLET ORAL
Qty: 90 TABLET | Refills: 3 | OUTPATIENT
Start: 2020-09-29

## 2020-09-29 RX ORDER — LEVOTHYROXINE SODIUM 0.05 MG/1
TABLET ORAL
Qty: 90 TABLET | Refills: 3 | OUTPATIENT
Start: 2020-09-29

## 2020-09-29 RX ORDER — OMEPRAZOLE 40 MG/1
CAPSULE, DELAYED RELEASE ORAL
Qty: 180 CAPSULE | Refills: 3 | OUTPATIENT
Start: 2020-09-29

## 2020-09-29 RX ORDER — LOSARTAN POTASSIUM 25 MG/1
TABLET ORAL
Qty: 90 TABLET | Refills: 3 | OUTPATIENT
Start: 2020-09-29

## 2020-09-29 RX ORDER — METOPROLOL SUCCINATE 25 MG/1
TABLET, EXTENDED RELEASE ORAL
Qty: 90 TABLET | Refills: 3 | OUTPATIENT
Start: 2020-09-29

## 2020-10-12 ENCOUNTER — OFFICE VISIT (OUTPATIENT)
Dept: FAMILY MEDICINE CLINIC | Facility: CLINIC | Age: 75
End: 2020-10-12

## 2020-10-12 VITALS
BODY MASS INDEX: 43.99 KG/M2 | WEIGHT: 264 LBS | TEMPERATURE: 97.1 F | HEIGHT: 65 IN | SYSTOLIC BLOOD PRESSURE: 124 MMHG | OXYGEN SATURATION: 98 % | HEART RATE: 53 BPM | RESPIRATION RATE: 16 BRPM | DIASTOLIC BLOOD PRESSURE: 70 MMHG

## 2020-10-12 DIAGNOSIS — I10 ESSENTIAL HYPERTENSION: Primary | ICD-10-CM

## 2020-10-12 DIAGNOSIS — I48.21 PERMANENT ATRIAL FIBRILLATION (HCC): ICD-10-CM

## 2020-10-12 DIAGNOSIS — D68.51 FACTOR 5 LEIDEN MUTATION, HETEROZYGOUS (HCC): ICD-10-CM

## 2020-10-12 DIAGNOSIS — R73.01 IMPAIRED FASTING GLUCOSE: ICD-10-CM

## 2020-10-12 DIAGNOSIS — E03.9 HYPOTHYROIDISM, ACQUIRED: ICD-10-CM

## 2020-10-12 DIAGNOSIS — M85.89 OSTEOPENIA OF MULTIPLE SITES: ICD-10-CM

## 2020-10-12 DIAGNOSIS — Z78.0 POST-MENOPAUSAL: ICD-10-CM

## 2020-10-12 DIAGNOSIS — E78.2 MIXED HYPERLIPIDEMIA: ICD-10-CM

## 2020-10-12 DIAGNOSIS — G47.33 OSA (OBSTRUCTIVE SLEEP APNEA): ICD-10-CM

## 2020-10-12 DIAGNOSIS — K21.9 GASTROESOPHAGEAL REFLUX DISEASE WITHOUT ESOPHAGITIS: ICD-10-CM

## 2020-10-12 DIAGNOSIS — E61.1 IRON DEFICIENCY: ICD-10-CM

## 2020-10-12 PROBLEM — I26.99 PE (PULMONARY THROMBOEMBOLISM): Status: ACTIVE | Noted: 2020-01-08

## 2020-10-12 PROBLEM — I82.409 DVT (DEEP VENOUS THROMBOSIS): Status: ACTIVE | Noted: 2020-01-08

## 2020-10-12 PROBLEM — I48.91 A-FIB: Status: ACTIVE | Noted: 2020-01-08

## 2020-10-12 PROBLEM — Z79.01 CHRONIC ANTICOAGULATION: Status: ACTIVE | Noted: 2020-01-08

## 2020-10-12 PROCEDURE — 99214 OFFICE O/P EST MOD 30 MIN: CPT | Performed by: PHYSICIAN ASSISTANT

## 2020-10-12 RX ORDER — LEVOTHYROXINE SODIUM 0.05 MG/1
50 TABLET ORAL DAILY
Qty: 90 TABLET | Refills: 3 | Status: SHIPPED | OUTPATIENT
Start: 2020-10-12 | End: 2021-09-13

## 2020-10-12 RX ORDER — METOPROLOL SUCCINATE 25 MG/1
TABLET, EXTENDED RELEASE ORAL
Qty: 90 TABLET | Refills: 1 | Status: SHIPPED | OUTPATIENT
Start: 2020-10-12 | End: 2021-04-05

## 2020-10-12 RX ORDER — RISEDRONATE SODIUM 150 MG/1
150 TABLET, FILM COATED ORAL
Qty: 4 TABLET | Refills: 3 | Status: SHIPPED | OUTPATIENT
Start: 2020-10-12 | End: 2020-11-19

## 2020-10-12 RX ORDER — LOSARTAN POTASSIUM 25 MG/1
25 TABLET ORAL DAILY
Qty: 90 TABLET | Refills: 1 | Status: SHIPPED | OUTPATIENT
Start: 2020-10-12 | End: 2021-04-05

## 2020-10-12 RX ORDER — EZETIMIBE 10 MG/1
10 TABLET ORAL DAILY
Qty: 90 TABLET | Refills: 3 | Status: SHIPPED | OUTPATIENT
Start: 2020-10-12 | End: 2021-09-13

## 2020-10-12 RX ORDER — OMEPRAZOLE 40 MG/1
40 CAPSULE, DELAYED RELEASE ORAL 2 TIMES DAILY
Qty: 180 CAPSULE | Refills: 3 | Status: SHIPPED | OUTPATIENT
Start: 2020-10-12 | End: 2021-09-13

## 2020-10-12 NOTE — PATIENT INSTRUCTIONS
Dyslipidemia  Dyslipidemia is an imbalance of waxy, fat-like substances (lipids) in the blood. The body needs lipids in small amounts. Dyslipidemia often involves a high level of cholesterol or triglycerides, which are types of lipids.  Common forms of dyslipidemia include:  · High levels of LDL cholesterol. LDL is the type of cholesterol that causes fatty deposits (plaques) to build up in the blood vessels that carry blood away from your heart (arteries).  · Low levels of HDL cholesterol. HDL cholesterol is the type of cholesterol that protects against heart disease. High levels of HDL remove the LDL buildup from arteries.  · High levels of triglycerides. Triglycerides are a fatty substance in the blood that is linked to a buildup of plaques in the arteries.  What are the causes?  Primary dyslipidemia is caused by changes (mutations) in genes that are passed down through families (inherited). These mutations cause several types of dyslipidemia.  Secondary dyslipidemia is caused by lifestyle choices and diseases that lead to dyslipidemia, such as:  · Eating a diet that is high in animal fat.  · Not getting enough exercise.  · Having diabetes, kidney disease, liver disease, or thyroid disease.  · Drinking large amounts of alcohol.  · Using certain medicines.  What increases the risk?  You are more likely to develop this condition if you are an older man or if you are a woman who has gone through menopause. Other risk factors include:  · Having a family history of dyslipidemia.  · Taking certain medicines, including birth control pills, steroids, some diuretics, and beta-blockers.  · Smoking cigarettes.  · Eating a high-fat diet.  · Having certain medical conditions such as diabetes, polycystic ovary syndrome (PCOS), kidney disease, liver disease, or hypothyroidism.  · Not exercising regularly.  · Being overweight or obese with too much belly fat.  What are the signs or symptoms?  In most cases, dyslipidemia does not  usually cause any symptoms.  In severe cases, very high lipid levels can cause:  · Fatty bumps under the skin (xanthomas).  · White or gray ring around the black center (pupil) of the eye.  Very high triglyceride levels can cause inflammation of the pancreas (pancreatitis).  How is this diagnosed?  Your health care provider may diagnose dyslipidemia based on a routine blood test (fasting blood test). Because most people do not have symptoms of the condition, this blood testing (lipid profile) is done on adults age 20 and older and is repeated every 5 years. This test checks:  · Total cholesterol. This measures the total amount of cholesterol in your blood, including LDL cholesterol, HDL cholesterol, and triglycerides. A healthy number is below 200.  · LDL cholesterol. The target number for LDL cholesterol is different for each person, depending on individual risk factors. Ask your health care provider what your LDL cholesterol should be.  · HDL cholesterol. An HDL level of 60 or higher is best because it helps to protect against heart disease. A number below 40 for men or below 50 for women increases the risk for heart disease.  · Triglycerides. A healthy triglyceride number is below 150.  If your lipid profile is abnormal, your health care provider may do other blood tests.  How is this treated?  Treatment depends on the type of dyslipidemia that you have and your other risk factors for heart disease and stroke. Your health care provider will have a target range for your lipid levels based on this information.  For many people, this condition may be treated by lifestyle changes, such as diet and exercise. Your health care provider may recommend that you:  · Get regular exercise.  · Make changes to your diet.  · Quit smoking if you smoke.  If diet changes and exercise do not help you reach your goals, your health care provider may also prescribe medicine to lower lipids. The most commonly prescribed type of medicine  lowers your LDL cholesterol (statin drug). If you have a high triglyceride level, your provider may prescribe another type of drug (fibrate) or an omega-3 fish oil supplement, or both.  Follow these instructions at home:    Eating and drinking  · Follow instructions from your health care provider or dietitian about eating or drinking restrictions.  · Eat a healthy diet as told by your health care provider. This can help you reach and maintain a healthy weight, lower your LDL cholesterol, and raise your HDL cholesterol. This may include:  ? Limiting your calories, if you are overweight.  ? Eating more fruits, vegetables, whole grains, fish, and lean meats.  ? Limiting saturated fat, trans fat, and cholesterol.  · If you drink alcohol:  ? Limit how much you use.  ? Be aware of how much alcohol is in your drink. In the [place] one drink equals one 12 oz bottle of beer (355 mL), one 5 oz glass of wine (148 mL), or one 1½ oz glass of hard liquor (44 mL).  · Do not drink alcohol if:  ? Your health care provider tells you not to drink.  ? You are pregnant, may be pregnant, or are planning to become pregnant.  Activity  · Get regular exercise. Start an exercise and strength training program as told by your health care provider. Ask your health care provider what activities are safe for you. Your health care provider may recommend:  ? 30 minutes of aerobic activity 4-6 days a week. Brisk walking is an example of aerobic activity.  ? Strength training 2 days a week.  General instructions  · Do not use any products that contain nicotine or tobacco, such as cigarettes, e-cigarettes, and chewing tobacco. If you need help quitting, ask your health care provider.  · Take over-the-counter and prescription medicines only as told by your health care provider. This includes supplements.  · Keep all follow-up visits as told by your health care provider.  Contact a health care provider if:  · You are:  ? Having trouble sticking to your  exercise or diet plan.  ? Struggling to quit smoking or control your use of alcohol.  Summary  · Dyslipidemia often involves a high level of cholesterol or triglycerides, which are types of lipids.  · Treatment depends on the type of dyslipidemia that you have and your other risk factors for heart disease and stroke.  · For many people, treatment starts with lifestyle changes, such as diet and exercise.  · Your health care provider may prescribe medicine to lower lipids.  This information is not intended to replace advice given to you by your health care provider. Make sure you discuss any questions you have with your health care provider.  Document Released: 12/23/2014 Document Revised: 08/12/2019 Document Reviewed: 07/19/2019  [employer] Patient Education © 2020 [employer]

## 2020-10-13 LAB
25(OH)D3+25(OH)D2 SERPL-MCNC: 41 NG/ML (ref 30–100)
ALBUMIN SERPL-MCNC: 4.4 G/DL (ref 3.7–4.7)
ALBUMIN/GLOB SERPL: 1.9 {RATIO} (ref 1.2–2.2)
ALP SERPL-CCNC: 73 IU/L (ref 39–117)
ALT SERPL-CCNC: 14 IU/L (ref 0–32)
AST SERPL-CCNC: 14 IU/L (ref 0–40)
BASOPHILS # BLD AUTO: 0 X10E3/UL (ref 0–0.2)
BASOPHILS NFR BLD AUTO: 0 %
BILIRUB SERPL-MCNC: 0.5 MG/DL (ref 0–1.2)
BUN SERPL-MCNC: 21 MG/DL (ref 8–27)
BUN/CREAT SERPL: 22 (ref 12–28)
CALCIUM SERPL-MCNC: 9.6 MG/DL (ref 8.7–10.3)
CHLORIDE SERPL-SCNC: 104 MMOL/L (ref 96–106)
CHOLEST SERPL-MCNC: 190 MG/DL (ref 100–199)
CO2 SERPL-SCNC: 24 MMOL/L (ref 20–29)
CREAT SERPL-MCNC: 0.97 MG/DL (ref 0.57–1)
EOSINOPHIL # BLD AUTO: 0 X10E3/UL (ref 0–0.4)
EOSINOPHIL NFR BLD AUTO: 0 %
ERYTHROCYTE [DISTWIDTH] IN BLOOD BY AUTOMATED COUNT: 14.2 % (ref 11.7–15.4)
FERRITIN SERPL-MCNC: 47 NG/ML (ref 15–150)
FOLATE SERPL-MCNC: 8.5 NG/ML
GLOBULIN SER CALC-MCNC: 2.3 G/DL (ref 1.5–4.5)
GLUCOSE SERPL-MCNC: 105 MG/DL (ref 65–99)
HBA1C MFR BLD: 6 % (ref 4.8–5.6)
HCT VFR BLD AUTO: 42.7 % (ref 34–46.6)
HDLC SERPL-MCNC: 39 MG/DL
HGB BLD-MCNC: 13.9 G/DL (ref 11.1–15.9)
IMM GRANULOCYTES # BLD AUTO: 0 X10E3/UL (ref 0–0.1)
IMM GRANULOCYTES NFR BLD AUTO: 1 %
IRON SATN MFR SERPL: 19 % (ref 15–55)
IRON SERPL-MCNC: 73 UG/DL (ref 27–139)
LDLC SERPL CALC-MCNC: 118 MG/DL (ref 0–99)
LYMPHOCYTES # BLD AUTO: 2 X10E3/UL (ref 0.7–3.1)
LYMPHOCYTES NFR BLD AUTO: 37 %
MCH RBC QN AUTO: 28.7 PG (ref 26.6–33)
MCHC RBC AUTO-ENTMCNC: 32.6 G/DL (ref 31.5–35.7)
MCV RBC AUTO: 88 FL (ref 79–97)
MONOCYTES # BLD AUTO: 0.5 X10E3/UL (ref 0.1–0.9)
MONOCYTES NFR BLD AUTO: 10 %
NEUTROPHILS # BLD AUTO: 3 X10E3/UL (ref 1.4–7)
NEUTROPHILS NFR BLD AUTO: 52 %
PLATELET # BLD AUTO: 145 X10E3/UL (ref 150–450)
POTASSIUM SERPL-SCNC: 4.7 MMOL/L (ref 3.5–5.2)
PROT SERPL-MCNC: 6.7 G/DL (ref 6–8.5)
RBC # BLD AUTO: 4.84 X10E6/UL (ref 3.77–5.28)
SODIUM SERPL-SCNC: 142 MMOL/L (ref 134–144)
T3FREE SERPL-MCNC: 2.6 PG/ML (ref 2–4.4)
T4 FREE SERPL-MCNC: 1.34 NG/DL (ref 0.82–1.77)
TIBC SERPL-MCNC: 379 UG/DL (ref 250–450)
TRIGL SERPL-MCNC: 186 MG/DL (ref 0–149)
TSH SERPL DL<=0.005 MIU/L-ACNC: 2.95 UIU/ML (ref 0.45–4.5)
UIBC SERPL-MCNC: 306 UG/DL (ref 118–369)
VIT B12 SERPL-MCNC: 584 PG/ML (ref 232–1245)
VLDLC SERPL CALC-MCNC: 33 MG/DL (ref 5–40)
WBC # BLD AUTO: 5.6 X10E3/UL (ref 3.4–10.8)

## 2020-10-15 ENCOUNTER — TELEPHONE (OUTPATIENT)
Dept: CARDIOLOGY | Facility: CLINIC | Age: 75
End: 2020-10-15

## 2020-10-15 DIAGNOSIS — I48.0 PAROXYSMAL ATRIAL FIBRILLATION (HCC): Primary | ICD-10-CM

## 2020-10-15 NOTE — TELEPHONE ENCOUNTER
Patient notified and verbalized understanding. She has an appointment already scheduled on 11/3/20.    Schedulers: the order for a 48 hour holter is in.  Will you please get her on the schedule?    Thank you,  Milagros Pérez RN  [facility]  Triage

## 2020-10-15 NOTE — TELEPHONE ENCOUNTER
Monitor that I would like to get.  As far as her being on metoprolol is perfectly appropriate for her to be on that medication at this time.  I need her to make a follow-up appointment for reevaluation.

## 2020-10-15 NOTE — TELEPHONE ENCOUNTER
Dr. Adame,    Ms. Ames has been having more frequent episodes of Afib lately. She has had 3 in the last 3 weeks compared to 2 episodes this year until now. The episodes typically happen at night or early AM and last between 2-3 hours.No CP or SOA, light headed or dizziness noted.  Her HR at the highest was 153 average BP is 140s/70s.  Normal HR for her is in the 50s though she states that a four weeks ago her HR was down to 38-40. She doesn't recall any symptoms with that.    Ms. Ames is also concerned that she was diagnosed with sick sinus syndrome and has read that she shouldn't be on metoprolol with that diagnosis.  She takes 12.5mg at 4pm daily.    Any recommendations for her?    Thank you,  Milagros Pérez RN  [facility]  Triage

## 2020-10-22 ENCOUNTER — HOSPITAL ENCOUNTER (OUTPATIENT)
Dept: CARDIOLOGY | Facility: HOSPITAL | Age: 75
Discharge: HOME | End: 2020-10-22
Admitting: INTERNAL MEDICINE

## 2020-10-22 DIAGNOSIS — I48.0 PAROXYSMAL ATRIAL FIBRILLATION (HCC): ICD-10-CM

## 2020-10-22 PROCEDURE — 93225 XTRNL ECG REC<48 HRS REC: CPT

## 2020-10-22 PROCEDURE — 93226 XTRNL ECG REC<48 HR SCAN A/R: CPT

## 2020-10-30 PROCEDURE — 93227 XTRNL ECG REC<48 HR R&I: CPT | Performed by: INTERNAL MEDICINE

## 2020-11-03 ENCOUNTER — OFFICE VISIT (OUTPATIENT)
Dept: CARDIOLOGY | Facility: CLINIC | Age: 75
End: 2020-11-03

## 2020-11-03 VITALS
OXYGEN SATURATION: 99 % | HEIGHT: 65 IN | DIASTOLIC BLOOD PRESSURE: 82 MMHG | SYSTOLIC BLOOD PRESSURE: 130 MMHG | BODY MASS INDEX: 43.18 KG/M2 | HEART RATE: 63 BPM | WEIGHT: 259.2 LBS

## 2020-11-03 DIAGNOSIS — G47.33 OSA (OBSTRUCTIVE SLEEP APNEA): ICD-10-CM

## 2020-11-03 DIAGNOSIS — I48.0 PAROXYSMAL ATRIAL FIBRILLATION (HCC): Primary | ICD-10-CM

## 2020-11-03 PROCEDURE — 93000 ELECTROCARDIOGRAM COMPLETE: CPT | Performed by: INTERNAL MEDICINE

## 2020-11-03 PROCEDURE — 99214 OFFICE O/P EST MOD 30 MIN: CPT | Performed by: INTERNAL MEDICINE

## 2020-11-03 NOTE — PROGRESS NOTES
Date of Office Visit: 2020    Patient Name: Jessica Ames  : 1945    Encounter Provider: Anirudh Adame MD  Referring Provider: No ref. provider found  Place of Service: [facility] CARDIOLOGY  Patient Care Team:  Hortencia Jackman PA-C as PCP - General  Hortencia Jackman PA-C as PCP - Family Medicine  Clem Bowman MD as Consulting Physician (Hematology and Oncology)  Anirudh Adame MD as Consulting Physician (Cardiology)  Luke Hwang MD as Consulting Physician (Otolaryngology)  Yoseph Mars MD as Consulting Physician (Pulmonary Disease)  Fawad Rowland MD as Consulting Physician (Ophthalmology)  Jr Matthews MD as Consulting Physician (Hand Surgery)  Donavon Osuna MD as Consulting Physician (Gastroenterology)  Jazmin Murdock MD as Consulting Physician (Pulmonary Disease)  Parminder Mejia Jr., MD as Consulting Physician (Vascular Surgery)      Chief Complaint   Patient presents with   • Atrial Fibrillation     History of Present Illness    Patient is a 75-year-old white female with a history of obstructive sleep apnea and paroxysmal atrial fibrillation.  She was last in the office in 2019.  Recent Holter monitor was placed on the patient because of episodes of atrial fibrillation.  The 48-hour monitor was essentially benign.    The patient brought in a diary of her events.  She has had 4 episodes in the last 2-1/2 months.  The last episode she had was .  The episode lasted for 2-1/2 hours.  Each of the episodes generally occur at night waking her up.  She believes she is in atrial fibrillation based on the reading of her watch.    The patient does have obstructive sleep apnea and she is obese.  She believes her sleep apparatus is working appropriately.  She has gained about 40 pounds in the last few months and is now struggling to get this off.    Past Medical History:   Diagnosis Date   • Disease of thyroid gland     • DVT (deep venous thrombosis) (CMS/[facility]) 2013    right leg; s/p knee replacement; was on xarelto and now baby aspirin; followed by Dr. Parminder Mejia     • Factor V Leiden (CMS/[facility])    • First degree AV block 12/7/2012   • GERD (gastroesophageal reflux disease) 12/7/2012   • Hiatal hernia    • Hyperlipidemia    • Hypertension    • IFG (impaired fasting glucose)    • Iron deficiency anemia     sees Dr. Clem Bowman    • Left anterior fascicular block 12/7/2012   • Lower extremity edema    • Obesity 12/7/2012   • ERIC (obstructive sleep apnea)     compliant with CPAP machine    • PAF (paroxysmal atrial fibrillation) (CMS/[facility]) 11/2015    day after colonoscopy went into atrial fibrillation; was on Xarelto and now baby aspirin    • Pulmonary embolism (CMS/[facility]) 2013    also had DVT    • PVC's (premature ventricular contractions) 12/7/2012   • Venous insufficiency     followed by Dr. Parminder Mejia          Past Surgical History:   Procedure Laterality Date   • BREAST SURGERY      reduction   • CATH LAB PROCEDURE     • HAND SURGERY     • HYSTERECTOMY     • ROTATOR CUFF REPAIR     • TOTAL KNEE ARTHROPLASTY             Current Outpatient Medications:   •  aspirin 81 MG EC tablet, Take 81 mg by mouth Daily., Disp: , Rfl:   •  Cholecalciferol 2000 units capsule, Take 2,000 Units by mouth Daily. One PO daily, Disp: 90 each, Rfl: 3  •  Cyanocobalamin (VITAMIN B-12) 1000 MCG sublingual tablet, Place 1,000 mcg under the tongue Daily. One SL daily, Disp: 90 each, Rfl: 3  •  ezetimibe (ZETIA) 10 MG tablet, Take 1 tablet by mouth Daily. for cholesterol, Disp: 90 tablet, Rfl: 3  •  levothyroxine (SYNTHROID, LEVOTHROID) 50 MCG tablet, Take 1 tablet by mouth Daily. For thyroid, Disp: 90 tablet, Rfl: 3  •  losartan (COZAAR) 25 MG tablet, Take 1 tablet by mouth Daily. for blood pressure, Disp: 90 tablet, Rfl: 1  •  metoprolol succinate XL (TOPROL-XL) 25 MG 24 hr tablet, TAKE ONE-HALF (1/2) TO ONE TABLET DAILY FOR BLOOD PRESSURE AND HEART  "RATE CONTROL, Disp: 90 tablet, Rfl: 1  •  omeprazole (priLOSEC) 40 MG capsule, Take 1 capsule by mouth 2 (Two) Times a Day. For GERD, Disp: 180 capsule, Rfl: 3  •  risedronate (ACTONEL) 150 MG tablet, Take 1 tablet by mouth Every 30 (Thirty) Days. with water on empty stomach, nothing by mouth or lie down for next 30 minutes for Osteopenia, Disp: 4 tablet, Rfl: 3      Social History     Socioeconomic History   • Marital status:      Spouse name: Not on file   • Number of children: Not on file   • Years of education: Not on file   • Highest education level: Not on file   Tobacco Use   • Smoking status: Never Smoker   • Smokeless tobacco: Never Used   • Tobacco comment: minimal caffeine   Substance and Sexual Activity   • Alcohol use: Yes     Comment: \" once month\"   • Drug use: No   • Sexual activity: Defer         Review of Systems   Constitution: Negative.   HENT: Negative.    Eyes: Negative.    Cardiovascular: Positive for dyspnea on exertion, leg swelling and palpitations.   Respiratory: Negative.    Endocrine: Negative.    Skin: Negative.    Musculoskeletal: Negative.    Gastrointestinal: Negative.    Neurological: Positive for light-headedness.   Psychiatric/Behavioral: Negative.        Procedures      ECG 12 Lead    Date/Time: 11/3/2020 9:18 AM  Performed by: Anirudh Adame MD  Authorized by: Anirudh Adame MD   Comparison: compared with previous ECG from 10/22/2019  Rhythm: sinus rhythm  Rate: normal  Conduction: 1st degree AV block and non-specific intraventricular conduction delay  QRS axis: normal                  Objective:    /82 (BP Location: Left arm, Patient Position: Sitting, Cuff Size: Adult)   Pulse 63   Ht 165.1 cm (65\")   Wt 118 kg (259 lb 3.2 oz)   LMP  (LMP Unknown)   SpO2 99%   BMI 43.13 kg/m²         Vitals signs reviewed.   Constitutional:       Appearance: Well-developed. Obese.   Eyes:      Pupils: Pupils are equal, round, and reactive to light.   HENT:      " Head: Normocephalic.   Neck:      Musculoskeletal: Normal range of motion.      Thyroid: No thyromegaly.      Vascular: No carotid bruit or JVD.   Pulmonary:      Effort: Pulmonary effort is normal.      Breath sounds: Normal breath sounds.   Cardiovascular:      Normal rate. Regular rhythm.      No gallop.   Pulses:     Intact distal pulses.   Edema:     Peripheral edema absent.   Abdominal:      General: Bowel sounds are normal.      Palpations: Abdomen is soft.   Skin:     General: Skin is warm and dry.      Findings: No erythema.   Neurological:      Mental Status: Alert and oriented to person, place, and time.             Assessment:       Diagnosis Plan   1. Paroxysmal atrial fibrillation (CMS/HCC)     2. ERIC (obstructive sleep apnea)       1.  Atrial Fibrillation and Atrial Flutter  Assessment  • The patient has paroxysmal atrial fibrillation  • The patient's CHADS2-VASc score is 4  • A [id number] score of 2 or more is considered a high risk for a thromboembolic event    Plan  • Continue aspirin for antithrombotic therapy, bleeding issues discussed  • Continue beta blocker for rhythm control    Going to increase her metoprolol to 25 mg daily.  She is presently on 12.5 mg of metoprolol succinate daily.  If she has any further events I am going to place her on anticoagulation.  I did discuss with her internal loop recorder as well.    The patient will call me back with any further events    2.  Obstructive sleep apnea: Patient is going to have a follow-up visit with her sleep apnea physician.  She might need an adjustment in her apparatus    3.  Obesity: Encourage weight loss     Plan:

## 2020-11-10 ENCOUNTER — HOSPITAL ENCOUNTER (OUTPATIENT)
Dept: BONE DENSITY | Facility: HOSPITAL | Age: 75
Discharge: HOME | End: 2020-11-10
Admitting: PHYSICIAN ASSISTANT

## 2020-11-10 DIAGNOSIS — Z78.0 POST-MENOPAUSAL: ICD-10-CM

## 2020-11-10 PROCEDURE — 77080 DXA BONE DENSITY AXIAL: CPT

## 2020-11-11 ENCOUNTER — TELEPHONE (OUTPATIENT)
Dept: FAMILY MEDICINE CLINIC | Facility: CLINIC | Age: 75
End: 2020-11-11

## 2020-11-11 DIAGNOSIS — M81.0 OSTEOPOROSIS WITHOUT CURRENT PATHOLOGICAL FRACTURE, UNSPECIFIED OSTEOPOROSIS TYPE: Primary | ICD-10-CM

## 2020-11-11 NOTE — TELEPHONE ENCOUNTER
----- Message from Jessica Ames sent at 11/11/2020  4:00 PM EST -----  Regarding: Test Results Question  Contact: [phone]  Hortencia,    You know my health and medical history, if you think I can handle the injections without any incidents than I am willing to get them.  Do not want anything new to affect my heart, blood pressure, blood issues or any of my other conditions.  Please advise the next step.   Thank you,  Jessica      Patient has failed oral bisphosphonate and now has osteoporosis need to submit to her insurance on Prol[employer]--- need to see if her insurance will cover this

## 2020-11-16 ENCOUNTER — OFFICE VISIT (OUTPATIENT)
Dept: SLEEP MEDICINE | Facility: HOSPITAL | Age: 75
End: 2020-11-16

## 2020-11-16 VITALS
WEIGHT: 257 LBS | OXYGEN SATURATION: 98 % | HEART RATE: 61 BPM | HEIGHT: 65 IN | DIASTOLIC BLOOD PRESSURE: 89 MMHG | SYSTOLIC BLOOD PRESSURE: 190 MMHG | BODY MASS INDEX: 42.82 KG/M2

## 2020-11-16 DIAGNOSIS — G47.33 OSA ON CPAP: Primary | ICD-10-CM

## 2020-11-16 DIAGNOSIS — Z99.89 OSA ON CPAP: Primary | ICD-10-CM

## 2020-11-16 PROCEDURE — G0463 HOSPITAL OUTPT CLINIC VISIT: HCPCS

## 2020-11-16 NOTE — PROGRESS NOTES
[facility]         Dr Jazmin Murdock  [unfilled]  Patient Care Team:  Hortencia Jackman PA-C as PCP - General  Hortencia Jackman PA-C as PCP - Family Medicine  Clem Bowman MD as Consulting Physician (Hematology and Oncology)  Anirudh Adame MD as Consulting Physician (Cardiology)  Luke Hwang MD as Consulting Physician (Otolaryngology)  Yoseph Mars MD as Consulting Physician (Pulmonary Disease)  Fawad Rowland MD as Consulting Physician (Ophthalmology)  Jr Matthews MD as Consulting Physician (Hand Surgery)  Donavon Osuna MD as Consulting Physician (Gastroenterology)  Jazmin Murdock MD as Consulting Physician (Pulmonary Disease)  Parminder Mejia Jr., MD as Consulting Physician (Vascular Surgery)    Chief Complaint:ERIC with hypothyroidism, A. fib, hypertension    Interval History: Patient here for annual compliance visit.  As recall his underlying history of ERIC with hypothyroidism.  Currently on auto CPAP 5 to 12 cm.  Compliance today is 97% with average daily use 6 hours 48 minutes.  AHI is 1.9 events per hour leak of 1 minute.  Patient benefiting from CPAP.  She has concerns that she is having bouts of A. fib possibly related to untreated ERIC.  No breakthrough snoring reported.  Goes to bed 1130 gets up 630 gets about 7 hours of sleep and feels rested.  No tobacco no alcohol no caffeine abuse.  Currently has a nasal pillow that fits well.  Supplies have been adequate.    REVIEW OF SYSTEMS:   CARDIOVASCULAR: No chest pain, chest pressure or chest discomfort. No palpitations or edema.   RESPIRATORY: No shortness of breath, cough or sputum.   GASTROINTESTINAL: No anorexia, nausea, vomiting or diarrhea. No abdominal pain or blood.   HEMATOLOGIC: No bleeding or bruising.  [place] 6 out of 24 within normal limits    Ventilator/Non-Invasive Ventilation Settings (From admission, onward)    None            Vital Signs  Heart Rate:  [61] 61  BP: (190)/(89)  "190/89  [unfilled]  Flowsheet Rows      First Filed Value   Admission Height  165.1 cm (65\") Documented at 11/16/2020 0811   Admission Weight  117 kg (257 lb) Documented at 11/16/2020 0811          Physical Exam:  Patient is examined using the personal protective equipment as per guidelines from infection control for this particular patient as enacted.  Hand hygiene was performed before and after patient interaction.   General Appearance:    Alert, cooperative, in no acute distress.  Following simple commands  Neck midline trachea no thyromegaly   Lungs:     Clear to auscultation,respirations regular, even and                  unlabored  ENT Mallampati between 3 and 4 normal nasal exam    Heart:    Regular rhythm and normal rate, normal S1 and S2, no            murmur, no gallop, no rub, no click   Chest Wall:    No abnormalities observed   Abdomen:     Normal bowel sounds, no masses, no organomegaly, soft        non-tender, non-distended, no guarding, no rebound                tenderness   Extremities:   Moves all extremities well, no edema, no cyanosis, no             redness  CNS no focal neurological deficits normal sensory exam  Skin no rashes no nodules  Musculoskeletal no cyanosis no clubbing normal range of motion     Results Review:                                          I reviewed the patient's new clinical results.  I personally viewed and interpreted the patient's CXR        Medication Review:       No current facility-administered medications for this visit.       ASSESSMENT:   ERIC on CPAP  Hypothyroidism  A. fib  Hypertension      PLAN:  Reviewed download with the patient.  She has concerns whether his ERIC is causing breakthrough A. fib.  I reviewed data in detail with the patient she is not having any residual events.  ERIC is adequately treated and currently no adjustment of pressures is required.  I have asked her to do some good sleep hygiene measures weight loss encouraged.  Her metoprolol dose " has been adjusted by cardiologist for uncontrolled A. fib  Long-term weight loss encouraged  Treatment of underlying comorbidities  Follow-up in 1 year    Jazmin Murdock MD  11/16/20  08:28 EST

## 2020-11-19 PROBLEM — M81.0 OSTEOPOROSIS WITHOUT CURRENT PATHOLOGICAL FRACTURE: Status: ACTIVE | Noted: 2020-11-19

## 2020-12-16 ENCOUNTER — APPOINTMENT (OUTPATIENT)
Dept: ONCOLOGY | Facility: HOSPITAL | Age: 75
End: 2020-12-16

## 2021-01-18 ENCOUNTER — APPOINTMENT (OUTPATIENT)
Dept: VACCINE CLINIC | Facility: HOSPITAL | Age: 76
End: 2021-01-18

## 2021-01-19 ENCOUNTER — IMMUNIZATION (OUTPATIENT)
Dept: VACCINE CLINIC | Facility: HOSPITAL | Age: 76
End: 2021-01-19

## 2021-01-19 PROCEDURE — 0001A: CPT | Performed by: INTERNAL MEDICINE

## 2021-01-19 PROCEDURE — 91300 HC SARSCOV02 VAC 30MCG/0.3ML IM: CPT | Performed by: INTERNAL MEDICINE

## 2021-02-03 ENCOUNTER — TELEPHONE (OUTPATIENT)
Dept: CARDIOLOGY | Facility: CLINIC | Age: 76
End: 2021-02-03

## 2021-02-03 NOTE — TELEPHONE ENCOUNTER
Patient called and wanted to let you know she had her first AFIB episode last night that lasted 2 1/2-3 hours since you changed her meds at the last visit.

## 2021-02-09 ENCOUNTER — IMMUNIZATION (OUTPATIENT)
Dept: VACCINE CLINIC | Facility: HOSPITAL | Age: 76
End: 2021-02-09

## 2021-02-09 PROCEDURE — 0002A: CPT | Performed by: INTERNAL MEDICINE

## 2021-02-09 PROCEDURE — 91300 HC SARSCOV02 VAC 30MCG/0.3ML IM: CPT | Performed by: INTERNAL MEDICINE

## 2021-02-23 RX ORDER — RISEDRONATE SODIUM 150 MG/1
150 TABLET, FILM COATED ORAL
COMMUNITY
Start: 2021-01-09 | End: 2021-12-22

## 2021-02-24 ENCOUNTER — OFFICE VISIT (OUTPATIENT)
Dept: CARDIOLOGY | Facility: CLINIC | Age: 76
End: 2021-02-24

## 2021-02-24 VITALS
HEART RATE: 65 BPM | DIASTOLIC BLOOD PRESSURE: 80 MMHG | BODY MASS INDEX: 43.35 KG/M2 | HEIGHT: 65 IN | WEIGHT: 260.2 LBS | SYSTOLIC BLOOD PRESSURE: 140 MMHG | OXYGEN SATURATION: 98 %

## 2021-02-24 DIAGNOSIS — I48.0 PAROXYSMAL ATRIAL FIBRILLATION (HCC): Primary | ICD-10-CM

## 2021-02-24 DIAGNOSIS — I10 ESSENTIAL HYPERTENSION: ICD-10-CM

## 2021-02-24 DIAGNOSIS — G47.33 OSA (OBSTRUCTIVE SLEEP APNEA): ICD-10-CM

## 2021-02-24 DIAGNOSIS — E66.01 CLASS 3 SEVERE OBESITY DUE TO EXCESS CALORIES WITH BODY MASS INDEX (BMI) OF 40.0 TO 44.9 IN ADULT, UNSPECIFIED WHETHER SERIOUS COMORBIDITY PRESENT (HCC): ICD-10-CM

## 2021-02-24 PROCEDURE — 93000 ELECTROCARDIOGRAM COMPLETE: CPT | Performed by: INTERNAL MEDICINE

## 2021-02-24 PROCEDURE — 99214 OFFICE O/P EST MOD 30 MIN: CPT | Performed by: INTERNAL MEDICINE

## 2021-02-24 NOTE — PROGRESS NOTES
Date of Office Visit: 2021    Patient Name: Jessica Ames  : 1945    Encounter Provider: Anirudh Adame MD  Referring Provider: No ref. provider found  Place of Service: [facility] C[facility]  Patient Care Team:  Hortencia Jackman PA-C as PCP - General  Hortencia Jackman PA-C as PCP - Family Medicine  Clem Bowman MD as Consulting Physician (Hematology and Oncology)  Anirudh Adame MD as Consulting Physician (Cardiology)  Luke Hwang MD as Consulting Physician (Otolaryngology)  Yoseph Mars MD as Consulting Physician (Pulmonary Disease)  Fawad Rowland MD as Consulting Physician (Ophthalmology)  Jr Matthews MD as Consulting Physician (Hand Surgery)  Donavon Osuna MD as Consulting Physician (Gastroenterology)  Jazmin Murdock MD as Consulting Physician (Pulmonary Disease)  Parminder Mejia Jr., MD as Consulting Physician (Vascular Surgery)      Chief Complaint   Patient presents with   • Atrial Fibrillation     History of Present Illness    The patient is a 75-year-old white female with a history of paroxysmal atrial fibrillation who returns to the office today for a follow-up.  Since her last visit the patient has had one episode of atrial fibrillation lasting for approximately 2-1/2 hours.  Prior to her increase in metoprolol last year the patient was having more frequent episodes.  The last episode she had was in October of this year.    The patient has not been sick with COVID-19.  She denies any change in her medical history.  Is no particular reason why she developed the atrial fibrillation that day.  Her comorbid conditions include her obstructive sleep apnea as well as her obesity.  Past Medical History:   Diagnosis Date   • Disease of thyroid gland    • DVT (deep venous thrombosis) (CMS/[facility])     right leg; s/p knee replacement; was on xarelto and now baby aspirin; followed by Dr. Parminder Mejia     • Factor V Leiden  (CMS/[facility])    • First degree AV block 12/7/2012   • GERD (gastroesophageal reflux disease) 12/7/2012   • Hiatal hernia    • Hyperlipidemia    • Hypertension    • IFG (impaired fasting glucose)    • Iron deficiency anemia     sees Dr. Clem Bowman    • Left anterior fascicular block 12/7/2012   • Lower extremity edema    • Obesity 12/7/2012   • ERIC (obstructive sleep apnea)     compliant with CPAP machine    • PAF (paroxysmal atrial fibrillation) (CMS/[facility]) 11/2015    day after colonoscopy went into atrial fibrillation; was on Xarelto and now baby aspirin    • Pulmonary embolism (CMS/[facility]) 2013    also had DVT    • PVC's (premature ventricular contractions) 12/7/2012   • Venous insufficiency     followed by Dr. Parminder Mejia          Past Surgical History:   Procedure Laterality Date   • BREAST SURGERY      reduction   • CATH LAB PROCEDURE     • HAND SURGERY     • HYSTERECTOMY     • ROTATOR CUFF REPAIR     • TOTAL KNEE ARTHROPLASTY             Current Outpatient Medications:   •  aspirin 81 MG EC tablet, Take 81 mg by mouth Daily., Disp: , Rfl:   •  Cholecalciferol 2000 units capsule, Take 2,000 Units by mouth Daily. One PO daily, Disp: 90 each, Rfl: 3  •  Cyanocobalamin (VITAMIN B-12) 1000 MCG sublingual tablet, Place 1,000 mcg under the tongue Daily. One SL daily, Disp: 90 each, Rfl: 3  •  ezetimibe (ZETIA) 10 MG tablet, Take 1 tablet by mouth Daily. for cholesterol, Disp: 90 tablet, Rfl: 3  •  levothyroxine (SYNTHROID, LEVOTHROID) 50 MCG tablet, Take 1 tablet by mouth Daily. For thyroid, Disp: 90 tablet, Rfl: 3  •  losartan (COZAAR) 25 MG tablet, Take 1 tablet by mouth Daily. for blood pressure, Disp: 90 tablet, Rfl: 1  •  metoprolol succinate XL (TOPROL-XL) 25 MG 24 hr tablet, TAKE ONE-HALF (1/2) TO ONE TABLET DAILY FOR BLOOD PRESSURE AND HEART RATE CONTROL, Disp: 90 tablet, Rfl: 1  •  omeprazole (priLOSEC) 40 MG capsule, Take 1 capsule by mouth 2 (Two) Times a Day. For GERD, Disp: 180 capsule, Rfl: 3  •   "risedronate (ACTONEL) 150 MG tablet, Take 150 mg by mouth., Disp: , Rfl:     Current Facility-Administered Medications:   •  denosumab (PROLIA) syringe 60 mg, 60 mg, Subcutaneous, Once, Hortencia Jackman PA-C      Social History     Socioeconomic History   • Marital status:      Spouse name: Not on file   • Number of children: Not on file   • Years of education: Not on file   • Highest education level: Not on file   Tobacco Use   • Smoking status: Never Smoker   • Smokeless tobacco: Never Used   • Tobacco comment: minimal caffeine   Substance and Sexual Activity   • Alcohol use: Yes     Comment: \" once month\"   • Drug use: No   • Sexual activity: Defer         Review of Systems   Constitution: Positive for malaise/fatigue.   HENT: Negative.    Eyes: Negative.    Cardiovascular: Positive for leg swelling.   Respiratory: Negative.    Endocrine: Negative.    Skin: Negative.    Musculoskeletal: Negative.    Gastrointestinal: Negative.    Neurological: Positive for light-headedness.   Psychiatric/Behavioral: Negative.        Procedures      ECG 12 Lead    Date/Time: 2/24/2021 9:16 AM  Performed by: Anirudh Adame MD  Authorized by: Anirudh Adame MD   Comparison: compared with previous ECG   Rhythm: sinus rhythm  Conduction: non-specific intraventricular conduction delay  Other findings: left ventricular hypertrophy                Objective:    /80 (BP Location: Left arm, Patient Position: Sitting, Cuff Size: Adult)   Pulse 65   Ht 165.1 cm (65\")   Wt 118 kg (260 lb 3.2 oz)   LMP  (LMP Unknown)   SpO2 98%   BMI 43.30 kg/m²         Constitutional:       Appearance: Healthy appearance. Overweight.   Cardiovascular:      PMI at left midclavicular line. Normal rate. Regular rhythm. Normal S1. Normal S2.      Murmurs: There is no murmur.      No gallop. No click. No rub.   Pulses:     Intact distal pulses.   Edema:     Peripheral edema absent.             Assessment:       Diagnosis Plan   1. " Paroxysmal atrial fibrillation (CMS/[facility])     2. Essential hypertension     3. Class 3 severe obesity due to excess calories with body mass index (BMI) of 40.0 to 44.9 in adult, unspecified whether serious comorbidity present (CMS/HCC)     4. ERIC (obstructive sleep apnea)         .  Paroxysmal atrial fibrillation.  The patient's episode occurred 3 months ago.  Going to continue her on the present course of medication but if she has additional episodes am going to change her probably to propafenone or flecainide.  She has no known history of coronary disease  2.  Hypertension: Controlled  3.  Morbid obesity: Continues to be an issue  4.  Obstructive sleep apnea: Treated     Plan:

## 2021-04-05 RX ORDER — METOPROLOL SUCCINATE 25 MG/1
TABLET, EXTENDED RELEASE ORAL
Qty: 90 TABLET | Refills: 0 | Status: SHIPPED | OUTPATIENT
Start: 2021-04-05 | End: 2021-04-22 | Stop reason: SDUPTHER

## 2021-04-05 RX ORDER — LOSARTAN POTASSIUM 25 MG/1
TABLET ORAL
Qty: 90 TABLET | Refills: 0 | Status: SHIPPED | OUTPATIENT
Start: 2021-04-05 | End: 2021-04-22

## 2021-04-19 ENCOUNTER — TELEPHONE (OUTPATIENT)
Dept: CARDIOLOGY | Facility: CLINIC | Age: 76
End: 2021-04-19

## 2021-04-22 ENCOUNTER — OFFICE VISIT (OUTPATIENT)
Dept: FAMILY MEDICINE CLINIC | Facility: CLINIC | Age: 76
End: 2021-04-22

## 2021-04-22 VITALS
TEMPERATURE: 97.5 F | WEIGHT: 262 LBS | DIASTOLIC BLOOD PRESSURE: 68 MMHG | BODY MASS INDEX: 43.65 KG/M2 | HEART RATE: 64 BPM | OXYGEN SATURATION: 98 % | RESPIRATION RATE: 16 BRPM | SYSTOLIC BLOOD PRESSURE: 144 MMHG | HEIGHT: 65 IN

## 2021-04-22 DIAGNOSIS — R73.01 IMPAIRED FASTING GLUCOSE: ICD-10-CM

## 2021-04-22 DIAGNOSIS — I10 ESSENTIAL HYPERTENSION: Primary | ICD-10-CM

## 2021-04-22 DIAGNOSIS — E78.2 MIXED HYPERLIPIDEMIA: ICD-10-CM

## 2021-04-22 DIAGNOSIS — K21.9 GASTROESOPHAGEAL REFLUX DISEASE WITHOUT ESOPHAGITIS: ICD-10-CM

## 2021-04-22 DIAGNOSIS — E66.01 CLASS 3 SEVERE OBESITY DUE TO EXCESS CALORIES WITH BODY MASS INDEX (BMI) OF 40.0 TO 44.9 IN ADULT, UNSPECIFIED WHETHER SERIOUS COMORBIDITY PRESENT (HCC): ICD-10-CM

## 2021-04-22 DIAGNOSIS — E61.1 LOW IRON: ICD-10-CM

## 2021-04-22 DIAGNOSIS — M81.0 AGE-RELATED OSTEOPOROSIS WITHOUT CURRENT PATHOLOGICAL FRACTURE: ICD-10-CM

## 2021-04-22 DIAGNOSIS — I48.0 PAROXYSMAL ATRIAL FIBRILLATION (HCC): ICD-10-CM

## 2021-04-22 DIAGNOSIS — G47.33 OSA (OBSTRUCTIVE SLEEP APNEA): ICD-10-CM

## 2021-04-22 PROCEDURE — 99214 OFFICE O/P EST MOD 30 MIN: CPT | Performed by: PHYSICIAN ASSISTANT

## 2021-04-22 RX ORDER — METOPROLOL SUCCINATE 25 MG/1
TABLET, EXTENDED RELEASE ORAL
Qty: 90 TABLET | Refills: 3 | Status: SHIPPED | OUTPATIENT
Start: 2021-04-22 | End: 2022-08-06 | Stop reason: SDUPTHER

## 2021-04-22 RX ORDER — LOSARTAN POTASSIUM 50 MG/1
50 TABLET ORAL DAILY
Qty: 90 TABLET | Refills: 1 | Status: SHIPPED | OUTPATIENT
Start: 2021-04-22 | End: 2021-09-27

## 2021-04-22 NOTE — PROGRESS NOTES
"Subjective   Jessica Ames is a 75 y.o. female.     History of Present Illness      Since the last visit, she has overall felt fairly well.  She has Essential Hypertension not at goal, plan to add/adjust medication, Impaired fasting glucose and will monitor labs to watch for DMII, Hyperlipidemia on Zetia and is effective with lowering lipid values, Atrial Fibillation and remains under the care of their cardiologist for management, Hypothyroidism well controlled on current medication and will continue Rx and Vitamin D deficiency and labs are at goal >30 ng/mL.  she has been compliant with current medications have reviewed them.  The patient denies medication side effects.  Will refill medications. /59 (BP Location: Left arm, Patient Position: Sitting, Cuff Size: Adult)   Pulse 64   Temp 97.5 °F (36.4 °C)   Resp 16   Ht 165.1 cm (65\")   Wt 119 kg (262 lb)   LMP  (LMP Unknown)   SpO2 98%   BMI 43.60 kg/m²   She is on Actonel for osteoporosis  Needs PPI BID    Results for orders placed or performed in visit on 10/12/20   Comprehensive metabolic panel    Specimen: Blood   Result Value Ref Range    Glucose 105 (H) 65 - 99 mg/dL    BUN 21 8 - 27 mg/dL    Creatinine 0.97 0.57 - 1.00 mg/dL    eGFR Non African Am 57 (L) >59 mL/min/1.73    eGFR African Am 66 >59 mL/min/1.73    BUN/Creatinine Ratio 22 12 - 28    Sodium 142 134 - 144 mmol/L    Potassium 4.7 3.5 - 5.2 mmol/L    Chloride 104 96 - 106 mmol/L    Total CO2 24 20 - 29 mmol/L    Calcium 9.6 8.7 - 10.3 mg/dL    Total Protein 6.7 6.0 - 8.5 g/dL    Albumin 4.4 3.7 - 4.7 g/dL    Globulin 2.3 1.5 - 4.5 g/dL    A/G Ratio 1.9 1.2 - 2.2    Total Bilirubin 0.5 0.0 - 1.2 mg/dL    Alkaline Phosphatase 73 39 - 117 IU/L    AST (SGOT) 14 0 - 40 IU/L    ALT (SGPT) 14 0 - 32 IU/L   Lipid panel    Specimen: Blood   Result Value Ref Range    Total Cholesterol 190 100 - 199 mg/dL    Triglycerides 186 (H) 0 - 149 mg/dL    HDL Cholesterol 39 (L) >39 mg/dL    VLDL Cholesterol " Danny 33 5 - 40 mg/dL    LDL Chol Calc ([facility]) 118 (H) 0 - 99 mg/dL   TSH    Specimen: Blood   Result Value Ref Range    TSH 2.950 0.450 - 4.500 uIU/mL   Hemoglobin A1c    Specimen: Blood   Result Value Ref Range    Hemoglobin A1C 6.0 (H) 4.8 - 5.6 %   T3, Free    Specimen: Blood   Result Value Ref Range    T3, Free 2.6 2.0 - 4.4 pg/mL   T4, Free    Specimen: Blood   Result Value Ref Range    Free T4 1.34 0.82 - 1.77 ng/dL   Vitamin D 25 Hydroxy    Specimen: Blood   Result Value Ref Range    25 Hydroxy, Vitamin D 41.0 30.0 - 100.0 ng/mL   Vitamin B12    Specimen: Blood   Result Value Ref Range    Vitamin B-12 584 232 - 1,245 pg/mL   Folate    Specimen: Blood   Result Value Ref Range    Folate 8.5 >3.0 ng/mL   Ferritin    Specimen: Blood   Result Value Ref Range    Ferritin 47 15 - 150 ng/mL   Iron Profile    Specimen: Blood   Result Value Ref Range    TIBC 379 250 - 450 ug/dL    UIBC 306 118 - 369 ug/dL    Iron 73 27 - 139 ug/dL    Iron Saturation 19 15 - 55 %   CBC and Differential    Specimen: Blood   Result Value Ref Range    WBC 5.6 3.4 - 10.8 x10E3/uL    RBC 4.84 3.77 - 5.28 x10E6/uL    Hemoglobin 13.9 11.1 - 15.9 g/dL    Hematocrit 42.7 34.0 - 46.6 %    MCV 88 79 - 97 fL    MCH 28.7 26.6 - 33.0 pg    MCHC 32.6 31.5 - 35.7 g/dL    RDW 14.2 11.7 - 15.4 %    Platelets 145 (L) 150 - 450 x10E3/uL    Neutrophil Rel % 52 Not Estab. %    Lymphocyte Rel % 37 Not Estab. %    Monocyte Rel % 10 Not Estab. %    Eosinophil Rel % 0 Not Estab. %    Basophil Rel % 0 Not Estab. %    Neutrophils Absolute 3.0 1.4 - 7.0 x10E3/uL    Lymphocytes Absolute 2.0 0.7 - 3.1 x10E3/uL    Monocytes Absolute 0.5 0.1 - 0.9 x10E3/uL    Eosinophils Absolute 0.0 0.0 - 0.4 x10E3/uL    Basophils Absolute 0.0 0.0 - 0.2 x10E3/uL    Immature Granulocyte Rel % 1 Not Estab. 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 Bowman  I want to note she saw cardiologist, Dr Wendi Fortune and he notes about her a fib:     .  Paroxysmal atrial fibrillation.  The patient's episode occurred 3 months ago.  Going to continue her on the present course of medication but if she has additional episodes am going to change her probably to propafenone or flecainide.  She has no known history of coronary disease    The following portions of the patient's history were reviewed and updated as appropriate: allergies, current medications, past family history, past medical history, past social history, past surgical history and problem list.    Review of Systems   Constitutional: Positive for fatigue. Negative for fever.   HENT: Negative for nosebleeds and trouble swallowing.    Eyes: Negative for visual disturbance.   Respiratory: Negative for choking and stridor.    Cardiovascular: Negative for chest pain.   Gastrointestinal: Negative for blood in stool.   Endocrine: Negative for polydipsia.   Genitourinary: Negative for genital sores and hematuria.   Musculoskeletal: Negative for joint swelling.   Skin: Negative for color change and rash.   Allergic/Immunologic: Negative for immunocompromised state.   Neurological: Negative for seizures, facial asymmetry and speech difficulty.   Hematological: Negative for adenopathy.   Psychiatric/Behavioral: Negative for behavioral problems, self-injury, sleep disturbance and suicidal ideas. The patient is not nervous/anxious.        Objective   Physical Exam  Vitals and nursing note reviewed.   Constitutional:       General: She is not in acute distress.     Appearance: She is well-developed. She is obese. She is not ill-appearing or toxic-appearing.   HENT:      Head: Normocephalic.      Right Ear: External ear normal.      Left Ear: External ear normal.      Nose: Nose normal.      Mouth/Throat:      Pharynx: Oropharynx is clear.   Eyes:      General:  No scleral icterus.     Conjunctiva/sclera: Conjunctivae normal.      Pupils: Pupils are equal, round, and reactive to light.   Neck:      Thyroid: No thyromegaly.      Vascular: No carotid bruit.   Cardiovascular:      Rate and Rhythm: Normal rate and regular rhythm.      Pulses: Normal pulses.      Heart sounds: Normal heart sounds. No murmur heard.     Pulmonary:      Effort: Pulmonary effort is normal. No respiratory distress.      Breath sounds: Normal breath sounds.   Musculoskeletal:         General: No deformity. Normal range of motion.      Cervical back: Normal range of motion and neck supple.      Right lower leg: Edema present.      Left lower leg: Edema present.   Skin:     General: Skin is warm and dry.      Coloration: Skin is not jaundiced.      Findings: No rash.   Neurological:      General: No focal deficit present.      Mental Status: She is alert and oriented to person, place, and time. Mental status is at baseline.   Psychiatric:         Mood and Affect: Mood normal.         Behavior: Behavior normal.         Thought Content: Thought content normal.         Judgment: Judgment normal.         Assessment/Plan   Diagnoses and all orders for this visit:    1. Essential hypertension (Primary)    2. ERIC (obstructive sleep apnea)    3. Mixed hyperlipidemia    4. Impaired fasting glucose    5. Class 3 severe obesity due to excess calories with body mass index (BMI) of 40.0 to 44.9 in adult, unspecified whether serious comorbidity present (CMS/[facility])    6. Age-related osteoporosis without current pathological fracture    7. Paroxysmal atrial fibrillation (CMS/[facility])    8. Gastroesophageal reflux disease without esophagitis    Other orders  -     losartan (Cozaar) 50 MG tablet; Take 1 tablet by mouth Daily. New dose for BP  Dispense: 90 tablet; Refill: 1  -     metoprolol succinate XL (TOPROL-XL) 25 MG 24 hr tablet; 1 PO once daily for heart  Dispense: 90 tablet; Refill: 3        Plan, Jessica Ames, was seen  today.  she was seen for HTN and add/adjust medication, Imparied fasting glucose and plan follow up labs, diet, and exercise, Hyperlipidemia and will continue current medication, Hypothyroidism well controlled, continue medication and Vitamin D deficiency and supplemented.  Keep Actonel for osteoporosis  Use Cpap/Bipap every night  Labs and I watch her iron levels  Send message cardio about A fib Sun.  He notes on last visit about adding Rx  Raise dose Losartan---has cardio appt 5-12-----make sure bp at goal         Answers for HPI/ROS submitted by the patient on 4/15/2021  Please describe your symptoms.: No new symptoms, 6 month follow-up.  Have you had these symptoms before?: Yes  How long have you been having these symptoms?: Greater than 2 weeks  What is the primary reason for your visit?: Other

## 2021-04-23 LAB
ALBUMIN SERPL-MCNC: 4.6 G/DL (ref 3.7–4.7)
ALBUMIN/GLOB SERPL: 1.9 {RATIO} (ref 1.2–2.2)
ALP SERPL-CCNC: 77 IU/L (ref 39–117)
ALT SERPL-CCNC: 16 IU/L (ref 0–32)
AST SERPL-CCNC: 19 IU/L (ref 0–40)
BASOPHILS # BLD AUTO: 0 X10E3/UL (ref 0–0.2)
BASOPHILS NFR BLD AUTO: 0 %
BILIRUB SERPL-MCNC: 0.6 MG/DL (ref 0–1.2)
BUN SERPL-MCNC: 15 MG/DL (ref 8–27)
BUN/CREAT SERPL: 14 (ref 12–28)
CALCIUM SERPL-MCNC: 9.6 MG/DL (ref 8.7–10.3)
CHLORIDE SERPL-SCNC: 103 MMOL/L (ref 96–106)
CHOLEST SERPL-MCNC: 212 MG/DL (ref 100–199)
CO2 SERPL-SCNC: 25 MMOL/L (ref 20–29)
CREAT SERPL-MCNC: 1.07 MG/DL (ref 0.57–1)
EOSINOPHIL # BLD AUTO: 0 X10E3/UL (ref 0–0.4)
EOSINOPHIL NFR BLD AUTO: 0 %
ERYTHROCYTE [DISTWIDTH] IN BLOOD BY AUTOMATED COUNT: 14.2 % (ref 11.7–15.4)
FERRITIN SERPL-MCNC: 52 NG/ML (ref 15–150)
GLOBULIN SER CALC-MCNC: 2.4 G/DL (ref 1.5–4.5)
GLUCOSE SERPL-MCNC: 114 MG/DL (ref 65–99)
HBA1C MFR BLD: 6.3 % (ref 4.8–5.6)
HCT VFR BLD AUTO: 43.8 % (ref 34–46.6)
HDLC SERPL-MCNC: 43 MG/DL
HGB BLD-MCNC: 14.2 G/DL (ref 11.1–15.9)
IMM GRANULOCYTES # BLD AUTO: 0 X10E3/UL (ref 0–0.1)
IMM GRANULOCYTES NFR BLD AUTO: 1 %
IRON SATN MFR SERPL: 24 % (ref 15–55)
IRON SERPL-MCNC: 92 UG/DL (ref 27–139)
LDLC SERPL CALC-MCNC: 131 MG/DL (ref 0–99)
LYMPHOCYTES # BLD AUTO: 2.2 X10E3/UL (ref 0.7–3.1)
LYMPHOCYTES NFR BLD AUTO: 37 %
MCH RBC QN AUTO: 28.2 PG (ref 26.6–33)
MCHC RBC AUTO-ENTMCNC: 32.4 G/DL (ref 31.5–35.7)
MCV RBC AUTO: 87 FL (ref 79–97)
MONOCYTES # BLD AUTO: 0.5 X10E3/UL (ref 0.1–0.9)
MONOCYTES NFR BLD AUTO: 9 %
NEUTROPHILS # BLD AUTO: 3.2 X10E3/UL (ref 1.4–7)
NEUTROPHILS NFR BLD AUTO: 53 %
PLATELET # BLD AUTO: 161 X10E3/UL (ref 150–450)
POTASSIUM SERPL-SCNC: 4.2 MMOL/L (ref 3.5–5.2)
PROT SERPL-MCNC: 7 G/DL (ref 6–8.5)
RBC # BLD AUTO: 5.03 X10E6/UL (ref 3.77–5.28)
SODIUM SERPL-SCNC: 143 MMOL/L (ref 134–144)
TIBC SERPL-MCNC: 391 UG/DL (ref 250–450)
TRIGL SERPL-MCNC: 211 MG/DL (ref 0–149)
UIBC SERPL-MCNC: 299 UG/DL (ref 118–369)
VLDLC SERPL CALC-MCNC: 38 MG/DL (ref 5–40)
WBC # BLD AUTO: 6 X10E3/UL (ref 3.4–10.8)

## 2021-06-11 NOTE — PROGRESS NOTES
New right Shoulder      Patient: Jessica Ames        YOB: 1945    Medical Record Number: [id number]        Chief Complaints: Right shoulder pain      History of Present Illness: This is a 75-year-old female who presents complaining of right shoulder pain is been ongoing for a few weeks no history injury change in activity she has had rotator cuff repairs bilaterally the right was in 2011 the left was in 2004 no no history injury change in activity symptoms are moderate intermittent aching worse with activity somewhat better with rest she does have some night pain past medical history is marked for heart disease high blood pressure thyroid disease back problems reflux and history of DVT      Allergies:   Allergies   Allergen Reactions   • Adenosine Other (See Comments)     Paralyzed lungs and vocal chords   • Lisinopril Cough   • Celecoxib Palpitations     LE EDEMA   • Naproxen Palpitations     LE EDEMA   • Risedronate Palpitations   • Risedronate Sodium Palpitations     LE EDEMA   • Sulfa Antibiotics Hives       Medications:   Home Medications:  Current Outpatient Medications on File Prior to Visit   Medication Sig   • aspirin 81 MG EC tablet Take 81 mg by mouth Daily.   • Cholecalciferol 2000 units capsule Take 2,000 Units by mouth Daily. One PO daily   • Cyanocobalamin (VITAMIN B-12) 1000 MCG sublingual tablet Place 1,000 mcg under the tongue Daily. One SL daily   • ezetimibe (ZETIA) 10 MG tablet Take 1 tablet by mouth Daily. for cholesterol   • levothyroxine (SYNTHROID, LEVOTHROID) 50 MCG tablet Take 1 tablet by mouth Daily. For thyroid   • losartan (Cozaar) 50 MG tablet Take 1 tablet by mouth Daily. New dose for BP   • metoprolol succinate XL (TOPROL-XL) 25 MG 24 hr tablet 1 PO once daily for heart   • omeprazole (priLOSEC) 40 MG capsule Take 1 capsule by mouth 2 (Two) Times a Day. For GERD   • risedronate (ACTONEL) 150 MG tablet Take 150 mg by mouth.     No current facility-administered  "medications on file prior to visit.     Current Medications:  Scheduled Meds:  Continuous Infusions:No current facility-administered medications for this visit.    PRN Meds:.    Past Medical History:   Diagnosis Date   • Disease of thyroid gland    • DVT (deep venous thrombosis) (CMS/[facility]) 2013    right leg; s/p knee replacement; was on xarelto and now baby aspirin; followed by Dr. Parminder Mejia     • Factor V Leiden (CMS/[facility])    • First degree AV block 12/7/2012   • GERD (gastroesophageal reflux disease) 12/7/2012   • Hiatal hernia    • Hyperlipidemia    • Hypertension    • IFG (impaired fasting glucose)    • Iron deficiency anemia     sees Dr. Clem Bowman    • Left anterior fascicular block 12/7/2012   • Lower extremity edema    • Obesity 12/7/2012   • ERIC (obstructive sleep apnea)     compliant with CPAP machine    • PAF (paroxysmal atrial fibrillation) (CMS/[facility]) 11/2015    day after colonoscopy went into atrial fibrillation; was on Xarelto and now baby aspirin    • Pulmonary embolism (CMS/[facility]) 2013    also had DVT    • PVC's (premature ventricular contractions) 12/7/2012   • Venous insufficiency     followed by Dr. Parminder Mejia         Past Surgical History:   Procedure Laterality Date   • BREAST SURGERY      reduction   • CATH LAB PROCEDURE     • HAND SURGERY     • HYSTERECTOMY     • ROTATOR CUFF REPAIR     • SHOULDER SURGERY     • TOTAL KNEE ARTHROPLASTY          Social History     Occupational History   • Not on file   Tobacco Use   • Smoking status: Never Smoker   • Smokeless tobacco: Never Used   • Tobacco comment: minimal caffeine   Vaping Use   • Vaping Use: Never used   Substance and Sexual Activity   • Alcohol use: Yes     Comment: \" once month\"   • Drug use: No   • Sexual activity: Defer      Social History     Social History Narrative   • Not on file        Family History   Problem Relation Age of Onset   • Stroke Mother    • Diabetes Mother    • Hypertension Mother    • Uterine cancer Mother    • " "Hypertension Father    • Heart attack Father    • Emphysema Father    • Diabetes Sister    • Hypertension Sister    • Stroke Brother    • Diabetes Brother    • Hypertension Brother              Review of Systems: 14 point review of systems are remarkable for pertinent positives listed in the chart by the patient remainder negative    Review of Systems      Physical Exam: 75 y.o. female  General Appearance:    Alert, cooperative, in no acute distress                   Vitals:    06/15/21 1353   Temp: 98.2 °F (36.8 °C)   Weight: 120 kg (263 lb 12.8 oz)   Height: 165.1 cm (65\")   PainSc:   5      Patient is alert and read ×3 no acute distress appears her above-listed at height weight and age.  Affect is normal respiratory rate is normal unlabored. Heart rate regular rate rhythm, sclera, dentition and hearing are normal for the purpose of this exam.    Ortho Exam Physical exam of the right shoulder reveals no overlying skin changes no lymphedema no lymphadenopathy.  Patient has active flexion 180 with mild symptoms abduction is similar external rotation is to 50 and internal rotation to the upper lumbar spine with mild symptoms.  Patient has good rotator cuff strength 4+ over 5 with isometric strength testing with pain.  Patient has a positive impingement and a positive Pham sign.  Patient has good cervical range of motion which is full and asymptomatic no radicular symptoms.  Patient has a normal elbow exam.  Good distal pulses are present  Patient has pain with overhead activity and a positive Neer sign and a positive empty can sign , a positive drop arm and a definitive painful arc    Large Joint Arthrocentesis: R subacromial bursa  Date/Time: 6/15/2021 2:17 PM  Consent given by: patient  Site marked: site marked  Timeout: Immediately prior to procedure a time out was called to verify the correct patient, procedure, equipment, support staff and site/side marked as required   Supporting Documentation  Indications: " pain   Procedure Details  Location: shoulder - R subacromial bursa  Preparation: Patient was prepped and draped in the usual sterile fashion  Needle size: 22 G  Approach: posterior  Medications administered: 80 mg methylPREDNISolone acetate 80 MG/ML; 4 mL lidocaine PF 2% 2 %  Patient tolerance: patient tolerated the procedure well with no immediate complications                Radiology:   AP, Scapular Y and Axillary Lateral of the right shoulder were ordered/reviewed to evauate shoulder pain.  I have no comparative films she has some sclerosis at the insertion of the cuff at the tuberosity otherwise normal  Imaging Results (Most Recent)     Procedure Component Value Units Date/Time    XR Shoulder 2+ View Right [[id number]] Resulted: 06/15/21 1343     Updated: 06/15/21 1343    Impression:      Ordering physician's impression is located in the Encounter Note dated 06/15/21. X-ray performed in the DR room.          Assessment/Plan: Right shoulder pain I suspect this is rotator cuff in some fashion plan is to proceed with an injection as a diagnostic and therapeutic tool she fails to improve we will pursue other means of testing she does have a complicated past medical history which increases her risk with the injection  Cortisone Injection. See procedure note.  Cortisone Injection for DIAGNOSTIC and THERAPUTIC purposes.

## 2021-06-15 ENCOUNTER — OFFICE VISIT (OUTPATIENT)
Dept: ORTHOPEDIC SURGERY | Facility: CLINIC | Age: 76
End: 2021-06-15

## 2021-06-15 VITALS — TEMPERATURE: 98.2 F | BODY MASS INDEX: 43.95 KG/M2 | HEIGHT: 65 IN | WEIGHT: 263.8 LBS

## 2021-06-15 DIAGNOSIS — M75.41 IMPINGEMENT SYNDROME OF RIGHT SHOULDER: ICD-10-CM

## 2021-06-15 DIAGNOSIS — R52 PAIN: Primary | ICD-10-CM

## 2021-06-15 PROCEDURE — 73030 X-RAY EXAM OF SHOULDER: CPT | Performed by: ORTHOPAEDIC SURGERY

## 2021-06-15 PROCEDURE — 99203 OFFICE O/P NEW LOW 30 MIN: CPT | Performed by: ORTHOPAEDIC SURGERY

## 2021-06-15 PROCEDURE — 20610 DRAIN/INJ JOINT/BURSA W/O US: CPT | Performed by: ORTHOPAEDIC SURGERY

## 2021-06-15 RX ADMIN — LIDOCAINE HYDROCHLORIDE 4 ML: 20 INJECTION, SOLUTION EPIDURAL; INFILTRATION; INTRACAUDAL; PERINEURAL at 14:17

## 2021-06-15 RX ADMIN — METHYLPREDNISOLONE ACETATE 80 MG: 80 INJECTION, SUSPENSION INTRA-ARTICULAR; INTRALESIONAL; INTRAMUSCULAR; SOFT TISSUE at 14:17

## 2021-06-21 RX ORDER — METHYLPREDNISOLONE ACETATE 80 MG/ML
80 INJECTION, SUSPENSION INTRA-ARTICULAR; INTRALESIONAL; INTRAMUSCULAR; SOFT TISSUE
Status: COMPLETED | OUTPATIENT
Start: 2021-06-15 | End: 2021-06-15

## 2021-06-21 RX ORDER — LIDOCAINE HYDROCHLORIDE 20 MG/ML
4 INJECTION, SOLUTION EPIDURAL; INFILTRATION; INTRACAUDAL; PERINEURAL
Status: COMPLETED | OUTPATIENT
Start: 2021-06-15 | End: 2021-06-15

## 2021-06-28 ENCOUNTER — TELEPHONE (OUTPATIENT)
Dept: ORTHOPEDICS | Facility: OTHER | Age: 76
End: 2021-06-28

## 2021-06-28 DIAGNOSIS — M25.511 CHRONIC RIGHT SHOULDER PAIN: ICD-10-CM

## 2021-06-28 DIAGNOSIS — M75.101 TEAR OF RIGHT ROTATOR CUFF, UNSPECIFIED TEAR EXTENT, UNSPECIFIED WHETHER TRAUMATIC: Primary | ICD-10-CM

## 2021-06-28 DIAGNOSIS — G89.29 CHRONIC RIGHT SHOULDER PAIN: ICD-10-CM

## 2021-06-28 NOTE — TELEPHONE ENCOUNTER
Provider: YAMEL    Caller: PATIENT    Relationship to Patient: SELF    Phone Number: [phone]    Reason for Call: PATIENT SAYS SHE'S NOT FEELING BETTER FROM HER 06/15/2021 VISIT FROM [facility], SHE EXPRESSED THAT [facility]LES SAID IF THE INJECTION DON'T WORK SHE WILL PUT IN A ORDER FOR A  MRI.    PATIENT WOULD LIKE A PHONE CALL AS SOON AS THE ORDER IS PLACED    PLEASE ADVISE

## 2021-06-30 NOTE — TELEPHONE ENCOUNTER
Patient calling again to check on this.  It was routed to dr. Mejia who is out of the office.  I will re-route to Dr. Mejia's nurse prac.  Patient is aware that she is out of office until tomorrow.

## 2021-07-06 ENCOUNTER — TELEPHONE (OUTPATIENT)
Dept: FAMILY MEDICINE CLINIC | Facility: CLINIC | Age: 76
End: 2021-07-06

## 2021-07-06 NOTE — TELEPHONE ENCOUNTER
Caller: [facility] - [facility] [address] [phone] [facility] [phone]     Relationship: Pharmacy    Best call back number: [phone] ref# [id number]    What medications are you currently taking:   Current Outpatient Medications on File Prior to Visit   Medication Sig Dispense Refill   • aspirin 81 MG EC tablet Take 81 mg by mouth Daily.     • Cholecalciferol 2000 units capsule Take 2,000 Units by mouth Daily. One PO daily 90 each 3   • Cyanocobalamin (VITAMIN B-12) 1000 MCG sublingual tablet Place 1,000 mcg under the tongue Daily. One SL daily 90 each 3   • ezetimibe (ZETIA) 10 MG tablet Take 1 tablet by mouth Daily. for cholesterol 90 tablet 3   • levothyroxine (SYNTHROID, LEVOTHROID) 50 MCG tablet Take 1 tablet by mouth Daily. For thyroid 90 tablet 3   • losartan (Cozaar) 50 MG tablet Take 1 tablet by mouth Daily. New dose for BP 90 tablet 1   • metoprolol succinate XL (TOPROL-XL) 25 MG 24 hr tablet 1 PO once daily for heart 90 tablet 3   • omeprazole (priLOSEC) 40 MG capsule Take 1 capsule by mouth 2 (Two) Times a Day. For GERD 180 capsule 3   • risedronate (ACTONEL) 150 MG tablet Take 150 mg by mouth.       No current facility-administered medications on file prior to visit.        Which medication are you concerned about: risedronate (ACTONEL) 150 MG tablet    Who prescribed you this medication: What are your concerns: IT WAS REPORTED SEVERAL MONTHS AGO THAT THIS PATIENT HAD AN ALLERGIC REACTION TO THIS MEDICATION IF SO PLEASE ADVISE [facility] NOT TO FILL AND SEND A NEW PRESCRIPTION.

## 2021-07-20 ENCOUNTER — HOSPITAL ENCOUNTER (OUTPATIENT)
Dept: MRI IMAGING | Facility: HOSPITAL | Age: 76
Discharge: HOME | End: 2021-07-20
Admitting: NURSE PRACTITIONER

## 2021-07-20 DIAGNOSIS — G89.29 CHRONIC RIGHT SHOULDER PAIN: ICD-10-CM

## 2021-07-20 DIAGNOSIS — M25.511 CHRONIC RIGHT SHOULDER PAIN: ICD-10-CM

## 2021-07-20 DIAGNOSIS — M75.101 TEAR OF RIGHT ROTATOR CUFF, UNSPECIFIED TEAR EXTENT, UNSPECIFIED WHETHER TRAUMATIC: ICD-10-CM

## 2021-07-20 PROCEDURE — 73221 MRI JOINT UPR EXTREM W/O DYE: CPT

## 2021-07-22 ENCOUNTER — HOSPITAL ENCOUNTER (OUTPATIENT)
Dept: MRI IMAGING | Facility: HOSPITAL | Age: 76
End: 2021-07-22

## 2021-07-26 ENCOUNTER — OFFICE VISIT (OUTPATIENT)
Dept: ORTHOPEDIC SURGERY | Facility: CLINIC | Age: 76
End: 2021-07-26

## 2021-07-26 VITALS — WEIGHT: 260 LBS | TEMPERATURE: 98.6 F | BODY MASS INDEX: 43.32 KG/M2 | HEIGHT: 65 IN

## 2021-07-26 DIAGNOSIS — G89.29 OTHER CHRONIC PAIN: Primary | ICD-10-CM

## 2021-07-26 DIAGNOSIS — M19.011 PRIMARY LOCALIZED OSTEOARTHROSIS OF RIGHT SHOULDER REGION: Primary | ICD-10-CM

## 2021-07-26 DIAGNOSIS — M75.41 IMPINGEMENT SYNDROME OF RIGHT SHOULDER: ICD-10-CM

## 2021-07-26 PROCEDURE — 99213 OFFICE O/P EST LOW 20 MIN: CPT | Performed by: ORTHOPAEDIC SURGERY

## 2021-07-26 PROCEDURE — 20610 DRAIN/INJ JOINT/BURSA W/O US: CPT | Performed by: ORTHOPAEDIC SURGERY

## 2021-07-26 RX ORDER — LIDOCAINE HYDROCHLORIDE 10 MG/ML
4 INJECTION, SOLUTION EPIDURAL; INFILTRATION; INTRACAUDAL; PERINEURAL
Status: COMPLETED | OUTPATIENT
Start: 2021-07-26 | End: 2021-07-26

## 2021-07-26 RX ORDER — METHYLPREDNISOLONE ACETATE 80 MG/ML
80 INJECTION, SUSPENSION INTRA-ARTICULAR; INTRALESIONAL; INTRAMUSCULAR; SOFT TISSUE
Status: COMPLETED | OUTPATIENT
Start: 2021-07-26 | End: 2021-07-26

## 2021-07-26 RX ADMIN — METHYLPREDNISOLONE ACETATE 80 MG: 80 INJECTION, SUSPENSION INTRA-ARTICULAR; INTRALESIONAL; INTRAMUSCULAR; SOFT TISSUE at 15:32

## 2021-07-26 RX ADMIN — LIDOCAINE HYDROCHLORIDE 4 ML: 10 INJECTION, SOLUTION EPIDURAL; INFILTRATION; INTRACAUDAL; PERINEURAL at 15:32

## 2021-07-26 NOTE — PROGRESS NOTES
Right Shoulder MRI Follow Up      Patient: Jessica Ames        YOB: 1945            Chief Complaints:Right  Shoulder pain      History of Present Illness: The patient is here follow-up of an MRI of the shoulder+ demonstrate a thin rotator cuff but intact she also does have some full-thickness chondral loss on the humeral head symptoms are the same she got very temporary relief minimally from subacromial injection      Physical Exam: 75 y.o. female  General Appearance:    Alert, cooperative, in no acute distress                 There were no vitals filed for this visit.     Patient is alert and read ×3 no acute distress appears her above-listed at height weight and age.  Affect is normal respiratory rate is normal unlabored. Heart rate regular rate rhythm, sclera, dentition and hearing are normal for the purpose of this exam.      Ortho Exam  Physical exam of the right shoulder reveals no overlying skin changes no lymphedema no lymphadenopathy.  Patient has active flexion 180 with mild symptoms abduction is similar external rotation is to 50 and internal rotation to the upper lumbar spine with mild symptoms.  Patient has good rotator cuff strength 4+ over 5 with isometric strength testing with pain.  Patient has a positive impingement and a positive Pham sign.  Patient has good cervical range of motion which is full and asymptomatic no radicular symptoms.  Patient has a normal elbow exam.  Good distal pulses are present  Patient has pain with overhead activity and a positive Neer sign and a positive empty can sign , a positive drop arm and a definitive painful arc    MRI Results: MRIs as above have reviewed the films myself and agree with the findings  Large Joint Arthrocentesis: R glenohumeral  Date/Time: 7/26/2021 3:32 PM  Consent given by: patient  Site marked: site marked  Timeout: Immediately prior to procedure a time out was called to verify the correct patient, procedure, equipment, support  staff and site/side marked as required   Supporting Documentation  Indications: pain   Procedure Details  Location: shoulder - R glenohumeral  Preparation: Patient was prepped and draped in the usual sterile fashion  Needle size: 22 G  Approach: posterior  Medications administered: 80 mg methylPREDNISolone acetate 80 MG/ML; 4 mL lidocaine PF 1% 1 %  Patient tolerance: patient tolerated the procedure well with no immediate complications            Assessment/Plan:      Right shoulder pain I still think some of this is from the cuff some of this might be from the glenohumeral joint her cuff is thin but intact we talked about options I think a glenohumeral joint injection to address the chondral loss would be reasonable she does not get relief from that we could consider arthroscopy but she and I both would like to exhaust all of her conservative measures first.  Would like her to see physical therapy after doing this as well

## 2021-08-05 ENCOUNTER — TELEPHONE (OUTPATIENT)
Dept: FAMILY MEDICINE CLINIC | Facility: CLINIC | Age: 76
End: 2021-08-05

## 2021-08-05 DIAGNOSIS — Z12.31 ENCOUNTER FOR SCREENING MAMMOGRAM FOR BREAST CANCER: Primary | ICD-10-CM

## 2021-08-05 NOTE — TELEPHONE ENCOUNTER
Caller: Jessica Ames    Relationship: Self    Best call back number:[phone]    What orders are you requesting (i.e. lab or imaging): MAMMOGRAM    In what timeframe would the patient need to come in: ASAP     Where will you receive your lab/imaging services: [facility]     Additional notes: PATIENT IS DUE FOR ANNUAL

## 2021-08-09 ENCOUNTER — TELEPHONE (OUTPATIENT)
Dept: CARDIOLOGY | Facility: CLINIC | Age: 76
End: 2021-08-09

## 2021-08-09 DIAGNOSIS — I48.0 PAROXYSMAL ATRIAL FIBRILLATION (HCC): Primary | ICD-10-CM

## 2021-08-09 NOTE — TELEPHONE ENCOUNTER
Ms. Ames states that she has had 2 afib incidents in the last 4 months.  She was awakened by her Apple Watch showing her heart rate was around 40 for 10 minutes at a time.      She has an appointment on 9/8.  However she wanted to know if you think she should have it sooner?    Please advise

## 2021-08-09 NOTE — TELEPHONE ENCOUNTER
Advised patient that someone will be giving  her a call to get the Zio patch put on.  She verbalized understanding.

## 2021-08-10 DIAGNOSIS — I48.0 PAROXYSMAL ATRIAL FIBRILLATION (HCC): Primary | ICD-10-CM

## 2021-08-23 ENCOUNTER — TELEPHONE (OUTPATIENT)
Dept: CARDIOLOGY | Facility: CLINIC | Age: 76
End: 2021-08-23

## 2021-08-23 NOTE — TELEPHONE ENCOUNTER
Ms. Ames called stating she had the 48 hour monitor and she states she didn't have any episodes.    She wanted to know if you wanted her to repeat the monitor as she has been having some shortness of breath.      Her Apple watch showed her heart rate being rapid several times between 170 to 200.  She wasn't sure if the monitor caught that.    Please advise

## 2021-09-08 ENCOUNTER — OFFICE VISIT (OUTPATIENT)
Dept: CARDIOLOGY | Facility: CLINIC | Age: 76
End: 2021-09-08

## 2021-09-08 VITALS
DIASTOLIC BLOOD PRESSURE: 98 MMHG | HEART RATE: 62 BPM | WEIGHT: 263.2 LBS | SYSTOLIC BLOOD PRESSURE: 140 MMHG | HEIGHT: 65 IN | BODY MASS INDEX: 43.85 KG/M2

## 2021-09-08 DIAGNOSIS — I10 ESSENTIAL HYPERTENSION: ICD-10-CM

## 2021-09-08 DIAGNOSIS — F41.9 ANXIETY: ICD-10-CM

## 2021-09-08 DIAGNOSIS — I48.0 PAROXYSMAL ATRIAL FIBRILLATION (HCC): Primary | ICD-10-CM

## 2021-09-08 PROCEDURE — 93000 ELECTROCARDIOGRAM COMPLETE: CPT | Performed by: INTERNAL MEDICINE

## 2021-09-08 PROCEDURE — 99214 OFFICE O/P EST MOD 30 MIN: CPT | Performed by: INTERNAL MEDICINE

## 2021-09-08 RX ORDER — ALPRAZOLAM 0.25 MG/1
0.25 TABLET ORAL 2 TIMES DAILY PRN
Qty: 10 TABLET | Refills: 0 | Status: SHIPPED | OUTPATIENT
Start: 2021-09-08 | End: 2021-11-16

## 2021-09-08 NOTE — PROGRESS NOTES
OFFICE VISIT      Date of Office Visit: 2021    Patient Name: Jessica Ames  : 1945    Encounter Provider: Anirudh Adame MD  Referring Provider: No ref. provider found  Primary Care Provider: Hortencia Jackman PA-C  Place of Service: [facility] CARDIOLOGY        Chief Complaint   Patient presents with   • PAF     6 month f/u     History of Present Illness    The patient is a 76-year-old white female with hypertension as well as paroxysmal atrial fibrillation who returns to the office today for follow-up.  Her blood pressure is a little more elevated today.  She received some disturbing news this morning about her brother who passed away from COVID-19.  Otherwise she states that she is feeling well without any major complaints.  Occasionally she will notice some palpitations often lasting up to 3-1/2 hours.  I still suspect she may be having episodes of atrial fibrillation but they are not very frequent.  Other than palpitation she does have fatigue issues.    Past Medical History:   Diagnosis Date   • Disease of thyroid gland    • DVT (deep venous thrombosis) (CMS/[facility])     right leg; s/p knee replacement; was on xarelto and now baby aspirin; followed by Dr. Parminder Mejia     • Factor V Leiden (CMS/[facility])    • First degree AV block 2012   • GERD (gastroesophageal reflux disease) 2012   • Hiatal hernia    • Hyperlipidemia    • Hypertension    • IFG (impaired fasting glucose)    • Iron deficiency anemia     sees Dr. Clem Bowman    • Left anterior fascicular block 2012   • Lower extremity edema    • Obesity 2012   • ERIC (obstructive sleep apnea)     compliant with CPAP machine    • PAF (paroxysmal atrial fibrillation) (CMS/[facility]) 2015    day after colonoscopy went into atrial fibrillation; was on Xarelto and now baby aspirin    • Pulmonary embolism (CMS/[facility])     also had DVT    • PVC's (premature ventricular contractions) 2012   •  "Venous insufficiency     followed by Dr. Parminder Mejia          Past Surgical History:   Procedure Laterality Date   • BREAST SURGERY      reduction   • CATH LAB PROCEDURE     • HAND SURGERY     • HYSTERECTOMY     • ROTATOR CUFF REPAIR     • SHOULDER SURGERY     • TOTAL KNEE ARTHROPLASTY             Current Outpatient Medications:   •  aspirin 81 MG EC tablet, Take 81 mg by mouth Daily., Disp: , Rfl:   •  Cholecalciferol 2000 units capsule, Take 2,000 Units by mouth Daily. One PO daily, Disp: 90 each, Rfl: 3  •  Cyanocobalamin (VITAMIN B-12) 1000 MCG sublingual tablet, Place 1,000 mcg under the tongue Daily. One SL daily, Disp: 90 each, Rfl: 3  •  ezetimibe (ZETIA) 10 MG tablet, Take 1 tablet by mouth Daily. for cholesterol, Disp: 90 tablet, Rfl: 3  •  levothyroxine (SYNTHROID, LEVOTHROID) 50 MCG tablet, Take 1 tablet by mouth Daily. For thyroid, Disp: 90 tablet, Rfl: 3  •  losartan (Cozaar) 50 MG tablet, Take 1 tablet by mouth Daily. New dose for BP, Disp: 90 tablet, Rfl: 1  •  metoprolol succinate XL (TOPROL-XL) 25 MG 24 hr tablet, 1 PO once daily for heart, Disp: 90 tablet, Rfl: 3  •  omeprazole (priLOSEC) 40 MG capsule, Take 1 capsule by mouth 2 (Two) Times a Day. For GERD, Disp: 180 capsule, Rfl: 3  •  risedronate (ACTONEL) 150 MG tablet, Take 150 mg by mouth Every 30 (Thirty) Days., Disp: , Rfl:   •  ALPRAZolam (Xanax) 0.25 MG tablet, Take 1 tablet by mouth 2 (Two) Times a Day As Needed for Anxiety., Disp: 10 tablet, Rfl: 0      Social History     Socioeconomic History   • Marital status:      Spouse name: Not on file   • Number of children: Not on file   • Years of education: Not on file   • Highest education level: Not on file   Tobacco Use   • Smoking status: Never Smoker   • Smokeless tobacco: Never Used   • Tobacco comment: minimal caffeine   Vaping Use   • Vaping Use: Never used   Substance and Sexual Activity   • Alcohol use: Yes     Comment: \" once month\"   • Drug use: No   • Sexual activity: " "Defer         Review of Systems   Constitutional: Positive for malaise/fatigue.   HENT: Negative.    Eyes: Negative.    Cardiovascular: Positive for palpitations.   Respiratory: Negative.    Endocrine: Negative.    Skin: Negative.    Musculoskeletal: Negative.    Gastrointestinal: Negative.    Neurological: Negative.    Psychiatric/Behavioral: Negative.        Procedures      ECG 12 Lead    Date/Time: 9/8/2021 11:17 AM  Performed by: Anirudh Adame MD  Authorized by: Anirudh Adame MD   Comparison: compared with previous ECG from 2/24/2021  Rhythm: sinus rhythm  Rate: normal  Conduction: conduction normal  Conduction: left anterior fascicular block  QRS axis: normal  Other findings: left ventricular hypertrophy with strain                Objective:    /98 (BP Location: Left arm, Patient Position: Sitting)   Pulse 62   Ht 165.1 cm (65\")   Wt 119 kg (263 lb 3.2 oz)   LMP  (LMP Unknown)   BMI 43.80 kg/m²         Vitals reviewed.   Constitutional:       Appearance: Healthy appearance. Well-developed and not in distress. Morbidly obese.   HENT:      Head: Normocephalic.   Neck:      Thyroid: No thyromegaly.      Vascular: No carotid bruit or JVD.   Pulmonary:      Effort: Pulmonary effort is normal.      Breath sounds: Normal breath sounds.   Cardiovascular:      Normal rate. Regular rhythm. Normal S1. Normal S2.      Murmurs: There is no murmur.      No gallop.   Pulses:     Intact distal pulses.   Edema:     Peripheral edema absent.   Musculoskeletal:      Cervical back: Normal range of motion. Skin:     General: Skin is warm and dry.      Findings: No erythema.   Neurological:      Mental Status: Alert and oriented to person, place, and time.             Assessment:       Diagnosis Plan   1. Paroxysmal atrial fibrillation (CMS/HCC)     2. Essential hypertension     3. Anxiety  ALPRAZolam (Xanax) 0.25 MG tablet     1.  Paroxysmal atrial fibrillation: Her episodes are infrequent.  I will continue " to monitor this she may need some anticoagulation or different antiarrhythmic drug going forward.  We will continue to monitor.  She will call me if her episodes increase  2.  Hypertension: Mildly elevated today.  I believe this is secondary to anxiety  3.  Anxiety: We will plan on giving her just 10 Xanax to help her for the next few days       Plan:

## 2021-09-13 RX ORDER — EZETIMIBE 10 MG/1
TABLET ORAL
Qty: 90 TABLET | Refills: 3 | Status: SHIPPED | OUTPATIENT
Start: 2021-09-13 | End: 2022-08-11 | Stop reason: SDUPTHER

## 2021-09-13 RX ORDER — LEVOTHYROXINE SODIUM 0.05 MG/1
TABLET ORAL
Qty: 90 TABLET | Refills: 0 | Status: SHIPPED | OUTPATIENT
Start: 2021-09-13 | End: 2021-12-22

## 2021-09-13 RX ORDER — OMEPRAZOLE 40 MG/1
CAPSULE, DELAYED RELEASE ORAL
Qty: 180 CAPSULE | Refills: 0 | Status: SHIPPED | OUTPATIENT
Start: 2021-09-13 | End: 2021-12-22

## 2021-09-27 RX ORDER — LOSARTAN POTASSIUM 50 MG/1
TABLET ORAL
Qty: 90 TABLET | Refills: 0 | Status: SHIPPED | OUTPATIENT
Start: 2021-09-27 | End: 2021-12-09

## 2021-10-12 ENCOUNTER — OFFICE VISIT (OUTPATIENT)
Dept: FAMILY MEDICINE CLINIC | Facility: CLINIC | Age: 76
End: 2021-10-12

## 2021-10-12 VITALS
HEART RATE: 57 BPM | TEMPERATURE: 98.4 F | HEIGHT: 65 IN | DIASTOLIC BLOOD PRESSURE: 78 MMHG | WEIGHT: 261 LBS | BODY MASS INDEX: 43.49 KG/M2 | SYSTOLIC BLOOD PRESSURE: 139 MMHG | RESPIRATION RATE: 16 BRPM | OXYGEN SATURATION: 94 %

## 2021-10-12 DIAGNOSIS — I10 PRIMARY HYPERTENSION: Primary | ICD-10-CM

## 2021-10-12 DIAGNOSIS — M81.0 AGE-RELATED OSTEOPOROSIS WITHOUT CURRENT PATHOLOGICAL FRACTURE: ICD-10-CM

## 2021-10-12 DIAGNOSIS — I48.0 PAROXYSMAL ATRIAL FIBRILLATION (HCC): ICD-10-CM

## 2021-10-12 DIAGNOSIS — K21.9 GASTROESOPHAGEAL REFLUX DISEASE WITHOUT ESOPHAGITIS: ICD-10-CM

## 2021-10-12 DIAGNOSIS — Z86.711 HISTORY OF PULMONARY EMBOLISM: ICD-10-CM

## 2021-10-12 DIAGNOSIS — E78.2 MIXED HYPERLIPIDEMIA: ICD-10-CM

## 2021-10-12 DIAGNOSIS — G47.33 OSA (OBSTRUCTIVE SLEEP APNEA): ICD-10-CM

## 2021-10-12 DIAGNOSIS — R73.01 IMPAIRED FASTING GLUCOSE: ICD-10-CM

## 2021-10-12 DIAGNOSIS — F43.9 SITUATIONAL STRESS: ICD-10-CM

## 2021-10-12 DIAGNOSIS — E61.1 LOW IRON: ICD-10-CM

## 2021-10-12 PROCEDURE — 90662 IIV NO PRSV INCREASED AG IM: CPT | Performed by: PHYSICIAN ASSISTANT

## 2021-10-12 PROCEDURE — 90471 IMMUNIZATION ADMIN: CPT | Performed by: PHYSICIAN ASSISTANT

## 2021-10-12 PROCEDURE — 99214 OFFICE O/P EST MOD 30 MIN: CPT | Performed by: PHYSICIAN ASSISTANT

## 2021-10-12 NOTE — PROGRESS NOTES
"Subjective   Jessica Ames is a 76 y.o. female.     History of Present Illness    Since the last visit, she has overall felt depressed.  She has Essential Hypertension and well controlled on current medication, Impaired fasting glucose and will monitor labs to watch for DMII, GERD controlled on PPI Rx, Hyperlipidemia on Zetia and is effective with lowering lipid values, Atrial Fibillation and remains under the care of their cardiologist for management, Hypothyroidism and must update labs to continue treatment and Vitamin D deficiency and labs are at goal >30 ng/mL.  she has been compliant with current medications have reviewed them.  The patient denies medication side effects.  Will refill medications. /78   Pulse 57   Temp 98.4 °F (36.9 °C) (Skin)   Resp 16   Ht 165.1 cm (65\")   Wt 118 kg (261 lb)   LMP  (LMP Unknown)   SpO2 94%   BMI 43.43 kg/m²   Failed Zantac!!  Results for orders placed or performed during the hospital encounter of 08/18/21   Holter Monitor - 48 Hour   Result Value Ref Range    Target HR (85%) 122 bpm    Max. Pred. HR (100%) 144 bpm   She is on Actonel for osteoporosis    Do note that when she saw cardiology 9/8/2021 her brother just passed away from Covid and did Rx Xanax for her acute grief.  Quantity of 10 tabs.  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 Bowman  Last EGD 11-1-18 DR Osuan  Cannot take statins; intol      To see ortho for knee--10-1-21 fell    Jessica Ames female 76 y.o., /78   Pulse 57   Temp 98.4 °F (36.9 °C) (Skin)   Resp 16   Ht 165.1 cm (65\")   Wt 118 kg (261 lb)   LMP  (LMP Unknown)   SpO2 94%   BMI 43.43 kg/m²   who presents today for new evaluation and treatment of Depression and Anxiety.  She reports depressed mood, crying spells, fatigue, anxiety, anhedonia, impaired " concentration, unable to relax and sleep disturbance. Onset of symptoms was approximately several months ago.  She denies current suicidal and homicidal ideation. Risk factors are lifestyle of multiple roles, grief and care for sister.  Previous treatment includes current Rx. She complains of the following medication side effects: none. The patient declines to go to counseling..  Very stressed!!! Taking care of brother in law estate also    .      The following portions of the patient's history were reviewed and updated as appropriate: allergies, current medications, past family history, past medical history, past social history, past surgical history and problem list.    Review of Systems   Constitutional: Positive for fatigue. Negative for activity change, appetite change and unexpected weight change.   HENT: Negative for nosebleeds and trouble swallowing.    Eyes: Negative for pain and visual disturbance.   Respiratory: Negative for chest tightness, shortness of breath and wheezing.    Cardiovascular: Negative for chest pain and palpitations.   Gastrointestinal: Negative for abdominal pain and blood in stool.   Endocrine: Negative.    Genitourinary: Negative for difficulty urinating and hematuria.   Musculoskeletal: Positive for arthralgias and joint swelling.   Skin: Negative for color change and rash.   Allergic/Immunologic: Negative.    Neurological: Negative for syncope and speech difficulty.   Hematological: Negative for adenopathy.   Psychiatric/Behavioral: Positive for dysphoric mood and sleep disturbance. Negative for agitation and confusion. The patient is nervous/anxious.    All other systems reviewed and are negative.      Objective   Physical Exam  Vitals and nursing note reviewed.   Constitutional:       General: She is not in acute distress.     Appearance: She is well-developed. She is obese. She is not ill-appearing or toxic-appearing.   HENT:      Head: Normocephalic.      Right Ear: External ear  normal.      Left Ear: External ear normal.      Nose: Nose normal.      Mouth/Throat:      Pharynx: Oropharynx is clear.   Eyes:      General: No scleral icterus.     Conjunctiva/sclera: Conjunctivae normal.      Pupils: Pupils are equal, round, and reactive to light.   Neck:      Thyroid: No thyromegaly.      Vascular: No carotid bruit.   Cardiovascular:      Rate and Rhythm: Normal rate and regular rhythm.      Pulses: Normal pulses.      Heart sounds: Normal heart sounds. No murmur heard.      Pulmonary:      Effort: Pulmonary effort is normal. No respiratory distress.      Breath sounds: Normal breath sounds. No rales.   Musculoskeletal:         General: No deformity. Normal range of motion.      Cervical back: Normal range of motion and neck supple.      Right lower leg: Edema present.      Left lower leg: Edema present.   Skin:     General: Skin is warm and dry.      Coloration: Skin is not jaundiced.      Findings: No rash.   Neurological:      General: No focal deficit present.      Mental Status: She is alert and oriented to person, place, and time. Mental status is at baseline.   Psychiatric:         Mood and Affect: Mood normal.         Behavior: Behavior normal.         Thought Content: Thought content normal.         Judgment: Judgment normal.         Assessment/Plan   Diagnoses and all orders for this visit:    1. Primary hypertension (Primary)  -     Comprehensive metabolic panel  -     Lipid panel  -     CBC and Differential  -     TSH  -     Hemoglobin A1c  -     T3, Free  -     T4, Free  -     Vitamin D 25 Hydroxy  -     Vitamin B12  -     Folate  -     Ferritin  -     Iron Profile    2. Mixed hyperlipidemia  -     Comprehensive metabolic panel  -     Lipid panel  -     CBC and Differential  -     TSH  -     Hemoglobin A1c  -     T3, Free  -     T4, Free  -     Vitamin D 25 Hydroxy  -     Vitamin B12  -     Folate  -     Ferritin  -     Iron Profile    3. ERIC (obstructive sleep apnea)  -      Comprehensive metabolic panel  -     Lipid panel  -     CBC and Differential  -     TSH  -     Hemoglobin A1c  -     T3, Free  -     T4, Free  -     Vitamin D 25 Hydroxy  -     Vitamin B12  -     Folate  -     Ferritin  -     Iron Profile    4. Paroxysmal atrial fibrillation (HCC)  -     Comprehensive metabolic panel  -     Lipid panel  -     CBC and Differential  -     TSH  -     Hemoglobin A1c  -     T3, Free  -     T4, Free  -     Vitamin D 25 Hydroxy  -     Vitamin B12  -     Folate  -     Ferritin  -     Iron Profile    5. Gastroesophageal reflux disease without esophagitis  -     Comprehensive metabolic panel  -     Lipid panel  -     CBC and Differential  -     TSH  -     Hemoglobin A1c  -     T3, Free  -     T4, Free  -     Vitamin D 25 Hydroxy  -     Vitamin B12  -     Folate  -     Ferritin  -     Iron Profile    6. History of pulmonary embolism  -     Comprehensive metabolic panel  -     Lipid panel  -     CBC and Differential  -     TSH  -     Hemoglobin A1c  -     T3, Free  -     T4, Free  -     Vitamin D 25 Hydroxy  -     Vitamin B12  -     Folate  -     Ferritin  -     Iron Profile    7. Impaired fasting glucose  -     Comprehensive metabolic panel  -     Lipid panel  -     CBC and Differential  -     TSH  -     Hemoglobin A1c  -     T3, Free  -     T4, Free  -     Vitamin D 25 Hydroxy  -     Vitamin B12  -     Folate  -     Ferritin  -     Iron Profile    8. Age-related osteoporosis without current pathological fracture  -     Comprehensive metabolic panel  -     Lipid panel  -     CBC and Differential  -     TSH  -     Hemoglobin A1c  -     T3, Free  -     T4, Free  -     Vitamin D 25 Hydroxy  -     Vitamin B12  -     Folate  -     Ferritin  -     Iron Profile    9. Low iron  -     Comprehensive metabolic panel  -     Lipid panel  -     CBC and Differential  -     TSH  -     Hemoglobin A1c  -     T3, Free  -     T4, Free  -     Vitamin D 25 Hydroxy  -     Vitamin B12  -     Folate  -     Ferritin  -      Iron Profile    10. Situational stress  -     Comprehensive metabolic panel  -     Lipid panel  -     CBC and Differential  -     TSH  -     Hemoglobin A1c  -     T3, Free  -     T4, Free  -     Vitamin D 25 Hydroxy  -     Vitamin B12  -     Folate  -     Ferritin  -     Iron Profile    Other orders  -     sertraline (Zoloft) 50 MG tablet; 1/2 tab PO daily for 4 days then one PO daily for stress  Dispense: 30 tablet; Refill: 1  -     Fluzone High-Dose 65+yrs      See ortho tomorrow  Use Cpap/Bipap every night  I will update iron ----will refer DR Bowman if low  Start Zoloft for situational stress and follow-up 4 weeks  Plan, Jessica Ames, was seen today.  she was seen for HTN and continue medication, Imparied fasting glucose and plan follow up labs, diet, and exercise, GERD and will continue on PPI medication, Hyperlipidemia and will continue current medication, Atrial Fibrillation and remains on medication for treatment and is stable, Atrial Fibrillation and remains under the care of their cardiologist for medical management and is stable, Hypothyroidism and will need to update labs for continued treatment and Vitamin D deficiency and will update labs .             Answers for HPI/ROS submitted by the patient on 10/5/2021  What is the primary reason for your visit?: High Blood Pressure

## 2021-10-12 NOTE — PROGRESS NOTES
Immunization  Immunization performed in LD by Theo Pandya MA. Patient tolerated the procedure well without complications.  10/12/21   Theo Pandya MA

## 2021-10-13 ENCOUNTER — OFFICE VISIT (OUTPATIENT)
Dept: ORTHOPEDIC SURGERY | Facility: CLINIC | Age: 76
End: 2021-10-13

## 2021-10-13 VITALS — HEIGHT: 65 IN | WEIGHT: 260 LBS | BODY MASS INDEX: 43.32 KG/M2 | TEMPERATURE: 97.1 F

## 2021-10-13 DIAGNOSIS — S80.00XA CONTUSION OF KNEE, UNSPECIFIED LATERALITY, INITIAL ENCOUNTER: ICD-10-CM

## 2021-10-13 DIAGNOSIS — Z91.81 HISTORY OF FALL: Primary | ICD-10-CM

## 2021-10-13 DIAGNOSIS — Z96.653 STATUS POST TOTAL BILATERAL KNEE REPLACEMENT: ICD-10-CM

## 2021-10-13 LAB
25(OH)D3+25(OH)D2 SERPL-MCNC: 54.6 NG/ML (ref 30–100)
ALBUMIN SERPL-MCNC: 4.5 G/DL (ref 3.5–5.2)
ALBUMIN/GLOB SERPL: 2.3 G/DL
ALP SERPL-CCNC: 76 U/L (ref 39–117)
ALT SERPL-CCNC: 20 U/L (ref 1–33)
AST SERPL-CCNC: 20 U/L (ref 1–32)
BASOPHILS # BLD AUTO: 0.01 10*3/MM3 (ref 0–0.2)
BASOPHILS NFR BLD AUTO: 0.2 % (ref 0–1.5)
BILIRUB SERPL-MCNC: 0.5 MG/DL (ref 0–1.2)
BUN SERPL-MCNC: 12 MG/DL (ref 8–23)
BUN/CREAT SERPL: 13.8 (ref 7–25)
CALCIUM SERPL-MCNC: 9.1 MG/DL (ref 8.6–10.5)
CHLORIDE SERPL-SCNC: 105 MMOL/L (ref 98–107)
CHOLEST SERPL-MCNC: 197 MG/DL (ref 0–200)
CO2 SERPL-SCNC: 26 MMOL/L (ref 22–29)
CREAT SERPL-MCNC: 0.87 MG/DL (ref 0.57–1)
EOSINOPHIL # BLD AUTO: 0.01 10*3/MM3 (ref 0–0.4)
EOSINOPHIL NFR BLD AUTO: 0.2 % (ref 0.3–6.2)
ERYTHROCYTE [DISTWIDTH] IN BLOOD BY AUTOMATED COUNT: 14.2 % (ref 12.3–15.4)
FERRITIN SERPL-MCNC: 44.3 NG/ML (ref 13–150)
FOLATE SERPL-MCNC: 12.8 NG/ML (ref 4.78–24.2)
GLOBULIN SER CALC-MCNC: 2 GM/DL
GLUCOSE SERPL-MCNC: 108 MG/DL (ref 65–99)
HBA1C MFR BLD: 6.1 % (ref 4.8–5.6)
HCT VFR BLD AUTO: 40 % (ref 34–46.6)
HDLC SERPL-MCNC: 43 MG/DL (ref 40–60)
HGB BLD-MCNC: 13.5 G/DL (ref 12–15.9)
IMM GRANULOCYTES # BLD AUTO: 0.02 10*3/MM3 (ref 0–0.05)
IMM GRANULOCYTES NFR BLD AUTO: 0.3 % (ref 0–0.5)
IRON SATN MFR SERPL: 16 % (ref 20–50)
IRON SERPL-MCNC: 74 MCG/DL (ref 37–145)
LDLC SERPL CALC-MCNC: 122 MG/DL (ref 0–100)
LYMPHOCYTES # BLD AUTO: 2.1 10*3/MM3 (ref 0.7–3.1)
LYMPHOCYTES NFR BLD AUTO: 36.1 % (ref 19.6–45.3)
MCH RBC QN AUTO: 30.2 PG (ref 26.6–33)
MCHC RBC AUTO-ENTMCNC: 33.8 G/DL (ref 31.5–35.7)
MCV RBC AUTO: 89.5 FL (ref 79–97)
MONOCYTES # BLD AUTO: 0.54 10*3/MM3 (ref 0.1–0.9)
MONOCYTES NFR BLD AUTO: 9.3 % (ref 5–12)
NEUTROPHILS # BLD AUTO: 3.13 10*3/MM3 (ref 1.7–7)
NEUTROPHILS NFR BLD AUTO: 53.9 % (ref 42.7–76)
NRBC BLD AUTO-RTO: 0.2 /100 WBC (ref 0–0.2)
PLATELET # BLD AUTO: 144 10*3/MM3 (ref 140–450)
POTASSIUM SERPL-SCNC: 4 MMOL/L (ref 3.5–5.2)
PROT SERPL-MCNC: 6.5 G/DL (ref 6–8.5)
RBC # BLD AUTO: 4.47 10*6/MM3 (ref 3.77–5.28)
SODIUM SERPL-SCNC: 144 MMOL/L (ref 136–145)
T3FREE SERPL-MCNC: 2.9 PG/ML (ref 2–4.4)
T4 FREE SERPL-MCNC: 1.5 NG/DL (ref 0.93–1.7)
TIBC SERPL-MCNC: 464 MCG/DL
TRIGL SERPL-MCNC: 182 MG/DL (ref 0–150)
TSH SERPL DL<=0.005 MIU/L-ACNC: 2.65 UIU/ML (ref 0.27–4.2)
UIBC SERPL-MCNC: 390 MCG/DL (ref 112–346)
VIT B12 SERPL-MCNC: 1677 PG/ML (ref 211–946)
VLDLC SERPL CALC-MCNC: 32 MG/DL (ref 5–40)
WBC # BLD AUTO: 5.81 10*3/MM3 (ref 3.4–10.8)

## 2021-10-13 PROCEDURE — 73562 X-RAY EXAM OF KNEE 3: CPT | Performed by: ORTHOPAEDIC SURGERY

## 2021-10-13 PROCEDURE — 99213 OFFICE O/P EST LOW 20 MIN: CPT | Performed by: ORTHOPAEDIC SURGERY

## 2021-10-13 NOTE — PROGRESS NOTES
Patient Name: Jessica Ames   YOB: 1945  Referring Primary Care Physician: Hortencia Jackman PA-C  BMI: Body mass index is 43.27 kg/m².    Chief Complaint:    Chief Complaint   Patient presents with   • Left Knee - Follow-up   • Right Knee - Follow-up        HPI:     Jessica Ames is a 76 y.o. female who presents today for evaluation of   Chief Complaint   Patient presents with   • Left Knee - Follow-up   • Right Knee - Follow-up   .  Jessica seen back today complaining of bilateral knee pain.  She had total knee replacements in 2014 is done reasonably well but she had a hard fall on her knees recently.  She said it was on October 1.  It hurt at first it is gradually getting better and she wanted to get it checked to make sure there is nothing wrong with her knee implants.  She says is gradually getting better      Subjective   Medications:   Home Medications:  Current Outpatient Medications on File Prior to Visit   Medication Sig   • aspirin 81 MG EC tablet Take 81 mg by mouth Daily.   • Cholecalciferol 2000 units capsule Take 2,000 Units by mouth Daily. One PO daily   • Cyanocobalamin (VITAMIN B-12) 1000 MCG sublingual tablet Place 1,000 mcg under the tongue Daily. One SL daily   • ezetimibe (ZETIA) 10 MG tablet TAKE 1 TABLET DAILY FOR CHOLESTEROL   • levothyroxine (SYNTHROID, LEVOTHROID) 50 MCG tablet TAKE 1 TABLET DAILY FOR THYROID   • losartan (COZAAR) 50 MG tablet TAKE 1 TABLET DAILY FOR BLOOD PRESSURE (NEW DOSE)   • metoprolol succinate XL (TOPROL-XL) 25 MG 24 hr tablet 1 PO once daily for heart   • omeprazole (priLOSEC) 40 MG capsule TAKE 1 CAPSULE TWICE A DAY FOR GERD   • risedronate (ACTONEL) 150 MG tablet Take 150 mg by mouth Every 30 (Thirty) Days.   • sertraline (Zoloft) 50 MG tablet 1/2 tab PO daily for 4 days then one PO daily for stress   • ALPRAZolam (Xanax) 0.25 MG tablet Take 1 tablet by mouth 2 (Two) Times a Day As Needed for Anxiety.     No current facility-administered medications on  "file prior to visit.     Current Medications:  Scheduled Meds:  Continuous Infusions:No current facility-administered medications for this visit.    PRN Meds:.    I have reviewed the patient's medical history in detail and updated the computerized patient record.  Review and summarization of old records includes:    Past Medical History:   Diagnosis Date   • Disease of thyroid gland    • DVT (deep venous thrombosis) ([facility]) 2013    right leg; s/p knee replacement; was on xarelto and now baby aspirin; followed by Dr. Parminder Mejia     • Factor V Leiden ([facility])    • First degree AV block 12/7/2012   • GERD (gastroesophageal reflux disease) 12/7/2012   • Hiatal hernia    • Hyperlipidemia    • Hypertension    • IFG (impaired fasting glucose)    • Iron deficiency anemia     sees Dr. Clem Bowman    • Left anterior fascicular block 12/7/2012   • Lower extremity edema    • Obesity 12/7/2012   • ERIC (obstructive sleep apnea)     compliant with CPAP machine    • PAF (paroxysmal atrial fibrillation) ([facility]) 11/2015    day after colonoscopy went into atrial fibrillation; was on Xarelto and now baby aspirin    • Pulmonary embolism ([facility]) 2013    also had DVT    • PVC's (premature ventricular contractions) 12/7/2012   • Venous insufficiency     followed by Dr. Parminder Mejia         Past Surgical History:   Procedure Laterality Date   • BREAST SURGERY      reduction   • CATH LAB PROCEDURE     • HAND SURGERY     • HYSTERECTOMY     • ROTATOR CUFF REPAIR     • SHOULDER SURGERY     • TOTAL KNEE ARTHROPLASTY          Social History     Occupational History   • Not on file   Tobacco Use   • Smoking status: Never Smoker   • Smokeless tobacco: Never Used   • Tobacco comment: minimal caffeine   Vaping Use   • Vaping Use: Never used   Substance and Sexual Activity   • Alcohol use: Yes     Comment: \" once month\"   • Drug use: No   • Sexual activity: Defer      Social History     Social History Narrative   • Not on file        Family History " "  Problem Relation Age of Onset   • Stroke Mother    • Diabetes Mother    • Hypertension Mother    • Uterine cancer Mother    • Hypertension Father    • Heart attack Father    • Emphysema Father    • Diabetes Sister    • Hypertension Sister    • Stroke Brother    • Diabetes Brother    • Hypertension Brother        ROS: 14 point review of systems was performed and all other systems were reviewed and are negative except for documented findings in HPI and today's encounter.     Allergies:   Allergies   Allergen Reactions   • Adenosine Other (See Comments)     Paralyzed lungs and vocal chords   • Lisinopril Cough   • Celecoxib Palpitations     LE EDEMA   • Naproxen Palpitations     LE EDEMA   • Risedronate Palpitations   • Risedronate Sodium Palpitations     LE EDEMA   • Sulfa Antibiotics Hives     Constitutional:  Denies fever, shaking or chills   Eyes:  Denies change in visual acuity   HENT:  Denies nasal congestion or sore throat   Respiratory:  Denies cough or shortness of breath   Cardiovascular:  Denies chest pain or severe LE edema   GI:  Denies abdominal pain, nausea, vomiting, bloody stools or diarrhea   Musculoskeletal:  Numbness, tingling, pain, or loss of motor function only as noted above in history of present illness.  : Denies painful urination or hematuria  Integument:  Denies rash, lesion or ulceration   Neurologic:  Denies headache or focal weakness  Endocrine:  Denies lymphadenopathy  Psych:  Denies confusion or change in mental status   Hem:  Denies active bleeding    OBJECTIVE:  Physical Exam: 76 y.o. female  Wt Readings from Last 3 Encounters:   10/13/21 118 kg (260 lb)   10/12/21 118 kg (261 lb)   09/08/21 119 kg (263 lb 3.2 oz)     Ht Readings from Last 1 Encounters:   10/13/21 165.1 cm (65\")     Body mass index is 43.27 kg/m².  Vitals:    10/13/21 0900   Temp: 97.1 °F (36.2 °C)     Vital signs reviewed.     General Appearance:    Alert, cooperative, in no acute distress                  Eyes: " conjunctiva clear  ENT: external ears and nose atraumatic  CV: no peripheral edema  Resp: normal respiratory effort  Skin: no rashes or wounds; normal turgor  Psych: mood and affect appropriate  Lymph: no nodes appreciated  Neuro: gross sensation intact  Vascular:  Palpable peripheral pulse in noted extremity  Musculoskeletal Extremities: Exam today shows she has visible bruising anteriorly on her knee on the right over the patellar tendon area diffusely sore at the quadriceps insertion bilaterally but no defects found and she is able to transfer and ambulate and sit back down she has good range of motion varus valgus is stable and I do not feel any significant swelling of the bursa although there is large soft tissue covering    Radiology:   AP lateral sunrise view bilateral knees taken the office today for trauma status post total knee with comparison view shows well-placed bilateral total knees.  She had patelloplasty's.        Assessment:     ICD-10-CM ICD-9-CM   1. History of fall  Z91.81 V15.88   2. Contusion of knee, unspecified laterality, initial encounter  S80.00XA 924.11   3. Status post total bilateral knee replacement  Z96.653 V43.65        MDM/Plan:   The diagnosis(es), natural history, pathophysiology and treatment for diagnosis(es) were discussed. Opportunity given and questions answered.  Biomechanics of pertinent body areas discussed.  When appropriate, the use of ambulatory aids discussed.    MEDICATIONS:  The risks, benefits, warnings,side effects and alternatives of medications discussed.  Inflammation/pain control; with cold, heat, elevation and/or liniments discussed as appropriate  She cannot take anti-inflammatories but she can take Tylenol she use ice heat liniments and expected to gradually get better.  See her back if she has to be seen.    10/13/2021    Much of this encounter note is an electronic transcription/translation of spoken language to printed text. The electronic translation of  spoken language may permit erroneous, or at times, nonsensical words or phrases to be inadvertently transcribed; Although I have reviewed the note for such errors, some may still exist

## 2021-11-09 ENCOUNTER — OFFICE VISIT (OUTPATIENT)
Dept: FAMILY MEDICINE CLINIC | Facility: CLINIC | Age: 76
End: 2021-11-09

## 2021-11-09 VITALS
DIASTOLIC BLOOD PRESSURE: 60 MMHG | TEMPERATURE: 97.1 F | WEIGHT: 266 LBS | HEART RATE: 49 BPM | RESPIRATION RATE: 16 BRPM | HEIGHT: 65 IN | OXYGEN SATURATION: 96 % | SYSTOLIC BLOOD PRESSURE: 140 MMHG | BODY MASS INDEX: 44.32 KG/M2

## 2021-11-09 DIAGNOSIS — M79.18 MUSCLE PAIN, LUMBAR: Primary | ICD-10-CM

## 2021-11-09 PROCEDURE — 99213 OFFICE O/P EST LOW 20 MIN: CPT

## 2021-11-09 RX ORDER — CYCLOBENZAPRINE HCL 5 MG
5 TABLET ORAL 3 TIMES DAILY PRN
Qty: 30 TABLET | Refills: 0 | Status: SHIPPED | OUTPATIENT
Start: 2021-11-09 | End: 2021-11-16 | Stop reason: SDUPTHER

## 2021-11-09 NOTE — PROGRESS NOTES
Chief Complaint  Back Pain    Subjective          Jessica Ames presents to [facility]  History of Present Illness    Jessica Ames 76 y.o. female presents for evaluation and treatment of  low back pain. The patient first noted symptoms 5 days ago. It started after bending over to get clothes out of the dryer.  The pain intensity is intermittent , and is located right side, left side, and lumbar. The pain is described as throbbing and occurs intermittently. The symptoms are progressive. Symptoms are exacerbated by standing, sitting and laying down. Factors which relieve the pain include heat. Other associated symptoms include no other symptoms. Previous history of symptoms: never. Treatment efforts have included heat and Tylenol , with and without relief.  The patient stated she fell onto her knees and her nose on October 1.  She was evaluated by her established Orthopedic specialist after the fall.       She has a problem list of the following:   Patient Active Problem List   Diagnosis   • Hypertension   • Hyperlipidemia   • ERIC (obstructive sleep apnea)   • Impaired fasting glucose   • First degree AV block   • Left anterior fascicular block   • Obesity   • Paroxysmal atrial fibrillation (Formerly [facility])   • PVC's (premature ventricular contractions)   • GERD (gastroesophageal reflux disease)   • Macular degeneration   • Glaucoma   • Venous insufficiency   • History of pulmonary embolism   • Chronic anticoagulation   • DVT (deep venous thrombosis) (Formerly [facility])   • Factor 5 Leiden mutation, heterozygous (Formerly [facility])   • PE (pulmonary thromboembolism) (Formerly [facility])   • A-fib (Formerly [facility])   • Osteoporosis without current pathological fracture   .        Since the last visit, She has overall felt well.        She has been compliant with the following medications:   Current Outpatient Medications:   •  ALPRAZolam (Xanax) 0.25 MG tablet, Take 1 tablet by mouth 2 (Two) Times a Day As Needed for Anxiety., Disp: 10 tablet, Rfl: 0  •   "aspirin 81 MG EC tablet, Take 81 mg by mouth Daily., Disp: , Rfl:   •  Cholecalciferol 2000 units capsule, Take 2,000 Units by mouth Daily. One PO daily, Disp: 90 each, Rfl: 3  •  Cyanocobalamin (VITAMIN B-12) 1000 MCG sublingual tablet, Place 1,000 mcg under the tongue Daily. One SL daily, Disp: 90 each, Rfl: 3  •  ezetimibe (ZETIA) 10 MG tablet, TAKE 1 TABLET DAILY FOR CHOLESTEROL, Disp: 90 tablet, Rfl: 3  •  levothyroxine (SYNTHROID, LEVOTHROID) 50 MCG tablet, TAKE 1 TABLET DAILY FOR THYROID, Disp: 90 tablet, Rfl: 0  •  losartan (COZAAR) 50 MG tablet, TAKE 1 TABLET DAILY FOR BLOOD PRESSURE (NEW DOSE), Disp: 90 tablet, Rfl: 0  •  metoprolol succinate XL (TOPROL-XL) 25 MG 24 hr tablet, 1 PO once daily for heart, Disp: 90 tablet, Rfl: 3  •  omeprazole (priLOSEC) 40 MG capsule, TAKE 1 CAPSULE TWICE A DAY FOR GERD, Disp: 180 capsule, Rfl: 0  •  risedronate (ACTONEL) 150 MG tablet, Take 150 mg by mouth Every 30 (Thirty) Days., Disp: , Rfl:   •  sertraline (Zoloft) 50 MG tablet, 1/2 tab PO daily for 4 days then one PO daily for stress, Disp: 30 tablet, Rfl: 1  •  cyclobenzaprine (FLEXERIL) 5 MG tablet, Take 1 tablet by mouth 3 (Three) Times a Day As Needed for Muscle Spasms for up to 10 days. Avoid with driving. Do not use alcohol with this prescription, Disp: 30 tablet, Rfl: 0.        She  denies medication side effects.      All of the other chronic condition(s) listed above are stable w/o issues.    /60   Pulse (!) 49   Temp 97.1 °F (36.2 °C)   Resp 16   Ht 165.1 cm (65\")   Wt 121 kg (266 lb)   LMP  (LMP Unknown)   SpO2 96%   BMI 44.26 kg/m²     Results for orders placed or performed in visit on 10/12/21   Comprehensive metabolic panel    Specimen: Blood   Result Value Ref Range    Glucose 108 (H) 65 - 99 mg/dL    BUN 12 8 - 23 mg/dL    Creatinine 0.87 0.57 - 1.00 mg/dL    eGFR Non African Am 63 >60 mL/min/1.73    eGFR African Am 77 >60 mL/min/1.73    BUN/Creatinine Ratio 13.8 7.0 - 25.0    Sodium 144 " 136 - 145 mmol/L    Potassium 4.0 3.5 - 5.2 mmol/L    Chloride 105 98 - 107 mmol/L    Total CO2 26.0 22.0 - 29.0 mmol/L    Calcium 9.1 8.6 - 10.5 mg/dL    Total Protein 6.5 6.0 - 8.5 g/dL    Albumin 4.50 3.50 - 5.20 g/dL    Globulin 2.0 gm/dL    A/G Ratio 2.3 g/dL    Total Bilirubin 0.5 0.0 - 1.2 mg/dL    Alkaline Phosphatase 76 39 - 117 U/L    AST (SGOT) 20 1 - 32 U/L    ALT (SGPT) 20 1 - 33 U/L   Lipid panel    Specimen: Blood   Result Value Ref Range    Total Cholesterol 197 0 - 200 mg/dL    Triglycerides 182 (H) 0 - 150 mg/dL    HDL Cholesterol 43 40 - 60 mg/dL    VLDL Cholesterol Danny 32 5 - 40 mg/dL    LDL Chol Calc (NIH) 122 (H) 0 - 100 mg/dL   TSH    Specimen: Blood   Result Value Ref Range    TSH 2.650 0.270 - 4.200 uIU/mL   Hemoglobin A1c    Specimen: Blood   Result Value Ref Range    Hemoglobin A1C 6.10 (H) 4.80 - 5.60 %   T3, Free    Specimen: Blood   Result Value Ref Range    T3, Free 2.9 2.0 - 4.4 pg/mL   T4, Free    Specimen: Blood   Result Value Ref Range    Free T4 1.50 0.93 - 1.70 ng/dL   Vitamin D 25 Hydroxy    Specimen: Blood   Result Value Ref Range    25 Hydroxy, Vitamin D 54.6 30.0 - 100.0 ng/ml   Vitamin B12    Specimen: Blood   Result Value Ref Range    Vitamin B-12 1,677 (H) 211 - 946 pg/mL   Folate    Specimen: Blood   Result Value Ref Range    Folate 12.80 4.78 - 24.20 ng/mL   Ferritin    Specimen: Blood   Result Value Ref Range    Ferritin 44.30 13.00 - 150.00 ng/mL   Iron Profile    Specimen: Blood   Result Value Ref Range    TIBC 464 mcg/dL    UIBC 390 (H) 112 - 346 mcg/dL    Iron 74 37 - 145 mcg/dL    Iron Saturation 16 (L) 20 - 50 %   CBC and Differential    Specimen: Blood   Result Value Ref Range    WBC 5.81 3.40 - 10.80 10*3/mm3    RBC 4.47 3.77 - 5.28 10*6/mm3    Hemoglobin 13.5 12.0 - 15.9 g/dL    Hematocrit 40.0 34.0 - 46.6 %    MCV 89.5 79.0 - 97.0 fL    MCH 30.2 26.6 - 33.0 pg    MCHC 33.8 31.5 - 35.7 g/dL    RDW 14.2 12.3 - 15.4 %    Platelets 144 140 - 450 10*3/mm3     "Neutrophil Rel % 53.9 42.7 - 76.0 %    Lymphocyte Rel % 36.1 19.6 - 45.3 %    Monocyte Rel % 9.3 5.0 - 12.0 %    Eosinophil Rel % 0.2 (L) 0.3 - 6.2 %    Basophil Rel % 0.2 0.0 - 1.5 %    Neutrophils Absolute 3.13 1.70 - 7.00 10*3/mm3    Lymphocytes Absolute 2.10 0.70 - 3.10 10*3/mm3    Monocytes Absolute 0.54 0.10 - 0.90 10*3/mm3    Eosinophils Absolute 0.01 0.00 - 0.40 10*3/mm3    Basophils Absolute 0.01 0.00 - 0.20 10*3/mm3    Immature Granulocyte Rel % 0.3 0.0 - 0.5 %    Immature Grans Absolute 0.02 0.00 - 0.05 10*3/mm3    nRBC 0.2 0.0 - 0.2 /100 WBC                  Objective     Vital Signs:   /60   Pulse (!) 49   Temp 97.1 °F (36.2 °C)   Resp 16   Ht 165.1 cm (65\")   Wt 121 kg (266 lb)   SpO2 96%   BMI 44.26 kg/m²      Review of Systems   Constitutional: Negative for appetite change and fever.   HENT: Negative for nosebleeds and trouble swallowing.    Eyes: Negative for blurred vision and visual disturbance.   Respiratory: Negative for choking, shortness of breath and wheezing.    Cardiovascular: Negative for chest pain and palpitations.   Gastrointestinal: Negative for abdominal pain and blood in stool.   Genitourinary: Negative.  Negative for dysuria, frequency, hematuria, pelvic pain, pelvic pressure and urgency.   Musculoskeletal: Positive for back pain. Negative for gait problem, joint swelling, neck pain and neck stiffness.   Skin: Negative.  Negative for rash.   Allergic/Immunologic: Negative for immunocompromised state.   Neurological: Negative for syncope, facial asymmetry and speech difficulty.   Hematological: Negative for adenopathy.   Psychiatric/Behavioral: Negative for dysphoric mood, self-injury and suicidal ideas.         Physical Exam  Vitals and nursing note reviewed.   Constitutional:       General: She is not in acute distress.     Appearance: She is well-developed. She is not ill-appearing or toxic-appearing.   HENT:      Head: Normocephalic.      Right Ear: External ear " normal.      Left Ear: External ear normal.   Eyes:      General: No scleral icterus.     Pupils: Pupils are equal, round, and reactive to light.   Neck:      Thyroid: No thyromegaly.   Cardiovascular:      Rate and Rhythm: Normal rate and regular rhythm.      Heart sounds: Normal heart sounds.   Pulmonary:      Effort: Pulmonary effort is normal. No respiratory distress.      Breath sounds: Normal breath sounds. No stridor.   Musculoskeletal:         General: No deformity.      Cervical back: Normal range of motion and neck supple.      Lumbar back: Tenderness and bony tenderness present. No swelling, edema, deformity, signs of trauma or spasms. Normal range of motion. Negative right straight leg raise test and negative left straight leg raise test.      Right lower leg: No edema.      Left lower leg: No edema.      Comments: Tenderness located at L4-L5.  No swelling, decreased range of motion, or edema.  Negative bilateral straight leg raise.   Skin:     General: Skin is warm.      Coloration: Skin is not jaundiced.   Neurological:      General: No focal deficit present.      Mental Status: She is alert and oriented to person, place, and time.      Motor: No weakness.      Gait: Gait normal.   Psychiatric:         Behavior: Behavior normal.         Thought Content: Thought content normal.         Judgment: Judgment normal.          Result Review :                Assessment and Plan      Diagnoses and all orders for this visit:    1. Muscle pain, lumbar (Primary)  -     cyclobenzaprine (FLEXERIL) 5 MG tablet; Take 1 tablet by mouth 3 (Three) Times a Day As Needed for Muscle Spasms for up to 10 days. Avoid with driving. Do not use alcohol with this prescription  Dispense: 30 tablet; Refill: 0            Follow Up     Return if symptoms worsen or fail to improve.    Patient was given instructions and counseling regarding her condition or for health maintenance advice. Please see specific information pulled into the  AVS if appropriate.  The patient was given information in her after visit summary today regarding muscle pain.    -Take all medications as prescribed.  The patient has taken Cyclobenzaprine in the past and tolerated it well with no side effects.  The patient was instructed to not drink alcohol and to not drive while taking this medication.  -Increase daily water intake  -Return to the office for worsening signs or symptoms, fever, chills, pain going down your legs, or if symptoms do not improve after 2 weeks.  -If symptoms do not improve in 2 weeks make an appointment with your established Orthopedic specialist.  -Seek immediate medical attention for loss of bladder or bowel function, weakness or numbness in arms or legs, nausea, vomiting, abdominal pain, or syncope.  -The patient verbalized understanding of all instructions given today.

## 2021-11-09 NOTE — PATIENT INSTRUCTIONS
Muscle Pain, Adult  Muscle pain, also called myalgia, is a condition in which a person has pain in one or more muscles in the body. Muscle pain may be mild, moderate, or severe. It may feel sharp, achy, or burning. In most cases, the pain lasts only a short time and goes away without treatment.  Muscle pain can result from using muscles in a new or different way or after a period of inactivity. It is normal to feel some muscle pain after starting an exercise program. Muscles that have not been used often will be sore at first.  What are the causes?  This condition is caused by using muscles in a new or different way after a period of inactivity. Other causes may include:  · Overuse or muscle strain, especially if you are not in shape. This is the most common cause of muscle pain.  · Injury or bruising.  · Infectious diseases, including diseases caused by viruses, such as the flu (influenza).  · Fibromyalgia.This is a long-term, or chronic, condition that causes muscle tenderness, tiredness (fatigue), and headache.  · Autoimmune or rheumatologic diseases. These are conditions, such as lupus, in which the body's defense system (immunesystem) attacks areas in the body.  · Certain medicines, including ACE inhibitors and statins.  What are the signs or symptoms?  The main symptom of this condition is sore or painful muscles, including during activity and when stretching. You may also have slight swelling.  How is this diagnosed?  This condition is diagnosed with a physical exam. Your health care provider will ask questions about your pain and when it began. If you have not had muscle pain for very long, your health care provider may want to wait before doing much testing. If your muscle pain has lasted a long time, tests may be done right away. In some cases, this may include tests to rule out certain conditions or illnesses.  How is this treated?  Treatment for this condition depends on the cause. Home care is often  enough to relieve muscle pain. Your health care provider may also prescribe NSAIDs, such as ibuprofen.  Follow these instructions at home:  Medicines  · Take over-the-counter and prescription medicines only as told by your health care provider.  · Ask your health care provider if the medicine prescribed to you requires you to avoid driving or using machinery.  Managing pain, swelling, and discomfort         · If directed, put ice on the painful area. To do this:  ? Put ice in a plastic bag.  ? Place a towel between your skin and the bag.  ? Leave the ice on for 20 minutes, 2-3 times a day.  · For the first 2 days of muscle soreness, or if there is swelling:  ? Do not soak in hot baths.  ? Do not use a hot tub, steam room, sauna, heating pad, or other heat source.  · After 48-72 hours, you may alternate between applying ice and applying heat as told by your health care provider. If directed, apply heat to the affected area as often as told by your health care provider. Use the heat source that your health care provider recommends, such as a moist heat pack or a heating pad.  ? Place a towel between your skin and the heat source.  ? Leave the heat on for 20-30 minutes.  ? Remove the heat if your skin turns bright red. This is especially important if you are unable to feel pain, heat, or cold. You may have a greater risk of getting burned.  · If you have an injury, raise (elevate) the injured area above the level of your heart while you are sitting or lying down.  Activity    · If overuse is causing your muscle pain:  ? Slow down your activities until the pain goes away.  ? Do regular, gentle exercises if you are not usually active.  ? Warm up before exercising. Stretch before and after exercising. This can help lower the risk of muscle pain.  · Do not continue working out if the pain is severe. Severe pain could mean that you have injured a muscle.  · Do not lift anything that is heavier than 5-10 lb (2.3-4.5 kg), or  the limit that you are told, until your health care provider says that it is safe.  · Return to your normal activities as told by your health care provider. Ask your health care provider what activities are safe for you.    General instructions  · Do not use any products that contain nicotine or tobacco, such as cigarettes, e-cigarettes, and chewing tobacco. These can delay healing. If you need help quitting, ask your health care provider.  · Keep all follow-up visits as told by your health care provider. This is important.  Contact a health care provider if you have:  · Muscle pain that gets worse and medicines do not help.  · Muscle pain that lasts longer than 3 days.  · A rash or fever along with muscle pain.  · Muscle pain after a tick bite.  · Muscle pain while working out, even though you are in good physical condition.  · Redness, soreness, or swelling along with muscle pain.  · Muscle pain after starting a new medicine or changing the dose of a medicine.  Get help right away if you have:  · Trouble breathing.  · Trouble swallowing.  · Muscle pain along with a stiff neck, fever, and vomiting.  · Severe muscle weakness or you cannot move part of your body.  These symptoms may represent a serious problem that is an emergency. Do not wait to see if the symptoms will go away. Get medical help right away. Call your local emergency services (911 in the [place] Do not drive yourself to the hospital.  Summary  · Muscle pain usually lasts only a short time and goes away without treatment.  · This condition is caused by using muscles in a new or different way after a period of inactivity.  · If your muscle pain lasts longer than 3 days, tell your health care provider.  This information is not intended to replace advice given to you by your health care provider. Make sure you discuss any questions you have with your health care provider.  Document Revised: 09/25/2020 Document Reviewed: 09/25/2020  [employer]vier Patient  Education © 2021 [employer]

## 2021-11-16 ENCOUNTER — TELEMEDICINE (OUTPATIENT)
Dept: FAMILY MEDICINE CLINIC | Facility: CLINIC | Age: 76
End: 2021-11-16

## 2021-11-16 DIAGNOSIS — M79.18 MUSCLE PAIN, LUMBAR: ICD-10-CM

## 2021-11-16 PROCEDURE — 99213 OFFICE O/P EST LOW 20 MIN: CPT | Performed by: PHYSICIAN ASSISTANT

## 2021-11-16 RX ORDER — CYCLOBENZAPRINE HCL 5 MG
5 TABLET ORAL 3 TIMES DAILY PRN
Qty: 60 TABLET | Refills: 5 | Status: SHIPPED | OUTPATIENT
Start: 2021-11-16 | End: 2022-05-27 | Stop reason: ALTCHOICE

## 2021-11-16 NOTE — PROGRESS NOTES
Subjective   Jessica Ames is a 76 y.o. female.   You have chosen to receive care through a telehealth visit.  Do you consent to use a video/audio connection for your medical care today? Yes    History of Present Illness   Jessica Ames female 76 y.o., LMP  (LMP Unknown)   who presents today for follow up of Anxiety and grief.  She reports doing great with anxiety and grief since start Zoloft. Dose is helping. Feels better. Wants to stay on Rx. Onset of symptoms was approximately several months ago.  She denies current suicidal and homicidal ideation. Risk factors are family history of anxiety and or depression and lifestyle of multiple roles.  Previous treatment includes current Rx. She complains of the following medication side effects: noneThe patient declines to go to counseling..    Grief over brother in law  Now has sister living with her  Xanax for her acute grief.  Quantity of 10 tabs.  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 Bowman  Last EGD 11-1-18 DR Osuna  Cannot take statins; intol     BMI Readings from Last 1 Encounters:   11/09/21 44.26 kg/m²   ;  Muscle in back still sore--saw MIRA Baer  The following portions of the patient's history were reviewed and updated as appropriate: allergies, current medications, past family history, past medical history, past social history, past surgical history and problem list.    Review of Systems   Constitutional: Negative for activity change, appetite change and unexpected weight change.   HENT: Negative for nosebleeds and trouble swallowing.    Eyes: Negative for pain and visual disturbance.   Respiratory: Negative for chest tightness, shortness of breath and wheezing.    Cardiovascular: Negative for chest pain and palpitations.   Gastrointestinal: Negative for abdominal pain and blood in stool.    Endocrine: Negative.    Genitourinary: Negative for difficulty urinating and hematuria.   Musculoskeletal: Negative for joint swelling.   Skin: Negative for color change and rash.   Allergic/Immunologic: Negative.    Neurological: Negative for syncope and speech difficulty.   Hematological: Negative for adenopathy.   Psychiatric/Behavioral: Negative for agitation and confusion. The patient is not nervous/anxious.    All other systems reviewed and are negative.      Objective   Physical Exam  Constitutional:       General: She is not in acute distress.     Appearance: She is well-developed. She is not diaphoretic.   HENT:      Head: Normocephalic and atraumatic.   Eyes:      Conjunctiva/sclera: Conjunctivae normal.   Pulmonary:      Effort: Pulmonary effort is normal. No respiratory distress.   Musculoskeletal:         General: Normal range of motion.      Cervical back: Normal range of motion.   Skin:     General: Skin is dry.   Neurological:      Mental Status: She is alert and oriented to person, place, and time.      Coordination: Coordination normal.   Psychiatric:         Behavior: Behavior normal.         Thought Content: Thought content normal.         Judgment: Judgment normal.         Assessment/Plan   Diagnoses and all orders for this visit:    1. Muscle pain, lumbar  -     cyclobenzaprine (FLEXERIL) 5 MG tablet; Take 1 tablet by mouth 3 (Three) Times a Day As Needed for Muscle Spasms for up to 10 days. Avoid with driving. Do not use alcohol with this prescription  Dispense: 60 tablet; Refill: 5    Other orders  -     sertraline (Zoloft) 50 MG tablet; one PO daily for stress  Dispense: 90 tablet; Refill: 3      Flexeril helping lumbar spasms----refill  Keep Zoloft at 50mg and helping anxiety--stress, grief  Cont f/u cardio  Consider PT for lumbar if need--improving  Video 10 min

## 2021-11-16 NOTE — PATIENT INSTRUCTIONS
"[email or web address]">   Generalized Anxiety Disorder, Adult  Generalized anxiety disorder (FRANCISCO) is a mental health condition. Unlike normal worries, anxiety related to FRANCISCO is not triggered by a specific event. These worries do not fade or get better with time. FRANCISCO interferes with relationships, work, and school.  FRANCISCO symptoms can vary from mild to severe. People with severe FRANCISCO can have intense waves of anxiety with physical symptoms that are similar to panic attacks.  What are the causes?  The exact cause of FRANCISCO is not known, but the following are believed to have an impact:  · Differences in natural brain chemicals.  · Genes passed down from parents to children.  · Differences in the way threats are perceived.  · Development during childhood.  · Personality.  What increases the risk?  The following factors may make you more likely to develop this condition:  · Being female.  · Having a family history of anxiety disorders.  · Being very shy.  · Experiencing very stressful life events, such as the death of a loved one.  · Having a very stressful family environment.  What are the signs or symptoms?  People with FRANCISCO often worry excessively about many things in their lives, such as their health and family. Symptoms may also include:  · Mental and emotional symptoms:  ? Worrying excessively about natural disasters.  ? Fear of being late.  ? Difficulty concentrating.  ? Fears that others are judging your performance.  · Physical symptoms:  ? Fatigue.  ? Headaches, muscle tension, muscle twitches, trembling, or feeling shaky.  ? Feeling like your heart is pounding or beating very fast.  ? Feeling out of breath or like you cannot take a deep breath.  ? Having trouble falling asleep or staying asleep, or experiencing restlessness.  ? Sweating.  ? Nausea, diarrhea, or irritable bowel syndrome (IBS).  · Behavioral symptoms:  ? Experiencing erratic moods or irritability.  ? Avoidance of new situations.  ? Avoidance of " people.  ? Extreme difficulty making decisions.  How is this diagnosed?  This condition is diagnosed based on your symptoms and medical history. You will also have a physical exam. Your health care provider may perform tests to rule out other possible causes of your symptoms.  To be diagnosed with FRANCISCO, a person must have anxiety that:  · Is out of his or her control.  · Affects several different aspects of his or her life, such as work and relationships.  · Causes distress that makes him or her unable to take part in normal activities.  · Includes at least three symptoms of FRANCISCO, such as restlessness, fatigue, trouble concentrating, irritability, muscle tension, or sleep problems.  Before your health care provider can confirm a diagnosis of FRANCISCO, these symptoms must be present more days than they are not, and they must last for 6 months or longer.  How is this treated?  This condition may be treated with:  · Medicine. Antidepressant medicine is usually prescribed for long-term daily control. Anti-anxiety medicines may be added in severe cases, especially when panic attacks occur.  · Talk therapy (psychotherapy). Certain types of talk therapy can be helpful in treating FRANCISCO by providing support, education, and guidance. Options include:  ? Cognitive behavioral therapy (CBT). People learn coping skills and self-calming techniques to ease their physical symptoms. They learn to identify unrealistic thoughts and behaviors and to replace them with more appropriate thoughts and behaviors.  ? Acceptance and commitment therapy (ACT). This treatment teaches people how to be mindful as a way to cope with unwanted thoughts and feelings.  ? Biofeedback. This process trains you to manage your body's response (physiological response) through breathing techniques and relaxation methods. You will work with a therapist while machines are used to monitor your physical symptoms.  · Stress management techniques. These include yoga,  meditation, and exercise.  A mental health specialist can help determine which treatment is best for you. Some people see improvement with one type of therapy. However, other people require a combination of therapies.  Follow these instructions at home:  Lifestyle  · Maintain a consistent routine and schedule.  · Anticipate stressful situations. Create a plan, and allow extra time to work with your plan.  · Practice stress management or self-calming techniques that you have learned from your therapist or your health care provider.  General instructions  · Take over-the-counter and prescription medicines only as told by your health care provider.  · Understand that you are likely to have setbacks. Accept this and be kind to yourself as you persist to take better care of yourself.  · Recognize and accept your accomplishments, even if you  them as small.  · Keep all follow-up visits as told by your health care provider. This is important.  Contact a health care provider if:  · Your symptoms do not get better.  · Your symptoms get worse.  · You have signs of depression, such as:  ? A persistently sad or irritable mood.  ? Loss of enjoyment in activities that used to bring you zack.  ? Change in weight or eating.  ? Changes in sleeping habits.  ? Avoiding friends or family members.  ? Loss of energy for normal tasks.  ? Feelings of guilt or worthlessness.  Get help right away if:  · You have serious thoughts about hurting yourself or others.  If you ever feel like you may hurt yourself or others, or have thoughts about taking your own life, get help right away. Go to your nearest emergency department or:  · Call your local emergency services (391 in the [place]  · Call a suicide crisis helpline, such as the National Suicide Prevention Lifeline at 1-811.115.7406. This is open 24 hours a day in the [place]  · Text the Crisis Text Line at 966928 (in the [place]  Summary  · Generalized anxiety disorder (FRANCISCO) is a mental  health condition that involves worry that is not triggered by a specific event.  · People with FRANCISCO often worry excessively about many things in their lives, such as their health and family.  · FRANCISCO may cause symptoms such as restlessness, trouble concentrating, sleep problems, frequent sweating, nausea, diarrhea, headaches, and trembling or muscle twitching.  · A mental health specialist can help determine which treatment is best for you. Some people see improvement with one type of therapy. However, other people require a combination of therapies.  This information is not intended to replace advice given to you by your health care provider. Make sure you discuss any questions you have with your health care provider.  Document Revised: 10/07/2020 Document Reviewed: 10/07/2020  Elsevier Patient Education © 2021 [employer]

## 2021-12-06 ENCOUNTER — APPOINTMENT (OUTPATIENT)
Dept: SLEEP MEDICINE | Facility: HOSPITAL | Age: 76
End: 2021-12-06

## 2021-12-09 RX ORDER — LOSARTAN POTASSIUM 50 MG/1
TABLET ORAL
Qty: 90 TABLET | Refills: 1 | Status: SHIPPED | OUTPATIENT
Start: 2021-12-09 | End: 2022-08-06 | Stop reason: SDUPTHER

## 2021-12-13 ENCOUNTER — OFFICE VISIT (OUTPATIENT)
Dept: ORTHOPEDIC SURGERY | Facility: CLINIC | Age: 76
End: 2021-12-13

## 2021-12-13 VITALS — HEIGHT: 65 IN | WEIGHT: 266 LBS | TEMPERATURE: 98.6 F | BODY MASS INDEX: 44.32 KG/M2

## 2021-12-13 DIAGNOSIS — M25.572 ACUTE LEFT ANKLE PAIN: ICD-10-CM

## 2021-12-13 DIAGNOSIS — R52 PAIN: Primary | ICD-10-CM

## 2021-12-13 DIAGNOSIS — M76.822 POSTERIOR TIBIAL TENDINITIS OF LEFT LOWER EXTREMITY: ICD-10-CM

## 2021-12-13 PROCEDURE — 99213 OFFICE O/P EST LOW 20 MIN: CPT | Performed by: NURSE PRACTITIONER

## 2021-12-13 PROCEDURE — 73610 X-RAY EXAM OF ANKLE: CPT | Performed by: NURSE PRACTITIONER

## 2021-12-13 NOTE — PATIENT INSTRUCTIONS
Posterior Tibial Tendinitis  Posterior tibial tendinitis is irritation of a tendon called the posterior tibial tendon. Your posterior tibial tendon is a cord-like tissue that connects bones of your lower leg and foot to a muscle that:  · Supports your arch.  · Helps you raise up on your toes.  · Helps you turn your foot down and in.  This condition causes foot and ankle pain. It can also lead to a flat foot.  What are the causes?  This condition is most often caused by repeated stress to the tendon (overuse injury). It can also be caused by a sudden injury that stresses the tendon, such as landing on your foot after jumping or falling.  What increases the risk?  This condition is more likely to develop in:  · People who play a sport that involves putting a lot of pressure on the feet, such as:  ? Basketball.  ? Tennis.  ? Soccer.  ? Hockey.  · Runners.  · Females who are older than 40 years of age and are overweight.  · People with diabetes.  · People with decreased foot stability.  · People with flat feet.  What are the signs or symptoms?  Symptoms include:  · Pain in the inner ankle.  · Pain at the arch of your foot.  · Pain that gets worse with running, walking, or standing.  · Swelling on the inside of your ankle and foot.  · Weakness in your ankle or foot.  · Inability to stand up on tiptoe.  · Flattening of the arch of your foot.  How is this diagnosed?  This condition may be diagnosed based on:  · Your symptoms.  · Your medical history.  · A physical exam.  · Tests, such as:  ? X-ray.  ? MRI.  ? Ultrasound.  How is this treated?  This condition may be treated by:  · Putting ice to the injured area.  · Taking NSAIDs, such as ibuprofen, to reduce pain and swelling.  · Wearing a special shoe or shoe insert to support your arch (orthotic).  · Having physical therapy.  · Replacing high-impact exercise with low-impact exercise, such as swimming or cycling.  If your symptoms do not improve with these treatments, you  may need to wear a splint, removable walking boot, or short leg cast for 6-8 weeks to keep your foot and ankle still (immobilized).  Follow these instructions at home:  If you have a cast, splint, or boot:  · Keep it clean and dry.  · Check the skin around it every day. Tell your health care provider about any concerns.  If you have a cast:  · Do not stick anything inside it to scratch your skin. Doing that increases your risk of infection.  · You may put lotion on dry skin around the edges of the cast. Do not put lotion on the skin underneath the cast.  If you have a splint or boot:  · Wear it as told by your health care provider. Remove it only as told by your health care provider.  · Loosen it if your toes tingle, become numb, or turn cold and blue.  Bathing  · Do not take baths, swim, or use a hot tub until your health care provider approves. Ask your health care provider if you may take showers.  · If your cast, splint, or boot is not waterproof:  ? Do not let it get wet.  ? Cover it with a waterproof covering while you take a bath or a shower.  Managing pain and swelling    · If directed, put ice on the injured area.  ? If you have a removable splint or boot, remove it as told by your health care provider.  ? Put ice in a plastic bag.  ? Place a towel between your skin and the bag or between your cast and the bag.  ? Leave the ice on for 20 minutes, 2-3 times a day.  · Move your toes often to reduce stiffness and swelling.  · Raise (elevate) the injured area above the level of your heart while you are sitting or lying down.    Activity  · Do not use the injured foot to support your body weight until your health care provider says that you can. Use crutches as told by your health care provider.  · Do not do activities that make pain or swelling worse.  · Ask your health care provider when it is safe to drive if you have a cast, splint, or boot on your foot.  · Return to your normal activities as told by your  health care provider. Ask your health care provider what activities are safe for you.  · Do exercises as told by your health care provider.  General instructions  · Take over-the-counter and prescription medicines only as told by your health care provider.  · If you have an orthotic, use it as told by your health care provider.  · Keep all follow-up visits as told by your health care provider. This is important.  How is this prevented?  · Wear footwear that is appropriate to your athletic activity.  · Avoid athletic activities that cause pain or swelling in your ankle or foot.  · Before being active, do range-of-motion and stretching exercises.  · If you develop pain or swelling while training, stop training.  · If you have pain or swelling that does not improve after a few days of rest, see your health care provider.  · If you start a new athletic activity, start gradually so you can build up your strength and flexibility.  Contact a health care provider if:  · Your symptoms get worse.  · Your symptoms do not improve in 6-8 weeks.  · You develop new, unexplained symptoms.  · Your splint, boot, or cast gets damaged.  Summary  · Posterior tibial tendinitis is irritation of a tendon called the posterior tibial tendon.  · This condition is most often caused by repeated stress to the tendon (overuse injury).  · This condition causes foot pain and ankle pain. It can also lead to a flat foot.  · This condition may be treated by not doing high-impact activities, applying ice, having physical therapy, wearing orthotics, and wearing a cast, splint, or boot if needed.  This information is not intended to replace advice given to you by your health care provider. Make sure you discuss any questions you have with your health care provider.  Document Revised: 04/14/2020 Document Reviewed: 02/20/2020  Elsevier Patient Education © 2021 [employer]

## 2021-12-13 NOTE — PROGRESS NOTES
Answers for HPI/ROS submitted by the patient on 12/11/2021  Please describe your symptoms.: Left heel hurting on the bottom and inside of ankle is swollen and hurting.  Have you had these symptoms before?: No  How long have you been having these symptoms?: 1-4 days  Please list any medications you are currently taking for this condition.: Tylenol - cannot take anti inflammatories., Also wearing an ankle brace.  Please describe any probable cause for these symptoms. : Had excessive walking the previous week, so I think it is tendonitis.  What is the primary reason for your visit?: Other    Patient Name: Jessica Ames   YOB: 1945  Referring Primary Care Physician: Hortencia Jackman PA-C  BMI: Body mass index is 44.26 kg/m².    Chief Complaint:    Chief Complaint   Patient presents with   • Left Ankle - Initial Evaluation        HPI: Pt here for pain in ankle after walking a lot while her  was in the hospital. Pain is in the back of ankle and swelling.     Jessica Ames is a 76 y.o. female who presents today for evaluation of   Chief Complaint   Patient presents with   • Left Ankle - Initial Evaluation         Subjective   Medications:   Home Medications:  Current Outpatient Medications on File Prior to Visit   Medication Sig   • aspirin 81 MG EC tablet Take 81 mg by mouth Daily.   • Cholecalciferol 2000 units capsule Take 2,000 Units by mouth Daily. One PO daily   • Cyanocobalamin (VITAMIN B-12) 1000 MCG sublingual tablet Place 1,000 mcg under the tongue Daily. One SL daily   • ezetimibe (ZETIA) 10 MG tablet TAKE 1 TABLET DAILY FOR CHOLESTEROL   • levothyroxine (SYNTHROID, LEVOTHROID) 50 MCG tablet TAKE 1 TABLET DAILY FOR THYROID   • losartan (COZAAR) 50 MG tablet TAKE 1 TABLET DAILY FOR BLOOD PRESSURE (NEW DOSE)(NEED APPOINTMENT)   • metoprolol succinate XL (TOPROL-XL) 25 MG 24 hr tablet 1 PO once daily for heart   • omeprazole (priLOSEC) 40 MG capsule TAKE 1 CAPSULE TWICE A DAY FOR GERD   •  risedronate (ACTONEL) 150 MG tablet Take 150 mg by mouth Every 30 (Thirty) Days.   • sertraline (Zoloft) 50 MG tablet one PO daily for stress   • cyclobenzaprine (FLEXERIL) 5 MG tablet Take 1 tablet by mouth 3 (Three) Times a Day As Needed for Muscle Spasms for up to 10 days. Avoid with driving. Do not use alcohol with this prescription     No current facility-administered medications on file prior to visit.     Current Medications:  Scheduled Meds:  Continuous Infusions:No current facility-administered medications for this visit.    PRN Meds:.    I have reviewed the patient's medical history in detail and updated the computerized patient record.  Review and summarization of old records includes:    Past Medical History:   Diagnosis Date   • Disease of thyroid gland    • DVT (deep venous thrombosis) ([facility]) 2013    right leg; s/p knee replacement; was on xarelto and now baby aspirin; followed by Dr. Parminder Mejia     • Factor V Leiden ([facility])    • First degree AV block 12/7/2012   • GERD (gastroesophageal reflux disease) 12/7/2012   • Hiatal hernia    • Hyperlipidemia    • Hypertension    • IFG (impaired fasting glucose)    • Iron deficiency anemia     sees Dr. Clem Bowman    • Left anterior fascicular block 12/7/2012   • Lower extremity edema    • Obesity 12/7/2012   • ERIC (obstructive sleep apnea)     compliant with CPAP machine    • PAF (paroxysmal atrial fibrillation) ([facility]) 11/2015    day after colonoscopy went into atrial fibrillation; was on Xarelto and now baby aspirin    • Pulmonary embolism ([facility]) 2013    also had DVT    • PVC's (premature ventricular contractions) 12/7/2012   • Venous insufficiency     followed by Dr. Parminder Mejia         Past Surgical History:   Procedure Laterality Date   • BREAST SURGERY      reduction   • CATH LAB PROCEDURE     • HAND SURGERY     • HYSTERECTOMY     • ROTATOR CUFF REPAIR     • SHOULDER SURGERY     • TOTAL KNEE ARTHROPLASTY          Social History     Occupational History  "  • Not on file   Tobacco Use   • Smoking status: Never Smoker   • Smokeless tobacco: Never Used   • Tobacco comment: minimal caffeine   Vaping Use   • Vaping Use: Never used   Substance and Sexual Activity   • Alcohol use: Yes     Comment: \" once month\"   • Drug use: No   • Sexual activity: Defer      Social History     Social History Narrative   • Not on file        Family History   Problem Relation Age of Onset   • Stroke Mother    • Diabetes Mother    • Hypertension Mother    • Uterine cancer Mother    • Hypertension Father    • Heart attack Father    • Emphysema Father    • Diabetes Sister    • Hypertension Sister    • Stroke Brother    • Diabetes Brother    • Hypertension Brother        ROS: 14 point review of systems was performed and all other systems were reviewed and are negative except for documented findings in HPI and today's encounter.     Allergies:   Allergies   Allergen Reactions   • Adenosine Other (See Comments)     Paralyzed lungs and vocal chords   • Lisinopril Cough   • Celecoxib Palpitations     LE EDEMA   • Naproxen Palpitations     LE EDEMA   • Risedronate Palpitations   • Risedronate Sodium Palpitations     LE EDEMA   • Sulfa Antibiotics Hives     Constitutional:  Denies fever, shaking or chills   Eyes:  Denies change in visual acuity   HENT:  Denies nasal congestion or sore throat   Respiratory:  Denies cough or shortness of breath   Cardiovascular:  Denies chest pain or severe LE edema   GI:  Denies abdominal pain, nausea, vomiting, bloody stools or diarrhea   Musculoskeletal:  Numbness, tingling, pain, or loss of motor function only as noted above in history of present illness.  : Denies painful urination or hematuria  Integument:  Denies rash, lesion or ulceration   Neurologic:  Denies headache or focal weakness  Endocrine:  Denies lymphadenopathy  Psych:  Denies confusion or change in mental status   Hem:  Denies active bleeding    OBJECTIVE:  Physical Exam: 76 y.o. female  Wt " "Readings from Last 3 Encounters:   12/13/21 121 kg (266 lb)   11/09/21 121 kg (266 lb)   10/13/21 118 kg (260 lb)     Ht Readings from Last 1 Encounters:   12/13/21 165.1 cm (65\")     Body mass index is 44.26 kg/m².  Vitals:    12/13/21 0953   Temp: 98.6 °F (37 °C)     Vital signs reviewed.     General Appearance:    Alert, cooperative, in no acute distress                  Eyes: conjunctiva clear  ENT: external ears and nose atraumatic  CV: no peripheral edema  Resp: normal respiratory effort  Skin: no rashes or wounds; normal turgor  Psych: mood and affect appropriate  Lymph: no nodes appreciated  Neuro: gross sensation intact  Vascular:  Palpable peripheral pulse in noted extremity  Musculoskeletal Extremities: Skin is warm dry intact with good pulses mood sensation she is point tender at posterior tibial tendinitis Achilles appears to be intact the anterior talofibular area is nontender the base of fifth metatarsal is nontender there is no angulation or deformity cap refill is intact    Radiology:   Left ankle 3 views done with no readily available, parison is no fracture    Assessment:     ICD-10-CM ICD-9-CM   1. Pain  R52 780.96   2. Acute left ankle pain  M25.572 719.47   3. Posterior tibial tendinitis of left lower extremity  M76.822 726.72        Procedures    Cam walking boot and stretches physical therapy rest ice elevation    MDM/Plan:   The diagnosis(es), natural history, pathophysiology and treatment for diagnosis(es) were discussed. Opportunity given and questions answered.  Biomechanics of pertinent body areas discussed.  When appropriate, the use of ambulatory aids discussed.  BMI:  The concept of BMI body mass index and its importance and implications discussed.    EXERCISES:  Advice on benefits of, and types of regular/moderate exercise pertaining to orthopedic diagnosis(es).  MEDICATIONS:  The risks, benefits, warnings,side effects and alternatives of medications discussed.  Inflammation/pain " control; with cold, heat, elevation and/or liniments discussed as appropriate      12/13/2021    Much of this encounter note is an electronic transcription/translation of spoken language to printed text. The electronic translation of spoken language may permit erroneous, or at times, nonsensical words or phrases to be inadvertently transcribed; Although I have reviewed the note for such errors, some may still exist

## 2021-12-22 RX ORDER — RISEDRONATE SODIUM 150 MG/1
TABLET, FILM COATED ORAL
Qty: 3 TABLET | Refills: 3 | Status: SHIPPED | OUTPATIENT
Start: 2021-12-22 | End: 2022-07-11 | Stop reason: SDUPTHER

## 2021-12-22 RX ORDER — OMEPRAZOLE 40 MG/1
CAPSULE, DELAYED RELEASE ORAL
Qty: 180 CAPSULE | Refills: 3 | Status: SHIPPED | OUTPATIENT
Start: 2021-12-22 | End: 2022-08-06 | Stop reason: SDUPTHER

## 2021-12-22 RX ORDER — LEVOTHYROXINE SODIUM 0.05 MG/1
TABLET ORAL
Qty: 90 TABLET | Refills: 3 | Status: SHIPPED | OUTPATIENT
Start: 2021-12-22 | End: 2022-08-06 | Stop reason: SDUPTHER

## 2022-01-10 ENCOUNTER — APPOINTMENT (OUTPATIENT)
Dept: SLEEP MEDICINE | Facility: HOSPITAL | Age: 77
End: 2022-01-10

## 2022-01-31 ENCOUNTER — OFFICE VISIT (OUTPATIENT)
Dept: SLEEP MEDICINE | Facility: HOSPITAL | Age: 77
End: 2022-01-31

## 2022-01-31 VITALS
DIASTOLIC BLOOD PRESSURE: 60 MMHG | HEART RATE: 57 BPM | SYSTOLIC BLOOD PRESSURE: 140 MMHG | BODY MASS INDEX: 42.82 KG/M2 | OXYGEN SATURATION: 96 % | HEIGHT: 65 IN | WEIGHT: 257 LBS

## 2022-01-31 DIAGNOSIS — Z99.89 OSA ON CPAP: Primary | ICD-10-CM

## 2022-01-31 DIAGNOSIS — G47.33 OSA ON CPAP: Primary | ICD-10-CM

## 2022-01-31 PROCEDURE — G0463 HOSPITAL OUTPT CLINIC VISIT: HCPCS

## 2022-01-31 NOTE — PROGRESS NOTES
"[facility]         Dr Jazmin Murdock  [unfilled]  Patient Care Team:  Hortencia Jackman PA-C as PCP - General  Hortencia Jackman PA-C as PCP - Family Medicine  Clem Bowman MD as Consulting Physician (Hematology and Oncology)  Anirudh Adame MD as Consulting Physician (Cardiology)  Luke Hwang MD as Consulting Physician (Otolaryngology)  Yoseph Mars MD as Consulting Physician (Pulmonary Disease)  Fawad Rowland MD as Consulting Physician (Ophthalmology)  Jr Matthews MD as Consulting Physician (Hand Surgery)  Donavon Osuna MD as Consulting Physician (Gastroenterology)  Jazmin Murdock MD as Consulting Physician (Pulmonary Disease)  Parminder Mejia Jr., MD as Consulting Physician (Vascular Surgery)    Chief Complaint:ERIC with hypothyroidism, A. fib, hypertension    Interval History: Patient here for annual compliance visit.  As you recall currently on auto CPAP 5 to 12 cm.  Compliance 100% average daily use of 7 hours 18 minutes.  AHI and leak look excellent.  Patient she is benefiting from CPAP.  Goes to bed 1130 gets up 630 gets about 6 hours of sleep and feels rested.  No tobacco no alcohol no caffeine abuse.  Currently has a nasal pillow that fits well.  She is happy with her current mask and supplies have been adequate.    REVIEW OF SYSTEMS:   CARDIOVASCULAR: No chest pain, chest pressure or chest discomfort. No palpitations or edema.   RESPIRATORY: No shortness of breath, cough or sputum.   GASTROINTESTINAL: No anorexia, nausea, vomiting or diarrhea. No abdominal pain or blood.   HEMATOLOGIC: No bleeding or bruising.  Positive for postnasal drainage anxiety  [place] 4 out of 24 within normal limits    Ventilator/Non-Invasive Ventilation Settings (From admission, onward)            None            Vital Signs  Heart Rate:  [57] 57  BP: (140)/(60) 140/60  [unfilled]  Flowsheet Rows      First Filed Value   Admission Height 165.1 cm (65\") Documented at 01/31/2022 1100 "   Admission Weight 117 kg (257 lb) Documented at 01/31/2022 1100          Physical Exam:  Patient is examined using the personal protective equipment as per guidelines from infection control for this particular patient as enacted.  Hand hygiene was performed before and after patient interaction.   General Appearance:    Alert, cooperative, in no acute distress.  Following simple commands  Neck midline trachea, no thyromegaly   Lungs:     Clear to auscultation, respirations regular, even and                  unlabored  ENT Mallampati between 3 and 4 no nasal congestion    Heart:    Regular rhythm and normal rate, normal S1 and S2, no            murmur, no gallop, no rub, no click   Chest Wall:    No abnormalities observed   Abdomen:     Normal bowel sounds, no masses, no organomegaly, soft        nontender, nondistended, no guarding, no rebound                tenderness   Extremities:   Moves all extremities well, no edema, no cyanosis, no             redness  CNS no focal neurological deficits normal sensory exam  Skin no rashes no nodules  Musculoskeletal no cyanosis no clubbing normal range of motion     Results Review:                                          I reviewed the patient's new clinical results.  I personally viewed and interpreted the patient's chest x-ray.        Medication Review:       No current facility-administered medications for this visit.      ASSESSMENT:   ERIC on CPAP  Hypothyroidism  A. fib  Hypertension    PLAN:  Reviewed compliance download  Excellent compliance AHI leak  Continue current auto CPAP 5 to 12 cm  Sleep hygiene measures  Supplies be renewed  Weight loss encouraged  Treatment of underlying comorbidities  Correlation between ERIC and underlying comorbidities specially A. fib was discussed and explained  I will follow-up in 1 year        Jazmin Murdock MD  01/31/22  12:03 EST

## 2022-03-10 ENCOUNTER — OFFICE VISIT (OUTPATIENT)
Dept: ORTHOPEDIC SURGERY | Facility: CLINIC | Age: 77
End: 2022-03-10

## 2022-03-10 VITALS — TEMPERATURE: 97.1 F | BODY MASS INDEX: 43.65 KG/M2 | WEIGHT: 262 LBS | HEIGHT: 65 IN

## 2022-03-10 DIAGNOSIS — M25.551 RIGHT HIP PAIN: Primary | ICD-10-CM

## 2022-03-10 DIAGNOSIS — M51.36 LUMBAR DEGENERATIVE DISC DISEASE: ICD-10-CM

## 2022-03-10 DIAGNOSIS — M70.61 GREATER TROCHANTERIC BURSITIS OF RIGHT HIP: ICD-10-CM

## 2022-03-10 PROCEDURE — 99214 OFFICE O/P EST MOD 30 MIN: CPT | Performed by: NURSE PRACTITIONER

## 2022-03-10 PROCEDURE — 20610 DRAIN/INJ JOINT/BURSA W/O US: CPT | Performed by: NURSE PRACTITIONER

## 2022-03-10 PROCEDURE — 72100 X-RAY EXAM L-S SPINE 2/3 VWS: CPT | Performed by: NURSE PRACTITIONER

## 2022-03-10 PROCEDURE — 73502 X-RAY EXAM HIP UNI 2-3 VIEWS: CPT | Performed by: NURSE PRACTITIONER

## 2022-03-10 RX ORDER — METHYLPREDNISOLONE ACETATE 80 MG/ML
80 INJECTION, SUSPENSION INTRA-ARTICULAR; INTRALESIONAL; INTRAMUSCULAR; SOFT TISSUE
Status: COMPLETED | OUTPATIENT
Start: 2022-03-10 | End: 2022-03-10

## 2022-03-10 RX ORDER — METHYLPREDNISOLONE 4 MG/1
TABLET ORAL
Qty: 21 TABLET | Refills: 0 | Status: SHIPPED | OUTPATIENT
Start: 2022-03-10 | End: 2022-05-27

## 2022-03-10 RX ADMIN — METHYLPREDNISOLONE ACETATE 80 MG: 80 INJECTION, SUSPENSION INTRA-ARTICULAR; INTRALESIONAL; INTRAMUSCULAR; SOFT TISSUE at 09:00

## 2022-03-10 NOTE — PROGRESS NOTES
Patient Name: Jessica Ames   YOB: 1945  Referring Primary Care Physician: Hortencia Jackman PA-C  BMI: Body mass index is 43.6 kg/m².    Chief Complaint:    Chief Complaint   Patient presents with   • Right Hip - Pain        HPI: Pt here for right hip pain - hurts on the outside of the hip and has dull ache down her leg. Pain has been going on 1 week - no injury or trauma. Pt has low back problems and has been packing for a move. Pt has a hx of sciatica and was concerned about it flaring up.     Jessica Ames is a 76 y.o. female who presents today for evaluation of   Chief Complaint   Patient presents with   • Right Hip - Pain         Subjective   Medications:   Home Medications:  Current Outpatient Medications on File Prior to Visit   Medication Sig   • Acetaminophen (TYLENOL PO) Take  by mouth Daily As Needed.   • aspirin 81 MG EC tablet Take 81 mg by mouth Daily.   • Cholecalciferol 2000 units capsule Take 2,000 Units by mouth Daily. One PO daily   • Cyanocobalamin (VITAMIN B-12) 1000 MCG sublingual tablet Place 1,000 mcg under the tongue Daily. One SL daily   • ezetimibe (ZETIA) 10 MG tablet TAKE 1 TABLET DAILY FOR CHOLESTEROL   • levothyroxine (SYNTHROID, LEVOTHROID) 50 MCG tablet TAKE 1 TABLET DAILY FOR THYROID   • losartan (COZAAR) 50 MG tablet TAKE 1 TABLET DAILY FOR BLOOD PRESSURE (NEW DOSE)(NEED APPOINTMENT)   • metoprolol succinate XL (TOPROL-XL) 25 MG 24 hr tablet 1 PO once daily for heart   • omeprazole (priLOSEC) 40 MG capsule TAKE 1 CAPSULE TWICE A DAY FOR GERD   • risedronate (ACTONEL) 150 MG tablet TAKE 1 TABLET EVERY 30 DAYS WITH WATER ON EMPTY STOMACH, NOTHING BY MOUTH OR LIE DOWN FOR NEXT 30 MINUTES FOR OSTEOPENIA   • sertraline (Zoloft) 50 MG tablet one PO daily for stress   • cyclobenzaprine (FLEXERIL) 5 MG tablet Take 1 tablet by mouth 3 (Three) Times a Day As Needed for Muscle Spasms for up to 10 days. Avoid with driving. Do not use alcohol with this prescription     No current  "facility-administered medications on file prior to visit.     Current Medications:  Scheduled Meds:  Continuous Infusions:No current facility-administered medications for this visit.    PRN Meds:.    I have reviewed the patient's medical history in detail and updated the computerized patient record.  Review and summarization of old records includes:    Past Medical History:   Diagnosis Date   • Disease of thyroid gland    • DVT (deep venous thrombosis) ([facility]) 2013    right leg; s/p knee replacement; was on xarelto and now baby aspirin; followed by Dr. Parminder Mejia     • Factor V Leiden ([facility])    • First degree AV block 12/7/2012   • GERD (gastroesophageal reflux disease) 12/7/2012   • Hiatal hernia    • Hyperlipidemia    • Hypertension    • IFG (impaired fasting glucose)    • Iron deficiency anemia     sees Dr. Clem Bowman    • Left anterior fascicular block 12/7/2012   • Lower extremity edema    • Obesity 12/7/2012   • ERIC (obstructive sleep apnea)     compliant with CPAP machine    • PAF (paroxysmal atrial fibrillation) ([facility]) 11/2015    day after colonoscopy went into atrial fibrillation; was on Xarelto and now baby aspirin    • Pulmonary embolism ([facility]) 2013    also had DVT    • PVC's (premature ventricular contractions) 12/7/2012   • Venous insufficiency     followed by Dr. Parminder Mejia         Past Surgical History:   Procedure Laterality Date   • BREAST SURGERY      reduction   • CATH LAB PROCEDURE     • HAND SURGERY     • HYSTERECTOMY     • ROTATOR CUFF REPAIR     • SHOULDER SURGERY     • TOTAL KNEE ARTHROPLASTY          Social History     Occupational History   • Not on file   Tobacco Use   • Smoking status: Never Smoker   • Smokeless tobacco: Never Used   • Tobacco comment: minimal caffeine   Vaping Use   • Vaping Use: Never used   Substance and Sexual Activity   • Alcohol use: Yes     Comment: \" once month\"   • Drug use: No   • Sexual activity: Defer      Social History     Social History Narrative   • " "Not on file        Family History   Problem Relation Age of Onset   • Stroke Mother    • Diabetes Mother    • Hypertension Mother    • Uterine cancer Mother    • Hypertension Father    • Heart attack Father    • Emphysema Father    • Diabetes Sister    • Hypertension Sister    • Stroke Brother    • Diabetes Brother    • Hypertension Brother        ROS: 14 point review of systems was performed and all other systems were reviewed and are negative except for documented findings in HPI and today's encounter.     Allergies:   Allergies   Allergen Reactions   • Adenosine Other (See Comments)     Paralyzed lungs and vocal chords   • Lisinopril Cough   • Celecoxib Palpitations     LE EDEMA   • Naproxen Palpitations     LE EDEMA   • Risedronate Palpitations   • Risedronate Sodium Palpitations     LE EDEMA   • Sulfa Antibiotics Hives     Constitutional:  Denies fever, shaking or chills   Eyes:  Denies change in visual acuity   HENT:  Denies nasal congestion or sore throat   Respiratory:  Denies cough or shortness of breath   Cardiovascular:  Denies chest pain or severe LE edema   GI:  Denies abdominal pain, nausea, vomiting, bloody stools or diarrhea   Musculoskeletal:  Numbness, tingling, pain, or loss of motor function only as noted above in history of present illness.  : Denies painful urination or hematuria  Integument:  Denies rash, lesion or ulceration   Neurologic:  Denies headache or focal weakness  Endocrine:  Denies lymphadenopathy  Psych:  Denies confusion or change in mental status   Hem:  Denies active bleeding    OBJECTIVE:  Physical Exam: 76 y.o. female  Wt Readings from Last 3 Encounters:   03/10/22 119 kg (262 lb)   01/31/22 117 kg (257 lb)   12/13/21 121 kg (266 lb)     Ht Readings from Last 1 Encounters:   03/10/22 165.1 cm (65\")     Body mass index is 43.6 kg/m².  Vitals:    03/10/22 0837   Temp: 97.1 °F (36.2 °C)     Vital signs reviewed.     General Appearance:    Alert, cooperative, in no acute " distress                  Eyes: conjunctiva clear  ENT: external ears and nose atraumatic  CV: no peripheral edema  Resp: normal respiratory effort  Skin: no rashes or wounds; normal turgor  Psych: mood and affect appropriate  Lymph: no nodes appreciated  Neuro: gross sensation intact  Vascular:  Palpable peripheral pulse in noted extremity  Musculoskeletal Extremities: Skin warm dry intact with good pulses movement and sensation point tenderness to greater trochanter bursa no pain with internal rotation of the hip diffuse low back pain and calves are soft and nontender bilaterally skin is warm dry intact good pulses mood sensation ambulates with a slight antalgic gait    Radiology:   Right hip 2 views done for pain with no comparison views degenerative changes   Lumbar spine 2 views done pain with no comparison views degenerative changes andspurring        Assessment:     ICD-10-CM ICD-9-CM   1. Right hip pain  M25.551 719.45   2. Lumbar degenerative disc disease  M51.36 722.52   3. Greater trochanteric bursitis of right hip  M70.61 726.5        MDM/Plan:   The diagnosis(es), natural history, pathophysiology and treatment for diagnosis(es) were discussed. Opportunity given and questions answered.  Biomechanics of pertinent body areas discussed.  When appropriate, the use of ambulatory aids discussed.    Large Joint Arthrocentesis: R greater trochanteric bursa  Date/Time: 3/10/2022 9:00 AM  Consent given by: patient  Site marked: site marked  Timeout: Immediately prior to procedure a time out was called to verify the correct patient, procedure, equipment, support staff and site/side marked as required   Supporting Documentation  Indications: pain   Procedure Details  Location: hip - R greater trochanteric bursa  Preparation: Patient was prepped and draped in the usual sterile fashion  Needle gauge: 21 G.  Approach: lateral  Medications administered: 2 mL lidocaine (cardiac); 80 mg methylPREDNISolone acetate 80  MG/ML  Patient tolerance: patient tolerated the procedure well with no immediate complications          The diagnosis(es), natural history, pathophysiology and treatment for diagnosis(es) were discussed. Opportunity given and questions answered.  Biomechanics of pertinent body areas discussed.  When appropriate, the use of ambulatory aids discussed.  BMI:  The concept of BMI body mass index and its importance and implications discussed.    EXERCISES:  Advice on benefits of, and types of regular/moderate exercise pertaining to orthopedic diagnosis(es).  Inflammation/pain control; with cold, heat, elevation and/or liniments discussed as appropriate  Cortisone Injection. See procedure note.  HOME EXERCISE/PT program encouraged  MEDICAL RECORDS reviewed from other provider(s) for past and current medical history pertinent to this complaint.      3/10/2022    Dictated utilizing Dragon dictation

## 2022-04-19 ENCOUNTER — APPOINTMENT (OUTPATIENT)
Dept: MRI IMAGING | Facility: HOSPITAL | Age: 77
End: 2022-04-19

## 2022-05-02 ENCOUNTER — TELEPHONE (OUTPATIENT)
Dept: ORTHOPEDIC SURGERY | Facility: CLINIC | Age: 77
End: 2022-05-02

## 2022-05-02 ENCOUNTER — HOSPITAL ENCOUNTER (OUTPATIENT)
Dept: MRI IMAGING | Facility: HOSPITAL | Age: 77
Discharge: HOME | End: 2022-05-02
Admitting: NURSE PRACTITIONER

## 2022-05-02 DIAGNOSIS — M51.36 LUMBAR DEGENERATIVE DISC DISEASE: ICD-10-CM

## 2022-05-02 PROCEDURE — 72148 MRI LUMBAR SPINE W/O DYE: CPT

## 2022-05-02 NOTE — TELEPHONE ENCOUNTER
----- Message from MIRA Vicente sent at 5/2/2022  2:33 PM EDT -----  No disc herniation - would do pain management and physical therapy  ----- Message -----  From: Interface, [facility] In  Sent: 5/2/2022   1:25 PM EDT  To: MIRA Vicente

## 2022-05-02 NOTE — TELEPHONE ENCOUNTER
Pt informed of results and recommendations.  She stated that she is feeling better and will call back if she would like to try PT or pain management.

## 2022-05-25 NOTE — PROGRESS NOTES
Date of Office Visit: 2022  Encounter Provider: MIRA Johnson  Place of Service: [facility] CARDIOLOG[facility]  Patient Name: Jessica Ames  :1945    Chief Complaint   Patient presents with   • Atrial Fibrillation   :     HPI: Jessica Ames is a 76 y.o. female who is a patient of Dr. Adame.  She is new to me today and presents for six month follow up.  She has a history of hypertension as well as paroxysmal atrial fibrillation.  She was last seen by Dr. Adame on 2021 in office and was having some infrequent palpitations.  Also has some chronic fatigue issues.    48 hour Holter monitor on 2021 showed no atrial fibrillation, rare PVCs and occasional PACs with no pauses or high degree AV block.  48-hour Holter monitor 10/22/2020 also did not show atrial fibrillation. Holter monitor 10/22/2019 showed underlying sinus rhythm and no runs of atrial fibrillation or ventricular tachycardia.    Lexiscan stress test 2018 showed small mild reversible defect in basal anterior wall only, LVEF greater than 70%, enlarged LV cavity size. Stress 2018 showed atyical vs incomplete LBBB on stress ECG.  LAFB on 2012 EKG.    Today patient presents with no complaints.  She states that she has an episode of A. fib approximately once every 3 months.  She can tell because she gets more fatigued and her watch shows heart rates 120s to 160s.  She is not on any anticoagulation.  As above, she has worn Holter and event monitors on several occasions which have not caught any episodes of atrial fibrillation.  She was found to have extensive DVT which advanced to a pulmonary embolus after her knee replacement in 2013.  She took Xarelto for approximately 6 months following.  She was placed on prophylactic Xarelto when she underwent her other knee replacement the following year.  She has not had any chest pain or shortness of breath.  Her blood pressure appears well  "controlled and she monitors intermittently at home.  EKG is stable and shows normal sinus rhythm with multiple PACs.    Previous testing and notes have been reviewed by me.   Past Medical History:   Diagnosis Date   • Disease of thyroid gland    • DVT (deep venous thrombosis) ([facility]) 2013    right leg; s/p knee replacement; was on xarelto and now baby aspirin; followed by Dr. Parminder Mejia     • Factor V Leiden ([facility])    • First degree AV block 12/7/2012   • GERD (gastroesophageal reflux disease) 12/7/2012   • Hiatal hernia    • Hyperlipidemia    • Hypertension    • IFG (impaired fasting glucose)    • Iron deficiency anemia     sees Dr. Clem Bowman    • Left anterior fascicular block 12/7/2012   • Lower extremity edema    • Obesity 12/7/2012   • ERIC (obstructive sleep apnea)     compliant with CPAP machine    • PAF (paroxysmal atrial fibrillation) ([facility]) 11/2015    day after colonoscopy went into atrial fibrillation; was on Xarelto and now baby aspirin    • Pulmonary embolism ([facility]) 2013    also had DVT    • PVC's (premature ventricular contractions) 12/7/2012   • Venous insufficiency     followed by Dr. Parminder Mejia        Past Surgical History:   Procedure Laterality Date   • BREAST SURGERY      reduction   • CATH LAB PROCEDURE     • HAND SURGERY     • HYSTERECTOMY     • ROTATOR CUFF REPAIR     • SHOULDER SURGERY     • TOTAL KNEE ARTHROPLASTY         Social History     Socioeconomic History   • Marital status:    Tobacco Use   • Smoking status: Never Smoker   • Smokeless tobacco: Never Used   • Tobacco comment: minimal caffeine   Vaping Use   • Vaping Use: Never used   Substance and Sexual Activity   • Alcohol use: Yes     Comment: \" once month\"   • Drug use: No   • Sexual activity: Defer       Family History   Problem Relation Age of Onset   • Stroke Mother    • Diabetes Mother    • Hypertension Mother    • Uterine cancer Mother    • Hypertension Father    • Heart attack Father    • Emphysema Father    • " Diabetes Sister    • Hypertension Sister    • Stroke Brother    • Diabetes Brother    • Hypertension Brother        Review of Systems   Constitutional: Negative.   HENT: Negative.    Eyes: Negative.    Cardiovascular: Negative.    Respiratory: Negative.    Endocrine: Negative.    Hematologic/Lymphatic: Negative.    Skin: Negative.    Musculoskeletal: Negative.    Gastrointestinal: Negative.    Genitourinary: Negative.    Neurological: Negative.    Psychiatric/Behavioral: Negative.    Allergic/Immunologic: Negative.        Allergies   Allergen Reactions   • Adenosine Other (See Comments)     Paralyzed lungs and vocal chords   • Lisinopril Cough   • Celecoxib Palpitations     LE EDEMA   • Naproxen Palpitations     LE EDEMA   • Risedronate Palpitations   • Risedronate Sodium Palpitations     LE EDEMA   • Sulfa Antibiotics Hives         Current Outpatient Medications:   •  Acetaminophen (TYLENOL PO), Take  by mouth Daily As Needed., Disp: , Rfl:   •  aspirin 81 MG EC tablet, Take 81 mg by mouth Daily., Disp: , Rfl:   •  Cholecalciferol 2000 units capsule, Take 2,000 Units by mouth Daily. One PO daily, Disp: 90 each, Rfl: 3  •  Cyanocobalamin (VITAMIN B-12) 1000 MCG sublingual tablet, Place 1,000 mcg under the tongue Daily. One SL daily, Disp: 90 each, Rfl: 3  •  ezetimibe (ZETIA) 10 MG tablet, TAKE 1 TABLET DAILY FOR CHOLESTEROL, Disp: 90 tablet, Rfl: 3  •  levothyroxine (SYNTHROID, LEVOTHROID) 50 MCG tablet, TAKE 1 TABLET DAILY FOR THYROID, Disp: 90 tablet, Rfl: 3  •  losartan (COZAAR) 50 MG tablet, TAKE 1 TABLET DAILY FOR BLOOD PRESSURE (NEW DOSE)(NEED APPOINTMENT), Disp: 90 tablet, Rfl: 1  •  metoprolol succinate XL (TOPROL-XL) 25 MG 24 hr tablet, 1 PO once daily for heart, Disp: 90 tablet, Rfl: 3  •  omeprazole (priLOSEC) 40 MG capsule, TAKE 1 CAPSULE TWICE A DAY FOR GERD, Disp: 180 capsule, Rfl: 3  •  risedronate (ACTONEL) 150 MG tablet, TAKE 1 TABLET EVERY 30 DAYS WITH WATER ON EMPTY STOMACH, NOTHING BY MOUTH OR  "LIE DOWN FOR NEXT 30 MINUTES FOR OSTEOPENIA, Disp: 3 tablet, Rfl: 3  •  sertraline (Zoloft) 50 MG tablet, one PO daily for stress, Disp: 90 tablet, Rfl: 3      Objective:     Vitals:    05/27/22 1009   BP: 138/70   BP Location: Right arm   Pulse: 58   Weight: 120 kg (264 lb)   Height: 165.1 cm (65\")     Body mass index is 43.93 kg/m².    PHYSICAL EXAM:    Constitutional:       Appearance: Healthy appearance. Not in distress.   Neck:      Vascular: No JVR. JVD normal.   Pulmonary:      Effort: Pulmonary effort is normal.      Breath sounds: Normal breath sounds. No wheezing. No rhonchi. No rales.   Chest:      Chest wall: Not tender to palpatation.   Cardiovascular:      PMI at left midclavicular line. Normal rate. Regular rhythm. Normal S1. Normal S2.      Murmurs: There is no murmur.      No gallop. No click. No rub.      Comments: Generalized bilateral lower extremity edema.  Normally wears compression hose  Pulses:     Intact distal pulses.   Edema:     Peripheral edema present.  Abdominal:      General: Bowel sounds are normal.      Palpations: Abdomen is soft.      Tenderness: There is no abdominal tenderness.   Musculoskeletal: Normal range of motion.         General: No tenderness. Skin:     General: Skin is warm and dry.   Neurological:      General: No focal deficit present.      Mental Status: Alert and oriented to person, place and time.           ECG 12 Lead    Date/Time: 5/27/2022 11:07 AM  Performed by: Denisha Engle APRN  Authorized by: Denisha Engle APRN   Comparison: compared with previous ECG from 9/8/2021  Similar to previous ECG  Rhythm: sinus rhythm  Ectopy: atrial premature contractions  Rate: normal  BPM: 58  Conduction: left anterior fascicular block              Assessment:       Diagnosis Plan   1. Paroxysmal atrial fibrillation (HCC)     2. PVC's (premature ventricular contractions)     3. Left anterior fascicular block     4. Primary hypertension     5. Mixed hyperlipidemia   "   6. History of pulmonary embolism       No orders of the defined types were placed in this encounter.         Plan:       1.  Paroxysmal atrial fibrillation: No atrial fibrillation detected over the last 4 years on 30 day event and 48 hour holter monitors.  Patient notes that she has episodes of atrial fibrillation about once every 3 months as she feels fatigued and her heart rate shows rate between 120 and 160.  On beta-blocker.  Anticoagulation at this time.  Instructed patient to try Valsalva maneuver when she notices her heart rate is high.  2.  Hypertension: Well-controlled on current medication  3.  Anxiety: On last visit patient had just found out her brother passed away of COVID-19.  She is now on antidepressant and feels much better.  4. History of DVT/ PE : following knee replacement in 2013.  Wears compression hose    Ms. Ames will follow up with Dr. Lo in 6 months.  She will call sooner for any questions or concerns.         Your medication list          Accurate as of May 27, 2022 11:06 AM. If you have any questions, ask your nurse or doctor.            CONTINUE taking these medications      Instructions Last Dose Given Next Dose Due   aspirin 81 MG EC tablet      Take 81 mg by mouth Daily.       Cholecalciferol 50 MCG (2000 UT) capsule      Take 2,000 Units by mouth Daily. One PO daily       ezetimibe 10 MG tablet  Commonly known as: ZETIA      TAKE 1 TABLET DAILY FOR CHOLESTEROL       levothyroxine 50 MCG tablet  Commonly known as: SYNTHROID, LEVOTHROID      TAKE 1 TABLET DAILY FOR THYROID       losartan 50 MG tablet  Commonly known as: COZAAR      TAKE 1 TABLET DAILY FOR BLOOD PRESSURE (NEW DOSE)(NEED APPOINTMENT)       metoprolol succinate XL 25 MG 24 hr tablet  Commonly known as: TOPROL-XL      1 PO once daily for heart       omeprazole 40 MG capsule  Commonly known as: priLOSEC      TAKE 1 CAPSULE TWICE A DAY FOR GERD       risedronate 150 MG tablet  Commonly known as: ACTONEL      TAKE 1  TABLET EVERY 30 DAYS WITH WATER ON EMPTY STOMACH, NOTHING BY MOUTH OR LIE DOWN FOR NEXT 30 MINUTES FOR OSTEOPENIA       sertraline 50 MG tablet  Commonly known as: Zoloft      one PO daily for stress       TYLENOL PO      Take  by mouth Daily As Needed.       Vitamin B-12 1000 MCG sublingual tablet      Place 1,000 mcg under the tongue Daily. One SL daily                As always, it has been a pleasure to participate in your patient's care.      Sincerely,       MIRA Walsh

## 2022-05-27 ENCOUNTER — OFFICE VISIT (OUTPATIENT)
Dept: CARDIOLOGY | Facility: CLINIC | Age: 77
End: 2022-05-27

## 2022-05-27 VITALS
SYSTOLIC BLOOD PRESSURE: 138 MMHG | WEIGHT: 264 LBS | DIASTOLIC BLOOD PRESSURE: 70 MMHG | HEIGHT: 65 IN | BODY MASS INDEX: 43.99 KG/M2 | HEART RATE: 58 BPM

## 2022-05-27 DIAGNOSIS — E78.2 MIXED HYPERLIPIDEMIA: ICD-10-CM

## 2022-05-27 DIAGNOSIS — Z86.711 HISTORY OF PULMONARY EMBOLISM: ICD-10-CM

## 2022-05-27 DIAGNOSIS — I48.0 PAROXYSMAL ATRIAL FIBRILLATION: Primary | ICD-10-CM

## 2022-05-27 DIAGNOSIS — I49.3 PVC'S (PREMATURE VENTRICULAR CONTRACTIONS): ICD-10-CM

## 2022-05-27 DIAGNOSIS — I10 PRIMARY HYPERTENSION: ICD-10-CM

## 2022-05-27 DIAGNOSIS — I44.4 LEFT ANTERIOR FASCICULAR BLOCK: ICD-10-CM

## 2022-05-27 PROCEDURE — 93000 ELECTROCARDIOGRAM COMPLETE: CPT | Performed by: NURSE PRACTITIONER

## 2022-05-27 PROCEDURE — 99214 OFFICE O/P EST MOD 30 MIN: CPT | Performed by: NURSE PRACTITIONER

## 2022-07-11 ENCOUNTER — TELEPHONE (OUTPATIENT)
Dept: FAMILY MEDICINE CLINIC | Facility: CLINIC | Age: 77
End: 2022-07-11

## 2022-07-11 RX ORDER — RISEDRONATE SODIUM 150 MG/1
150 TABLET, FILM COATED ORAL
Qty: 3 TABLET | Refills: 3 | Status: SHIPPED | OUTPATIENT
Start: 2022-07-11 | End: 2022-07-15 | Stop reason: SDUPTHER

## 2022-07-11 NOTE — TELEPHONE ENCOUNTER
----- Message from Jessica Ames sent at 7/11/2022 10:53 AM EDT -----  Regarding: Actonel  Since my  retired in May,  I am now on Medicare and [employer].  Could you please send a new prescription for my Risedronate Sodium Tabs 150 mg, quantity of 3 tablets to [facility] [place] - [phone].   Thank you,  Jessica Ames

## 2022-07-15 ENCOUNTER — TELEPHONE (OUTPATIENT)
Dept: FAMILY MEDICINE CLINIC | Facility: CLINIC | Age: 77
End: 2022-07-15

## 2022-07-15 RX ORDER — RISEDRONATE SODIUM 150 MG/1
150 TABLET, FILM COATED ORAL
Qty: 3 TABLET | Refills: 3 | Status: SHIPPED | OUTPATIENT
Start: 2022-07-15 | End: 2022-08-06 | Stop reason: SDUPTHER

## 2022-07-15 NOTE — TELEPHONE ENCOUNTER
Caller: [facility] (NOW [facility]IL [facility]) - [place], [place] - [address] - [phone] [facility] [phone]    Relationship: Pharmacy    Best call back number: [phone]    What is the best time to reach you: ANY TIME    Who are you requesting to speak with (clinical staff, provider,  specific staff member): CLINICAL STAFF    What was the call regarding: PHARMACY REQUESTING WE TRANSFER PATIENT'S risedronate (ACTONEL) 150 MG tablet PRESCRIPTION TO THEM ,IT WAS ORIGINALLY SENT TO [facility].    PLEASE ADVISE    Do you require a callback: YES

## 2022-08-06 ENCOUNTER — TELEPHONE (OUTPATIENT)
Dept: FAMILY MEDICINE CLINIC | Facility: CLINIC | Age: 77
End: 2022-08-06

## 2022-08-06 DIAGNOSIS — E78.2 MIXED HYPERLIPIDEMIA: Primary | ICD-10-CM

## 2022-08-06 DIAGNOSIS — E03.9 HYPOTHYROIDISM, ACQUIRED: ICD-10-CM

## 2022-08-06 DIAGNOSIS — E55.9 VITAMIN D DEFICIENCY, UNSPECIFIED: ICD-10-CM

## 2022-08-06 DIAGNOSIS — I10 PRIMARY HYPERTENSION: ICD-10-CM

## 2022-08-06 DIAGNOSIS — E61.1 IRON DEFICIENCY: ICD-10-CM

## 2022-08-06 DIAGNOSIS — R73.01 IMPAIRED FASTING GLUCOSE: ICD-10-CM

## 2022-08-06 RX ORDER — LEVOTHYROXINE SODIUM 0.05 MG/1
50 TABLET ORAL DAILY
Qty: 90 TABLET | Refills: 3 | Status: SHIPPED | OUTPATIENT
Start: 2022-08-06 | End: 2023-03-02 | Stop reason: SDUPTHER

## 2022-08-06 RX ORDER — METOPROLOL SUCCINATE 25 MG/1
TABLET, EXTENDED RELEASE ORAL
Qty: 90 TABLET | Refills: 3 | Status: SHIPPED | OUTPATIENT
Start: 2022-08-06

## 2022-08-06 RX ORDER — RISEDRONATE SODIUM 150 MG/1
150 TABLET, FILM COATED ORAL
Qty: 3 TABLET | Refills: 3 | Status: SHIPPED | OUTPATIENT
Start: 2022-08-06 | End: 2022-10-27

## 2022-08-06 RX ORDER — OMEPRAZOLE 40 MG/1
40 CAPSULE, DELAYED RELEASE ORAL 2 TIMES DAILY
Qty: 180 CAPSULE | Refills: 3 | Status: SHIPPED | OUTPATIENT
Start: 2022-08-06

## 2022-08-06 RX ORDER — LOSARTAN POTASSIUM 50 MG/1
50 TABLET ORAL DAILY
Qty: 90 TABLET | Refills: 0 | Status: SHIPPED | OUTPATIENT
Start: 2022-08-06 | End: 2022-09-01 | Stop reason: SDUPTHER

## 2022-08-06 NOTE — TELEPHONE ENCOUNTER
----- Message from Jessica Ames sent at 8/6/2022  9:51 AM EDT -----  [facility] are still waiting for an ok from their fax.  Really need the Levothyroxine, Sertaline and Ezetimibe.  Thanks so much.  Jessica

## 2022-08-11 ENCOUNTER — TELEPHONE (OUTPATIENT)
Dept: FAMILY MEDICINE CLINIC | Facility: CLINIC | Age: 77
End: 2022-08-11

## 2022-08-11 RX ORDER — EZETIMIBE 10 MG/1
10 TABLET ORAL DAILY
Qty: 90 TABLET | Refills: 3 | Status: SHIPPED | OUTPATIENT
Start: 2022-08-11

## 2022-08-11 NOTE — TELEPHONE ENCOUNTER
----- Message from Jessica HOLLOWAYHoma Hui sent at 8/11/2022  8:43 AM EDT -----  Regarding: [facility]  I don’t think [facility] is handling these prescription transfers very well, they say they have not received the ones for the Sertraline (Zoloft) and Ezetimibe (Zetia) and I only have a few days left of each, could you please resend?  Does the office happen to have any samples of these that I could  and tide me over for a few days?  Thank you for your help,  Jessica

## 2022-08-27 LAB
25(OH)D3+25(OH)D2 SERPL-MCNC: 54.6 NG/ML (ref 30–100)
ALBUMIN SERPL-MCNC: 4.2 G/DL (ref 3.7–4.7)
ALBUMIN/GLOB SERPL: 1.8 {RATIO} (ref 1.2–2.2)
ALP SERPL-CCNC: 80 IU/L (ref 44–121)
ALT SERPL-CCNC: 16 IU/L (ref 0–32)
APPEARANCE UR: ABNORMAL
AST SERPL-CCNC: 18 IU/L (ref 0–40)
BACTERIA #/AREA URNS HPF: ABNORMAL /[HPF]
BASOPHILS # BLD AUTO: 0 X10E3/UL (ref 0–0.2)
BASOPHILS NFR BLD AUTO: 0 %
BILIRUB SERPL-MCNC: 0.5 MG/DL (ref 0–1.2)
BILIRUB UR QL STRIP: NEGATIVE
BUN SERPL-MCNC: 15 MG/DL (ref 8–27)
BUN/CREAT SERPL: 15 (ref 12–28)
CALCIUM SERPL-MCNC: 9.5 MG/DL (ref 8.7–10.3)
CASTS URNS QL MICRO: ABNORMAL /LPF
CHLORIDE SERPL-SCNC: 102 MMOL/L (ref 96–106)
CHOLEST SERPL-MCNC: 171 MG/DL (ref 100–199)
CO2 SERPL-SCNC: 20 MMOL/L (ref 20–29)
COLOR UR: YELLOW
CREAT SERPL-MCNC: 1 MG/DL (ref 0.57–1)
EGFRCR-CYS SERPLBLD CKD-EPI 2021: 58 ML/MIN/1.73
EOSINOPHIL # BLD AUTO: 0 X10E3/UL (ref 0–0.4)
EOSINOPHIL NFR BLD AUTO: 0 %
EPI CELLS #/AREA URNS HPF: >10 /HPF (ref 0–10)
ERYTHROCYTE [DISTWIDTH] IN BLOOD BY AUTOMATED COUNT: 14 % (ref 11.7–15.4)
FERRITIN SERPL-MCNC: 77 NG/ML (ref 15–150)
FOLATE SERPL-MCNC: 14.3 NG/ML
GLOBULIN SER CALC-MCNC: 2.3 G/DL (ref 1.5–4.5)
GLUCOSE SERPL-MCNC: 118 MG/DL (ref 65–99)
GLUCOSE UR QL STRIP: NEGATIVE
HBA1C MFR BLD: 6.6 % (ref 4.8–5.6)
HCT VFR BLD AUTO: 40.1 % (ref 34–46.6)
HDLC SERPL-MCNC: 37 MG/DL
HGB BLD-MCNC: 13.3 G/DL (ref 11.1–15.9)
HGB UR QL STRIP: ABNORMAL
IMM GRANULOCYTES # BLD AUTO: 0.1 X10E3/UL (ref 0–0.1)
IMM GRANULOCYTES NFR BLD AUTO: 1 %
IRON SATN MFR SERPL: 19 % (ref 15–55)
IRON SERPL-MCNC: 65 UG/DL (ref 27–139)
KETONES UR QL STRIP: NEGATIVE
LDLC SERPL CALC-MCNC: 106 MG/DL (ref 0–99)
LEUKOCYTE ESTERASE UR QL STRIP: ABNORMAL
LYMPHOCYTES # BLD AUTO: 2.6 X10E3/UL (ref 0.7–3.1)
LYMPHOCYTES NFR BLD AUTO: 39 %
MCH RBC QN AUTO: 28.9 PG (ref 26.6–33)
MCHC RBC AUTO-ENTMCNC: 33.2 G/DL (ref 31.5–35.7)
MCV RBC AUTO: 87 FL (ref 79–97)
MICRO URNS: ABNORMAL
MONOCYTES # BLD AUTO: 0.6 X10E3/UL (ref 0.1–0.9)
MONOCYTES NFR BLD AUTO: 9 %
NEUTROPHILS # BLD AUTO: 3.3 X10E3/UL (ref 1.4–7)
NEUTROPHILS NFR BLD AUTO: 51 %
NITRITE UR QL STRIP: NEGATIVE
PH UR STRIP: 5.5 [PH] (ref 5–7.5)
PLATELET # BLD AUTO: 163 X10E3/UL (ref 150–450)
POTASSIUM SERPL-SCNC: 4 MMOL/L (ref 3.5–5.2)
PROT SERPL-MCNC: 6.5 G/DL (ref 6–8.5)
PROT UR QL STRIP: NEGATIVE
RBC # BLD AUTO: 4.6 X10E6/UL (ref 3.77–5.28)
RBC #/AREA URNS HPF: ABNORMAL /HPF (ref 0–2)
SODIUM SERPL-SCNC: 141 MMOL/L (ref 134–144)
SP GR UR STRIP: 1.02 (ref 1–1.03)
T3FREE SERPL-MCNC: 2.7 PG/ML (ref 2–4.4)
T4 FREE SERPL-MCNC: 1.44 NG/DL (ref 0.82–1.77)
TIBC SERPL-MCNC: 349 UG/DL (ref 250–450)
TRIGL SERPL-MCNC: 159 MG/DL (ref 0–149)
TSH SERPL DL<=0.005 MIU/L-ACNC: 6.72 UIU/ML (ref 0.45–4.5)
UIBC SERPL-MCNC: 284 UG/DL (ref 118–369)
UROBILINOGEN UR STRIP-MCNC: 0.2 MG/DL (ref 0.2–1)
VIT B12 SERPL-MCNC: 1282 PG/ML (ref 232–1245)
VLDLC SERPL CALC-MCNC: 28 MG/DL (ref 5–40)
WBC # BLD AUTO: 6.6 X10E3/UL (ref 3.4–10.8)
WBC #/AREA URNS HPF: ABNORMAL /HPF (ref 0–5)

## 2022-09-01 ENCOUNTER — OFFICE VISIT (OUTPATIENT)
Dept: FAMILY MEDICINE CLINIC | Facility: CLINIC | Age: 77
End: 2022-09-01

## 2022-09-01 VITALS
HEART RATE: 62 BPM | RESPIRATION RATE: 16 BRPM | HEIGHT: 65 IN | TEMPERATURE: 97.5 F | BODY MASS INDEX: 44.48 KG/M2 | OXYGEN SATURATION: 98 % | SYSTOLIC BLOOD PRESSURE: 120 MMHG | WEIGHT: 267 LBS | DIASTOLIC BLOOD PRESSURE: 80 MMHG

## 2022-09-01 DIAGNOSIS — R94.6 THYROID FUNCTION TEST ABNORMAL: ICD-10-CM

## 2022-09-01 DIAGNOSIS — E66.01 CLASS 3 SEVERE OBESITY DUE TO EXCESS CALORIES WITH BODY MASS INDEX (BMI) OF 40.0 TO 44.9 IN ADULT, UNSPECIFIED WHETHER SERIOUS COMORBIDITY PRESENT: ICD-10-CM

## 2022-09-01 DIAGNOSIS — E78.2 MIXED HYPERLIPIDEMIA: ICD-10-CM

## 2022-09-01 DIAGNOSIS — K21.9 GASTROESOPHAGEAL REFLUX DISEASE WITHOUT ESOPHAGITIS: ICD-10-CM

## 2022-09-01 DIAGNOSIS — E11.9 TYPE 2 DIABETES MELLITUS WITHOUT COMPLICATION, WITHOUT LONG-TERM CURRENT USE OF INSULIN: ICD-10-CM

## 2022-09-01 DIAGNOSIS — I10 PRIMARY HYPERTENSION: ICD-10-CM

## 2022-09-01 DIAGNOSIS — G47.33 OSA (OBSTRUCTIVE SLEEP APNEA): ICD-10-CM

## 2022-09-01 DIAGNOSIS — I48.0 PAROXYSMAL ATRIAL FIBRILLATION: Primary | ICD-10-CM

## 2022-09-01 DIAGNOSIS — D68.51 FACTOR 5 LEIDEN MUTATION, HETEROZYGOUS: ICD-10-CM

## 2022-09-01 PROCEDURE — 99214 OFFICE O/P EST MOD 30 MIN: CPT | Performed by: PHYSICIAN ASSISTANT

## 2022-09-01 RX ORDER — LOSARTAN POTASSIUM 50 MG/1
50 TABLET ORAL DAILY
Qty: 90 TABLET | Refills: 1 | Status: SHIPPED | OUTPATIENT
Start: 2022-09-01 | End: 2022-09-01 | Stop reason: SDUPTHER

## 2022-09-01 RX ORDER — LOSARTAN POTASSIUM 50 MG/1
50 TABLET ORAL DAILY
Qty: 90 TABLET | Refills: 1 | Status: SHIPPED | OUTPATIENT
Start: 2022-09-01 | End: 2022-10-13 | Stop reason: SDUPTHER

## 2022-09-01 NOTE — PROGRESS NOTES
"Subjective   Jessica Ames is a 77 y.o. female.     History of Present Illness    Since the last visit, she has overall felt well.  She has Primary Hypertension and well controlled on current medication, GERD controlled on PPI Rx, Hyperlipidemia with goals met with current Rx, Atrial Fibillation and remains under the care of their cardiologist for management, Vitamin D deficiency and labs are at goal >30 ng/mL and New diagnosis today of type 2 diabetes patient wants to wait on adding metformin and work on diet and exercise.  Also noting history of mixed hyperlipidemia intolerant to statins and Zetia is helping.  Borderline thyroid labs with elevated TSH but normal free T3 and free T4 plan to observe and repeat labs in 3 months.  Also repeat A1c in 3 months and send her for diabetes Ed and dietitian consult.  she has been compliant with current medications have reviewed them.  The patient denies medication side effects.  Will refill medications. /80 (BP Location: Left arm, Patient Position: Sitting, Cuff Size: Adult)   Pulse 62   Temp 97.5 °F (36.4 °C)   Resp 16   Ht 165.1 cm (65\")   Wt 121 kg (267 lb)   LMP  (LMP Unknown)   SpO2 98%   BMI 44.43 kg/m²   Jessica Ames female 77 y.o., /80 (BP Location: Left arm, Patient Position: Sitting, Cuff Size: Adult)   Pulse 62   Temp 97.5 °F (36.4 °C)   Resp 16   Ht 165.1 cm (65\")   Wt 121 kg (267 lb)   LMP  (LMP Unknown)   SpO2 98%   BMI 44.43 kg/m²   who presents today for follow up of Depression and Anxiety.  She reports medication is working well, patient desires to continue on Rx, and needs refill. Onset of symptoms was approximately several months ago. She denies current suicidal and homicidal ideation. Risk factors are lifestyle of multiple roles.  Previous treatment includes current Rx. She complains of the following medication side effects:none. The patient declines to go to counseling..      Results for orders placed or performed in visit on " 08/06/22   Comprehensive metabolic panel    Specimen: Blood   Result Value Ref Range    Glucose 118 (H) 65 - 99 mg/dL    BUN 15 8 - 27 mg/dL    Creatinine 1.00 0.57 - 1.00 mg/dL    EGFR Result 58 (L) >59 mL/min/1.73    BUN/Creatinine Ratio 15 12 - 28    Sodium 141 134 - 144 mmol/L    Potassium 4.0 3.5 - 5.2 mmol/L    Chloride 102 96 - 106 mmol/L    Total CO2 20 20 - 29 mmol/L    Calcium 9.5 8.7 - 10.3 mg/dL    Total Protein 6.5 6.0 - 8.5 g/dL    Albumin 4.2 3.7 - 4.7 g/dL    Globulin 2.3 1.5 - 4.5 g/dL    A/G Ratio 1.8 1.2 - 2.2    Total Bilirubin 0.5 0.0 - 1.2 mg/dL    Alkaline Phosphatase 80 44 - 121 IU/L    AST (SGOT) 18 0 - 40 IU/L    ALT (SGPT) 16 0 - 32 IU/L   Lipid panel    Specimen: Blood   Result Value Ref Range    Total Cholesterol 171 100 - 199 mg/dL    Triglycerides 159 (H) 0 - 149 mg/dL    HDL Cholesterol 37 (L) >39 mg/dL    VLDL Cholesterol Danny 28 5 - 40 mg/dL    LDL Chol Calc (NIH) 106 (H) 0 - 99 mg/dL   TSH    Specimen: Blood   Result Value Ref Range    TSH 6.720 (H) 0.450 - 4.500 uIU/mL   Hemoglobin A1c    Specimen: Blood   Result Value Ref Range    Hemoglobin A1C 6.6 (H) 4.8 - 5.6 %   Vitamin B12    Specimen: Blood   Result Value Ref Range    Vitamin B-12 1,282 (H) 232 - 1,245 pg/mL   Folate    Specimen: Blood   Result Value Ref Range    Folate 14.3 >3.0 ng/mL   Vitamin D 25 Hydroxy    Specimen: Blood   Result Value Ref Range    25 Hydroxy, Vitamin D 54.6 30.0 - 100.0 ng/mL   T4, Free    Specimen: Blood   Result Value Ref Range    Free T4 1.44 0.82 - 1.77 ng/dL   T3, Free    Specimen: Blood   Result Value Ref Range    T3, Free 2.7 2.0 - 4.4 pg/mL   Iron Profile    Specimen: Blood   Result Value Ref Range    TIBC 349 250 - 450 ug/dL    UIBC 284 118 - 369 ug/dL    Iron 65 27 - 139 ug/dL    Iron Saturation 19 15 - 55 %   Ferritin    Specimen: Blood   Result Value Ref Range    Ferritin 77 15 - 150 ng/mL   Microscopic Examination -   Result Value Ref Range    WBC, UA 11-30 (A) 0 - 5 /hpf    RBC, UA 0-2  0 - 2 /hpf    Epithelial Cells (non renal) >10 (A) 0 - 10 /hpf    Casts None seen None seen /lpf    Bacteria, UA Moderate (A) None seen/Few   CBC and Differential    Specimen: Blood   Result Value Ref Range    WBC 6.6 3.4 - 10.8 x10E3/uL    RBC 4.60 3.77 - 5.28 x10E6/uL    Hemoglobin 13.3 11.1 - 15.9 g/dL    Hematocrit 40.1 34.0 - 46.6 %    MCV 87 79 - 97 fL    MCH 28.9 26.6 - 33.0 pg    MCHC 33.2 31.5 - 35.7 g/dL    RDW 14.0 11.7 - 15.4 %    Platelets 163 150 - 450 x10E3/uL    Neutrophil Rel % 51 Not Estab. %    Lymphocyte Rel % 39 Not Estab. %    Monocyte Rel % 9 Not Estab. %    Eosinophil Rel % 0 Not Estab. %    Basophil Rel % 0 Not Estab. %    Neutrophils Absolute 3.3 1.4 - 7.0 x10E3/uL    Lymphocytes Absolute 2.6 0.7 - 3.1 x10E3/uL    Monocytes Absolute 0.6 0.1 - 0.9 x10E3/uL    Eosinophils Absolute 0.0 0.0 - 0.4 x10E3/uL    Basophils Absolute 0.0 0.0 - 0.2 x10E3/uL    Immature Granulocyte Rel % 1 Not Estab. %    Immature Grans Absolute 0.1 0.0 - 0.1 x10E3/uL   Urinalysis With Microscopic - Urine, Clean Catch    Specimen: Urine, Clean Catch   Result Value Ref Range    Specific Gravity, UA 1.019 1.005 - 1.030    pH, UA 5.5 5.0 - 7.5    Color, UA Yellow Yellow    Appearance, UA Cloudy (A) Clear    Leukocytes, UA 2+ (A) Negative    Protein Negative Negative/Trace    Glucose, UA Negative Negative    Ketones Negative Negative    Blood, UA Trace (A) Negative    Bilirubin, UA Negative Negative    Urobilinogen, UA 0.2 0.2 - 1.0 mg/dL    Nitrite, UA Negative Negative    Microscopic Examination See below:        est CC 89  Declined Prolia  On Actonel 10-18-19    Last visit with cardiology was 5/27/2022 saw APRN.  Noted to follow-up on her paroxysmal atrial fibrillation.  EKG performed same day similar to prior EKG noting atrial premature contractions and left anterior fascicular block and diagnosis of PVCs.  1.  Paroxysmal atrial fibrillation: No atrial fibrillation detected over the last 4 years on 30 day event and 48  hour holter monitors.  Patient notes that she has episodes of atrial fibrillation about once every 3 months as she feels fatigued and her heart rate shows rate between 120 and 160.  On beta-blocker.  Anticoagulation at this time.  Instructed patient to try Valsalva maneuver when she notices her heart rate is high.  2.  Hypertension: Well-controlled on current medication  3.  Anxiety: On last visit patient had just found out her brother passed away of COVID-19.  She is now on antidepressant and feels much better.  4. History of DVT/ PE : following knee replacement in 2013.  Wears compression hose     Ms. Ames will follow up with Dr. Lo in 6 months.  She will call sooner for any questions or concerns.    Has been to [facility] and had lumbar x-rays performed at Ortho office.  Noted results to be no disc herniation and would do pain management and physical therapy.  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 Bowman  Last EGD 11-1-18 DR Osuna  Cannot take statins; intol  Also follow-up for anxiety and depression noting started Zoloft 10/12/2021  The following portions of the patient's history were reviewed and updated as appropriate: allergies, current medications, past family history, past medical history, past social history, past surgical history and problem list.    Review of Systems    Objective   Physical Exam  Vitals and nursing note reviewed.   Constitutional:       General: She is not in acute distress.     Appearance: She is well-developed. She is obese. She is not ill-appearing or toxic-appearing.   HENT:      Head: Normocephalic.      Right Ear: External ear normal.      Left Ear: External ear normal.      Nose: Nose normal.      Mouth/Throat:      Pharynx: Oropharynx is clear.   Eyes:      General: No scleral icterus.     Conjunctiva/sclera: Conjunctivae normal.      Pupils: Pupils are equal, round, and reactive to  light.   Neck:      Thyroid: No thyromegaly.      Vascular: No carotid bruit.   Cardiovascular:      Rate and Rhythm: Normal rate and regular rhythm.      Pulses: Normal pulses.      Heart sounds: Normal heart sounds. No murmur heard.  Pulmonary:      Effort: Pulmonary effort is normal. No respiratory distress.      Breath sounds: Normal breath sounds. No rales.   Musculoskeletal:         General: No deformity. Normal range of motion.      Cervical back: Normal range of motion and neck supple.      Right lower leg: Edema present.      Left lower leg: Edema present.      Comments: Just over Trace pedal edema = bilat     Skin:     General: Skin is warm and dry.      Coloration: Skin is not jaundiced.      Findings: No rash.   Neurological:      General: No focal deficit present.      Mental Status: She is alert and oriented to person, place, and time. Mental status is at baseline.   Psychiatric:         Mood and Affect: Mood normal.         Behavior: Behavior normal.         Thought Content: Thought content normal.         Judgment: Judgment normal.         Assessment & Plan   Diagnoses and all orders for this visit:    1. Paroxysmal atrial fibrillation (HCC) (Primary)  Comments:  per cardiologist    2. Class 3 severe obesity due to excess calories with body mass index (BMI) of 40.0 to 44.9 in adult, unspecified whether serious comorbidity present (HCC)    3. Factor 5 Leiden mutation, heterozygous ([facility])  Comments:  Sees Dr. Bowman, hematologist    4. Gastroesophageal reflux disease without esophagitis    5. Primary hypertension    6. Mixed hyperlipidemia    7. ERIC (obstructive sleep apnea)    8. Type 2 diabetes mellitus without complication, without long-term current use of insulin ([facility])    9. Thyroid function test abnormal    Plan, Jessica Ames, was seen today.  she was seen for HTN and continue medication, GERD and will continue on PPI medication, Atrial Fibrillation and remains on medication for treatment and is  stable, Atrial Fibrillation and remains under the care of their cardiologist for medical management and is stable, Vitamin D deficiency and supplemented and New diagnosis today of type 2 diabetes and will refer for diabetes Ed and dietitian and declines starting medication wants 3 months diet and exercise with labs planned.  Also will follow-up on the borderline thyroid labs with TSH slightly elevated.  Continue Zoloft for anxiety depression working  Hematologist Dr. Bowman continues to monitor her factor V Leiden and anemia from malabsorption  Followed by cardiology for A. fib.  Use Cpap/Bipap every night  Cont B12 --do once week

## 2022-09-01 NOTE — PATIENT INSTRUCTIONS
Diabetes Mellitus Basics    Diabetes mellitus, or diabetes, is a long-term (chronic) disease. It occurs when the body does not properly use sugar (glucose) that is released from food after you eat.  Diabetes mellitus may be caused by one or both of these problems:  Your pancreas does not make enough of a hormone called insulin.  Your body does not react in a normal way to the insulin that it makes.  Insulin lets glucose enter cells in your body. This gives you energy. If you have diabetes, glucose cannot get into cells. This causes high blood glucose (hyperglycemia).  How to treat and manage diabetes  You may need to take insulin or other diabetes medicines daily to keep your glucose in balance. If you are prescribed insulin, you will learn how to give yourself insulin by injection. You may need to adjust the amount of insulin you take based on the foods that you eat.  You will need to check your blood glucose levels using a glucose monitor as told by your health care provider. The readings can help determine if you have low or high blood glucose.  Generally, you should have these blood glucose levels:  Before meals (preprandial):  mg/dL (4.4-7.2 mmol/L).  After meals (postprandial): below 180 mg/dL (10 mmol/L).  Hemoglobin A1c (HbA1c) level: less than 7%.  Your health care provider will set treatment goals for you.  Keep all follow-up visits. This is important.  Follow these instructions at home:  Diabetes medicines  Take your diabetes medicines every day as told by your health care provider. List your diabetes medicines here:  Name of medicine: ______________________________  Amount (dose): _______________ Time (a.m./p.m.): _______________ Notes: ___________________________________  Name of medicine: ______________________________  Amount (dose): _______________ Time (a.m./p.m.): _______________ Notes: ___________________________________  Name of medicine: ______________________________  Amount (dose):  _______________ Time (a.m./p.m.): _______________ Notes: ___________________________________  Insulin  If you use insulin, list the types of insulin you use here:  Insulin type: ______________________________  Amount (dose): _______________ Time (a.m./p.m.): _______________Notes: ___________________________________  Insulin type: ______________________________  Amount (dose): _______________ Time (a.m./p.m.): _______________ Notes: ___________________________________  Insulin type: ______________________________  Amount (dose): _______________ Time (a.m./p.m.): _______________ Notes: ___________________________________  Insulin type: ______________________________  Amount (dose): _______________ Time (a.m./p.m.): _______________ Notes: ___________________________________  Insulin type: ______________________________  Amount (dose): _______________ Time (a.m./p.m.): _______________ Notes: ___________________________________  Managing blood glucose    Check your blood glucose levels using a glucose monitor as told by your health care provider.  Write down the times that you check your glucose levels here:  Time: _______________ Notes: ___________________________________  Time: _______________ Notes: ___________________________________  Time: _______________ Notes: ___________________________________  Time: _______________ Notes: ___________________________________  Time: _______________ Notes: ___________________________________  Time: _______________ Notes: ___________________________________    Low blood glucose  Low blood glucose (hypoglycemia) is when glucose is at or below 70 mg/dL (3.9 mmol/L). Symptoms may include:  Feeling:  Hungry.  Sweaty and clammy.  Irritable or easily upset.  Dizzy.  Sleepy.  Having:  A fast heartbeat.  A headache.  A change in your vision.  Numbness around the mouth, lips, or tongue.  Having trouble with:  Moving (coordination).  Sleeping.  Treating low blood glucose  To treat low blood  glucose, eat or drink something containing sugar right away. If you can think clearly and swallow safely, follow the 15:15 rule:  Take 15 grams of a fast-acting carb (carbohydrate), as told by your health care provider.  Some fast-acting carbs are:  Glucose tablets: take 3-4 tablets.  Hard candy: eat 3-5 pieces.  Fruit juice: drink 4 oz (120 mL).  Regular (not diet) soda: drink 4-6 oz (120-180 mL).  Honey or sugar: eat 1 Tbsp (15 mL).  Check your blood glucose levels 15 minutes after you take the carb.  If your glucose is still at or below 70 mg/dL (3.9 mmol/L), take 15 grams of a carb again.  If your glucose does not go above 70 mg/dL (3.9 mmol/L) after 3 tries, get help right away.  After your glucose goes back to normal, eat a meal or a snack within 1 hour.  Treating very low blood glucose  If your glucose is at or below 54 mg/dL (3 mmol/L), you have very low blood glucose (severe hypoglycemia).  This is an emergency. Do not wait to see if the symptoms will go away. Get medical help right away. Call your local emergency services (911 in the [place] Do not drive yourself to the hospital.  Questions to ask your health care provider  Should I talk with a diabetes educator?  What equipment will I need to care for myself at home?  What diabetes medicines do I need? When should I take them?  How often do I need to check my blood glucose levels?  What number can I call if I have questions?  When is my follow-up visit?  Where can I find a support group for people with diabetes?  Where to find more information  American Diabetes Association: www.diabetes.org  Association of Diabetes Care and Education Specialists: www.diabeteseducator.org  Contact a health care provider if:  Your blood glucose is at or above 240 mg/dL (13.3 mmol/L) for 2 days in a row.  You have been sick or have had a fever for 2 days or more, and you are not getting better.  You have any of these problems for more than 6 hours:  You cannot eat or  drink.  You feel nauseous.  You vomit.  You have diarrhea.  Get help right away if:  Your blood glucose is lower than 54 mg/dL (3 mmol/L).  You get confused.  You have trouble thinking clearly.  You have trouble breathing.  These symptoms may represent a serious problem that is an emergency. Do not wait to see if the symptoms will go away. Get medical help right away. Call your local emergency services (911 in the [place] Do not drive yourself to the hospital.  Summary  Diabetes mellitus is a chronic disease that occurs when the body does not properly use sugar (glucose) that is released from food after you eat.  Take insulin and diabetes medicines as told.  Check your blood glucose every day, as often as told.  Keep all follow-up visits. This is important.  This information is not intended to replace advice given to you by your health care provider. Make sure you discuss any questions you have with your health care provider.  Document Revised: 04/20/2021 Document Reviewed: 04/20/2021  [employer] Patient Education © 2021 [employer]

## 2022-09-15 ENCOUNTER — TELEPHONE (OUTPATIENT)
Dept: FAMILY MEDICINE CLINIC | Facility: CLINIC | Age: 77
End: 2022-09-15

## 2022-09-15 ENCOUNTER — TELEPHONE (OUTPATIENT)
Dept: ORTHOPEDIC SURGERY | Facility: CLINIC | Age: 77
End: 2022-09-15

## 2022-09-15 NOTE — TELEPHONE ENCOUNTER
Caller: MICHEL MONDRAGON    Relationship to patient: SELF    Best call back number: [phone]    Chief complaint: RIGHT HIP    Type of visit: CORTISONE INJECTION    Requested date: ASAP    If rescheduling, when is the original appointment: N/A    Additional notes: LAST INJECTION WAS MARCH 2022

## 2022-09-15 NOTE — TELEPHONE ENCOUNTER
Patient saw me for multiple issues of chronic conditions at appointment and did not document abdominal pain will have to see me for

## 2022-09-15 NOTE — TELEPHONE ENCOUNTER
Caller: Jessica Ames    Relationship: Self    Best call back number: [phone]     What is the medical concern/diagnosis: STOMACH PAIN    What specialty or service is being requested: GASTROENTEROLOGIST     What is the provider, practice or medical service name: DR.GRAHAM RODRIGEZ    What is the office location:     What is the office phone number: [phone]    Any additional details: PLEASE ADVISE

## 2022-09-15 NOTE — TELEPHONE ENCOUNTER
Pt states when she seen you she said she talked to you about this when she stands up she has some pains when she stretches. She said you told her you would refer her.

## 2022-09-15 NOTE — TELEPHONE ENCOUNTER
Need to know what is going on?  If having acute abdominal pain go to ER.  I do not recall discussing this with her before?

## 2022-09-16 NOTE — TELEPHONE ENCOUNTER
Will you call this pt she has told me she spoke to Hortencia regarding this and she does not want to schedule she just wants referral place.

## 2022-09-19 ENCOUNTER — OFFICE VISIT (OUTPATIENT)
Dept: FAMILY MEDICINE CLINIC | Facility: CLINIC | Age: 77
End: 2022-09-19

## 2022-09-19 VITALS
BODY MASS INDEX: 44.48 KG/M2 | HEART RATE: 53 BPM | DIASTOLIC BLOOD PRESSURE: 74 MMHG | HEIGHT: 65 IN | RESPIRATION RATE: 18 BRPM | OXYGEN SATURATION: 95 % | SYSTOLIC BLOOD PRESSURE: 124 MMHG | TEMPERATURE: 97 F | WEIGHT: 267 LBS

## 2022-09-19 DIAGNOSIS — R14.0 ABDOMINAL BLOATING: Primary | ICD-10-CM

## 2022-09-19 DIAGNOSIS — K21.9 GASTROESOPHAGEAL REFLUX DISEASE WITHOUT ESOPHAGITIS: ICD-10-CM

## 2022-09-19 DIAGNOSIS — R10.9 ABDOMINAL CRAMPING: ICD-10-CM

## 2022-09-19 PROCEDURE — 99214 OFFICE O/P EST MOD 30 MIN: CPT | Performed by: NURSE PRACTITIONER

## 2022-09-19 NOTE — PROGRESS NOTES
"Chief Complaint  Abdominal Pain (Requesting GI referral off and on for years)    Subjective          Jessica presents to [facility]  As a 77 year old female to discuss requesting a GI referral.  She has had a history of GERD and taking omeprazole. She tries to avoid triggers.  She has been having abdominal cramping and bloating intermittently for several years that is not localized to one area  Usually right or left upper quad, but not always.  She states it is after she eats and is resolved on its own within a couple of hours.  She states sometimes she has to lay down or walk around to relieve gas before the pain and bloating goes away  She denies any fevers, no N/V/D.  No severe distension, but \"feels bloated\"    She has no acute pain in the office today  Requesting a referral to GI-  Her  saw Malik in hospital.  Previous GI retired and has not seen anyone for several years.    No other c/o today    The following portions of the patient's history were reviewed and updated as appropriate: allergies, current medications, past family history, past medical history, past social history, past surgical history, and problem list  Answers for HPI/ROS submitted by the patient on 9/18/2022  What is the primary reason for your visit?: Abdominal Pain  Chronicity: recurrent  Onset: more than 1 year ago  Onset quality: sudden  Frequency: every several days  Episode duration: 1 hours  Progression since onset: gradually worsening  Pain location: LUQ, RLQ, RUQ  Pain - numeric: 8/10  Pain quality: cramping, sharp, tearing  Radiates to: LUQ, RUQ, right flank  anorexia: No  belching: Yes  flatus: Yes  hematochezia: No  melena: No  weight loss: No  Aggravated by: certain positions, movement  Relieved by: movement, recumbency, standing        Review of Systems   Constitutional: Negative for fever.   HENT: Negative for congestion.    Eyes: Negative for visual disturbance.   Respiratory: Negative for " "cough, shortness of breath and wheezing.    Cardiovascular: Negative for chest pain, palpitations and leg swelling.   Gastrointestinal: Positive for abdominal pain. Negative for abdominal distention, anal bleeding, blood in stool, constipation, diarrhea, nausea, rectal pain and vomiting.   Genitourinary: Negative for dysuria, frequency and hematuria.   Musculoskeletal: Negative for arthralgias.   Neurological: Negative for dizziness.        Objective   Vital Signs:   Vitals:    09/19/22 0951   BP: 124/74   Pulse: 53   Resp: 18   Temp: 97 °F (36.1 °C)   SpO2: 95%   Weight: 121 kg (267 lb)   Height: 165 cm (64.96\")        Class 3 Severe Obesity (BMI >=40). Obesity-related health conditions include the following: diabetes mellitus, dyslipidemias and GERD. Obesity is unchanged. BMI is is above average; BMI management plan is completed. We discussed portion control and increasing exercise.        Physical Exam  Vitals reviewed.   Constitutional:       General: She is not in acute distress.  Eyes:      Conjunctiva/sclera: Conjunctivae normal.   Neck:      Thyroid: No thyromegaly.      Vascular: No carotid bruit.   Cardiovascular:      Rate and Rhythm: Normal rate and regular rhythm.      Heart sounds: Normal heart sounds.   Pulmonary:      Effort: Pulmonary effort is normal.      Breath sounds: Normal breath sounds.   Abdominal:      General: Abdomen is flat. Bowel sounds are normal. There is no distension.      Palpations: Abdomen is soft. There is no mass.      Tenderness: There is no abdominal tenderness. There is no right CVA tenderness, left CVA tenderness, guarding or rebound.      Hernia: No hernia is present.   Neurological:      Mental Status: She is alert.   Psychiatric:         Attention and Perception: Attention normal.         Mood and Affect: Mood normal.          Result Review :     The following data was reviewed by: MIRA Szymanski on 09/19/2022:  Comprehensive metabolic panel (08/26/2022 " 08:52)  Lipid panel (08/26/2022 08:52)  CBC and Differential (08/26/2022 08:52)  TSH (08/26/2022 08:52)  Hemoglobin A1c (08/26/2022 08:52)  Vitamin B12 (08/26/2022 08:52)  Folate (08/26/2022 08:52)  Vitamin D 25 Hydroxy (08/26/2022 08:52)  T4, Free (08/26/2022 08:52)  T3, Free (08/26/2022 08:52)  Type 2 DM  6.6  tsh 6.7 increased  Vit b supraoptimal    Vit d normal         Assessment and Plan    Diagnoses and all orders for this visit:    1. Abdominal bloating (Primary)  Comments:  request Gi consult  Hx GERD  bloating after certain meals  avoid gas producing foods  Orders:  -     Ambulatory Referral to Gastroenterology    2. Gastroesophageal reflux disease without esophagitis  Comments:  avoid triggers  continue omeprazole  Orders:  -     Ambulatory Referral to Gastroenterology    3. Abdominal cramping  Comments:  intermittent after eating-  relieved after a couple hours      TO ER with any severe abdominal pain  Avoid gas producing foods  Avoid triggers for GERD  Including  Spicy foods, alcohol, caffeine  Continue omeprazole  Follow up with any worsening or changes  Declined CT-  Would like to discuss with GI    Follow Up   Return if symptoms worsen or fail to improve, for keep regualr follow up with Hortencia.  Patient was given instructions and counseling regarding her condition or for health maintenance advice. Please see specific information pulled into the AVS if appropriate.

## 2022-09-21 ENCOUNTER — CLINICAL SUPPORT (OUTPATIENT)
Dept: ORTHOPEDIC SURGERY | Facility: CLINIC | Age: 77
End: 2022-09-21

## 2022-09-21 VITALS — WEIGHT: 274 LBS | BODY MASS INDEX: 45.65 KG/M2 | HEIGHT: 65 IN | TEMPERATURE: 98 F

## 2022-09-21 DIAGNOSIS — R52 PAIN: Primary | ICD-10-CM

## 2022-09-21 DIAGNOSIS — M70.62 GREATER TROCHANTERIC BURSITIS OF LEFT HIP: ICD-10-CM

## 2022-09-21 PROCEDURE — 73502 X-RAY EXAM HIP UNI 2-3 VIEWS: CPT | Performed by: NURSE PRACTITIONER

## 2022-09-21 PROCEDURE — 20610 DRAIN/INJ JOINT/BURSA W/O US: CPT | Performed by: NURSE PRACTITIONER

## 2022-09-21 RX ORDER — METHYLPREDNISOLONE ACETATE 80 MG/ML
80 INJECTION, SUSPENSION INTRA-ARTICULAR; INTRALESIONAL; INTRAMUSCULAR; SOFT TISSUE
Status: COMPLETED | OUTPATIENT
Start: 2022-09-21 | End: 2022-09-21

## 2022-09-21 RX ADMIN — METHYLPREDNISOLONE ACETATE 80 MG: 80 INJECTION, SUSPENSION INTRA-ARTICULAR; INTRALESIONAL; INTRAMUSCULAR; SOFT TISSUE at 09:38

## 2022-09-21 NOTE — PROGRESS NOTES
Patient: Jessica Ames  YOB: 1945  Date of Service: 9/21/2022    Chief Complaints:   Chief Complaint   Patient presents with   • Right Hip - Pain, Follow-up       Subjective: Here for right hip greater trochanter bursitis - preparing for a trip to [place]     History of Present Illness: Pt is seen in the office today with complaints of   Chief Complaint   Patient presents with   • Right Hip - Pain, Follow-up   .  HIP: TIMING:  The pain is described as ACUTE.  AGGRAVATING FACTORS:  Is worsened by prolonged standing, sitting, and walking activities.  CHARACTERISTICS:  aching, stiffness, and difficulty walking.    This problem is not new to this examiner.     Allergies:   Allergies   Allergen Reactions   • Adenosine Other (See Comments)     Paralyzed lungs and vocal chords   • Lisinopril Cough   • Celecoxib Palpitations     LE EDEMA   • Naproxen Palpitations     LE EDEMA   • Risedronate Palpitations   • Risedronate Sodium Palpitations     LE EDEMA   • Sulfa Antibiotics Hives       Medications:   Home Medications:  Current Outpatient Medications on File Prior to Visit   Medication Sig   • Acetaminophen (TYLENOL PO) Take  by mouth Daily As Needed.   • aspirin 81 MG EC tablet Take 81 mg by mouth Daily.   • Cholecalciferol 2000 units capsule Take 2,000 Units by mouth Daily. One PO daily   • Cyanocobalamin (VITAMIN B-12) 1000 MCG sublingual tablet Place 1,000 mcg under the tongue Daily. One SL daily   • ezetimibe (ZETIA) 10 MG tablet Take 1 tablet by mouth Daily. for cholesterol   • levothyroxine (SYNTHROID, LEVOTHROID) 50 MCG tablet Take 1 tablet by mouth Daily.   • losartan (COZAAR) 50 MG tablet Take 1 tablet by mouth Daily. For BP   • metoprolol succinate XL (TOPROL-XL) 25 MG 24 hr tablet 1 PO once daily for heart   • omeprazole (priLOSEC) 40 MG capsule Take 1 capsule by mouth 2 (Two) Times a Day.   • risedronate (ACTONEL) 150 MG tablet Take 1 tablet by mouth Every 30 (Thirty) Days. with water on empty  stomach, nothing by mouth or lie down for next 30 minutes.   • sertraline (Zoloft) 50 MG tablet one PO daily for stress     No current facility-administered medications on file prior to visit.     Current Medications:  Scheduled Meds:  Continuous Infusions:No current facility-administered medications for this visit.    PRN Meds:.    I have reviewed the patient's medical history in detail and updated the computerized patient record.  Review and summarization of old records include:    Past Medical History:   Diagnosis Date   • Disease of thyroid gland    • DVT (deep venous thrombosis) ([facility]) 2013    right leg; s/p knee replacement; was on xarelto and now baby aspirin; followed by Dr. Parminder Mejia     • Factor V Leiden ([facility])    • First degree AV block 12/7/2012   • GERD (gastroesophageal reflux disease) 12/7/2012   • Hiatal hernia    • Hyperlipidemia    • Hypertension    • IFG (impaired fasting glucose)    • Iron deficiency anemia     sees Dr. Clem Bowman    • Left anterior fascicular block 12/7/2012   • Lower extremity edema    • Obesity 12/7/2012   • ERIC (obstructive sleep apnea)     compliant with CPAP machine    • PAF (paroxysmal atrial fibrillation) ([facility]) 11/2015    day after colonoscopy went into atrial fibrillation; was on Xarelto and now baby aspirin    • Pulmonary embolism ([facility]) 2013    also had DVT    • PVC's (premature ventricular contractions) 12/7/2012   • Venous insufficiency     followed by Dr. Parminder Mejia         Past Surgical History:   Procedure Laterality Date   • BREAST SURGERY      reduction   • CATH LAB PROCEDURE     • HAND SURGERY     • HYSTERECTOMY     • ROTATOR CUFF REPAIR     • SHOULDER SURGERY     • TOTAL KNEE ARTHROPLASTY          Social History     Occupational History   • Not on file   Tobacco Use   • Smoking status: Never Smoker   • Smokeless tobacco: Never Used   • Tobacco comment: minimal caffeine   Vaping Use   • Vaping Use: Never used   Substance and Sexual Activity   • Alcohol  "use: Yes     Comment: \" once month\"   • Drug use: No   • Sexual activity: Defer      Social History     Social History Narrative   • Not on file        Family History   Problem Relation Age of Onset   • Stroke Mother    • Diabetes Mother    • Hypertension Mother    • Uterine cancer Mother    • Hypertension Father    • Heart attack Father    • Emphysema Father    • Diabetes Sister    • Hypertension Sister    • Stroke Brother    • Diabetes Brother    • Hypertension Brother        ROS: 14 point review of systems was performed and was negative except for documented findings in HPI and today's encounter.     Allergies:   Allergies   Allergen Reactions   • Adenosine Other (See Comments)     Paralyzed lungs and vocal chords   • Lisinopril Cough   • Celecoxib Palpitations     LE EDEMA   • Naproxen Palpitations     LE EDEMA   • Risedronate Palpitations   • Risedronate Sodium Palpitations     LE EDEMA   • Sulfa Antibiotics Hives     Constitutional:  Denies fever, shaking or chills   Eyes:  Denies change in visual acuity   HENT:  Denies nasal congestion or sore throat   Respiratory:  Denies cough or shortness of breath   Cardiovascular:  Denies chest pain or severe LE edema   GI:  Denies abdominal pain, nausea, vomiting, bloody stools or diarrhea   Musculoskeletal:  Numbness, tingling, or loss of motor function only as noted above in history of present illness.  : Denies painful urination or hematuria  Integument:  Denies rash, lesion or ulceration   Neurologic:  Denies headache or focal weakness  Endocrine:  Denies lymphadenopathy  Psych:  Denies confusion or change in mental status   Hem:  Denies active bleeding      Physical Exam: 77 y.o. female  Wt Readings from Last 3 Encounters:   09/21/22 124 kg (274 lb)   09/19/22 121 kg (267 lb)   09/01/22 121 kg (267 lb)     Body mass index is 45.6 kg/m².  Facility age limit for growth percentiles is 20 years.  Vitals:    09/21/22 0936   Temp: 98 °F (36.7 °C)     Vital signs " reviewed.   General Appearance:    Alert, cooperative, in no acute distress                  Eyes: conjunctiva clear  ENT: external ears and nose atraumatic  CV: no peripheral edema  Resp: normal respiratory effort  Skin: no rashes or wounds; normal turgor  Psych: mood and affect appropriate  Lymph: no nodes appreciated  Neuro: gross sensation intact  Vascular:  Palpable peripheral pulse in noted extremity  Musculoskeletal Extremities: HIP Exam: normal gait without assistive device right hip 2+ pedal pulses and brisk capillary refill Pedal edema none      Diagnostic Data:  Imaging done today and discussed with the patient:         Procedure:  Large Joint Arthrocentesis: R greater trochanteric bursa  Date/Time: 9/21/2022 9:38 AM  Consent given by: patient  Site marked: site marked  Timeout: Immediately prior to procedure a time out was called to verify the correct patient, procedure, equipment, support staff and site/side marked as required   Supporting Documentation  Indications: pain   Procedure Details  Location: hip - R greater trochanteric bursa  Preparation: Patient was prepped and draped in the usual sterile fashion  Needle gauge: 21 G.  Approach: lateral  Medications administered: 80 mg methylPREDNISolone acetate 80 MG/ML; 4 mL lidocaine (cardiac)  Patient tolerance: patient tolerated the procedure well with no immediate complications          Assessment:     ICD-10-CM ICD-9-CM   1. Pain  R52 780.96   2. Greater trochanteric bursitis of left hip  M70.62 726.5       Plan:   Follow up as indicated.  Ice, elevate, and rest as needed.  The diagnosis(es), natural history, pathophysiology and treatment for diagnosis(es) were discussed. Opportunity given and questions answered.  Biomechanics of pertinent body areas discussed.  When appropriate, the use of ambulatory aids discussed.  EXERCISES:  Advice on benefits of, and types of regular/moderate exercise pertaining to orthopedic diagnosis(es).  MEDICATIONS:  The  risks, benefits, warnings,side effects and alternatives of medications discussed.  Inflammation/pain control; with cold, heat, elevation and/or liniments discussed as appropriate  Cortisone Injection. See procedure note.  HOME EXERCISE/PT program encouraged  MEDICAL RECORDS reviewed from other provider(s) for past and current medical history pertinent to this complaint.    9/21/2022

## 2022-09-26 ENCOUNTER — OFFICE VISIT (OUTPATIENT)
Dept: SURGERY | Facility: CLINIC | Age: 77
End: 2022-09-26

## 2022-09-26 VITALS — HEIGHT: 65 IN | WEIGHT: 267.2 LBS | BODY MASS INDEX: 44.52 KG/M2

## 2022-09-26 DIAGNOSIS — R10.11 RIGHT UPPER QUADRANT ABDOMINAL PAIN: Primary | ICD-10-CM

## 2022-09-26 PROCEDURE — 99203 OFFICE O/P NEW LOW 30 MIN: CPT | Performed by: SURGERY

## 2022-09-26 NOTE — PROGRESS NOTES
Cc: Bloating, crampy abdominal pain    History of presenting illness:   This is a nice, obese 77-year-old female who describes approximately 2 to 3 years of crampy abdominal pain associated with gassy feeling.  She denies much nausea.  The pain is most typically in the upper abdomen, but she does have some pain occasionally in the lower abdomen as well.  She describes irregular bowel habits with alternating loose stools and constipation.  She denies any radiation of this pain into her back.  Food sometimes exacerbates her symptoms, but not reliably.    Past Medical History: Morbid obesity, DVT (heterozygous factor V Leiden mutation, currently not on anticoagulants), pulmonary embolism, glaucoma, gastroesophageal reflux disease, sleep apnea, paroxysmal atrial fibrillation, venous insufficiency, hypertension, hyperlipidemia    Past Surgical History: Remote hemorrhoidectomy, hysterectomy,  section, EGD and colonoscopy done around 4 years ago (patient describes gastric polyps), breast reduction, knee arthroplasty, shoulder surgery    Medications: Aspirin, Zetia, levothyroxine, losartan, metoprolol, Prilosec, Zoloft, risedronate    Allergies: Adenosine, lisinopril, celecoxib, naproxen, risedronate, sulfa drugs    Social History: She is a non-smoker    Family History: Uterine cancer in her mother, diabetes in her father    Review of Systems:  Constitutional: Positive for weight gain, denies fever or chills  Neck: no swollen glands or dysphagia or odynophagia  Respiratory: negative for SOB, cough, hemoptysis or wheezing  Cardiovascular: negative for chest pain, palpitations or peripheral edema  Gastrointestinal: Positive for bloating, abdominal pain, alternating constipated and loose stools      Physical Exam:  BMI: 44.5  General: alert and oriented, appropriate, no acute distress  Eyes: No scleral icterus, extraocular movements are intact  Neck: Supple without lymphadenopathy or thyromegaly, trachea is in the  midline  Respiratory: There is good bilateral chest expansion, no use of accessory muscles is noted  Cardiovascular: No jugular venous distention or peripheral edema is seen  Gastrointestinal: Obese but soft, nontender, no mass, no hernia is felt    Laboratory data: Hemoglobin A1c of 6.6, white blood cell count 6.6, hemoglobin 13.3    Imaging data: No recent relevant data      Assessment and plan:   -Bloating, abdominal cramping, alternating constipation and diarrhea, all sounds most like functional bowel disease than anything else  -I am going to order an ultrasound of the gallbladder to rule out stone disease as a source of this issue, although her symptoms are not classic for that of biliary tract disease  -I have recommended patient see gastroenterology for further medical work-up and consideration of further endoscopy.  We will contact the patient with ultrasound results once available.  My feeling is that her symptoms do not have a surgical solution.      Melecio Gan MD, FACS  General, Minimally Invasive and Endoscopic Surgery  [facility]    [address], [address]  [place], [place], [address]  P: [phone]  F: [phone]

## 2022-09-30 ENCOUNTER — PATIENT ROUNDING (BHMG ONLY) (OUTPATIENT)
Dept: SURGERY | Facility: CLINIC | Age: 77
End: 2022-09-30

## 2022-09-30 NOTE — PROGRESS NOTES
September 30, 2022    Hello, may I speak with Jessica Ames?    My name is Travon      I am  with [facility]  [address]  [place] [address] [phone].    Before we get started may I verify your date of birth? 1945    I am calling to officially welcome you to our practice and ask about your recent visit. Is this a good time to talk? yes    Tell me about your visit with us. What things went well?  Everything went well.        We're always looking for ways to make our patients' experiences even better. Do you have recommendations on ways we may improve?  no    Overall were you satisfied with your first visit to our practice? yes       I appreciate you taking the time to speak with me today. Is there anything else I can do for you? no      Thank you, and have a great day.

## 2022-10-13 RX ORDER — LOSARTAN POTASSIUM 50 MG/1
50 TABLET ORAL DAILY
Qty: 90 TABLET | Refills: 1 | Status: SHIPPED | OUTPATIENT
Start: 2022-10-13 | End: 2022-10-13 | Stop reason: SDUPTHER

## 2022-10-13 RX ORDER — LOSARTAN POTASSIUM 50 MG/1
50 TABLET ORAL DAILY
Qty: 90 TABLET | Refills: 1 | Status: SHIPPED | OUTPATIENT
Start: 2022-10-13 | End: 2023-02-25

## 2022-10-17 ENCOUNTER — HOSPITAL ENCOUNTER (OUTPATIENT)
Dept: ULTRASOUND IMAGING | Facility: HOSPITAL | Age: 77
Discharge: HOME | End: 2022-10-17
Admitting: SURGERY

## 2022-10-17 DIAGNOSIS — R10.11 RIGHT UPPER QUADRANT ABDOMINAL PAIN: ICD-10-CM

## 2022-10-17 PROCEDURE — 76705 ECHO EXAM OF ABDOMEN: CPT

## 2022-10-21 ENCOUNTER — PREP FOR SURGERY (OUTPATIENT)
Dept: OTHER | Facility: HOSPITAL | Age: 77
End: 2022-10-21

## 2022-10-21 DIAGNOSIS — K80.10 CALCULUS OF GALLBLADDER WITH CHRONIC CHOLECYSTITIS WITHOUT OBSTRUCTION: Primary | ICD-10-CM

## 2022-10-21 RX ORDER — SODIUM CHLORIDE 0.9 % (FLUSH) 0.9 %
10 SYRINGE (ML) INJECTION EVERY 12 HOURS SCHEDULED
Status: CANCELLED | OUTPATIENT
Start: 2022-10-28

## 2022-10-21 RX ORDER — CEFAZOLIN SODIUM IN 0.9 % NACL 3 G/100 ML
3 INTRAVENOUS SOLUTION, PIGGYBACK (ML) INTRAVENOUS ONCE
Status: CANCELLED | OUTPATIENT
Start: 2022-10-28 | End: 2022-10-21

## 2022-10-21 RX ORDER — SODIUM CHLORIDE 0.9 % (FLUSH) 0.9 %
10 SYRINGE (ML) INJECTION AS NEEDED
Status: CANCELLED | OUTPATIENT
Start: 2022-10-28

## 2022-10-24 PROBLEM — K80.10 CALCULUS OF GALLBLADDER WITH CHRONIC CHOLECYSTITIS WITHOUT OBSTRUCTION: Status: ACTIVE | Noted: 2022-10-24

## 2022-10-27 ENCOUNTER — PRE-ADMISSION TESTING (OUTPATIENT)
Dept: PREADMISSION TESTING | Facility: HOSPITAL | Age: 77
End: 2022-10-27

## 2022-10-27 VITALS
HEART RATE: 60 BPM | TEMPERATURE: 98.2 F | SYSTOLIC BLOOD PRESSURE: 107 MMHG | WEIGHT: 271 LBS | HEIGHT: 65 IN | RESPIRATION RATE: 16 BRPM | DIASTOLIC BLOOD PRESSURE: 68 MMHG | BODY MASS INDEX: 45.15 KG/M2 | OXYGEN SATURATION: 95 %

## 2022-10-27 LAB
ALBUMIN SERPL-MCNC: 3.9 G/DL (ref 3.5–5.2)
ALBUMIN/GLOB SERPL: 1.7 G/DL
ALP SERPL-CCNC: 74 U/L (ref 39–117)
ALT SERPL W P-5'-P-CCNC: 15 U/L (ref 1–33)
ANION GAP SERPL CALCULATED.3IONS-SCNC: 9 MMOL/L (ref 5–15)
AST SERPL-CCNC: 11 U/L (ref 1–32)
BASOPHILS # BLD AUTO: 0 10*3/MM3 (ref 0–0.2)
BASOPHILS NFR BLD AUTO: 0 % (ref 0–1.5)
BILIRUB SERPL-MCNC: 0.4 MG/DL (ref 0–1.2)
BUN SERPL-MCNC: 12 MG/DL (ref 8–23)
BUN/CREAT SERPL: 13.5 (ref 7–25)
CALCIUM SPEC-SCNC: 9.2 MG/DL (ref 8.6–10.5)
CHLORIDE SERPL-SCNC: 109 MMOL/L (ref 98–107)
CO2 SERPL-SCNC: 24 MMOL/L (ref 22–29)
CREAT SERPL-MCNC: 0.89 MG/DL (ref 0.57–1)
DEPRECATED RDW RBC AUTO: 46.3 FL (ref 37–54)
EGFRCR SERPLBLD CKD-EPI 2021: 66.9 ML/MIN/1.73
EOSINOPHIL # BLD AUTO: 0 10*3/MM3 (ref 0–0.4)
EOSINOPHIL NFR BLD AUTO: 0 % (ref 0.3–6.2)
ERYTHROCYTE [DISTWIDTH] IN BLOOD BY AUTOMATED COUNT: 14.4 % (ref 12.3–15.4)
GLOBULIN UR ELPH-MCNC: 2.3 GM/DL
GLUCOSE SERPL-MCNC: 113 MG/DL (ref 65–99)
HCT VFR BLD AUTO: 37.8 % (ref 34–46.6)
HGB BLD-MCNC: 12.1 G/DL (ref 12–15.9)
LYMPHOCYTES # BLD AUTO: 1.93 10*3/MM3 (ref 0.7–3.1)
LYMPHOCYTES NFR BLD AUTO: 35.5 % (ref 19.6–45.3)
MCH RBC QN AUTO: 28.4 PG (ref 26.6–33)
MCHC RBC AUTO-ENTMCNC: 32 G/DL (ref 31.5–35.7)
MCV RBC AUTO: 88.7 FL (ref 79–97)
MONOCYTES # BLD AUTO: 0.51 10*3/MM3 (ref 0.1–0.9)
MONOCYTES NFR BLD AUTO: 9.4 % (ref 5–12)
NEUTROPHILS NFR BLD AUTO: 2.94 10*3/MM3 (ref 1.7–7)
NEUTROPHILS NFR BLD AUTO: 54 % (ref 42.7–76)
PLATELET # BLD AUTO: 132 10*3/MM3 (ref 140–450)
PMV BLD AUTO: 11.3 FL (ref 6–12)
POTASSIUM SERPL-SCNC: 4.3 MMOL/L (ref 3.5–5.2)
PROT SERPL-MCNC: 6.2 G/DL (ref 6–8.5)
RBC # BLD AUTO: 4.26 10*6/MM3 (ref 3.77–5.28)
SODIUM SERPL-SCNC: 142 MMOL/L (ref 136–145)
WBC NRBC COR # BLD: 5.44 10*3/MM3 (ref 3.4–10.8)

## 2022-10-27 PROCEDURE — 85025 COMPLETE CBC W/AUTO DIFF WBC: CPT | Performed by: SURGERY

## 2022-10-27 PROCEDURE — 80053 COMPREHEN METABOLIC PANEL: CPT | Performed by: SURGERY

## 2022-10-27 PROCEDURE — 36415 COLL VENOUS BLD VENIPUNCTURE: CPT | Performed by: SURGERY

## 2022-10-27 NOTE — DISCHARGE INSTRUCTIONS
Take the following medications the morning of surgery: LEVOTHYROXINE, OMEPRAZOLE, METOPROLOL     ARRIVAL TIME: 600AM       General Instructions:  Do not eat solid food after midnight the night before surgery.  You may drink clear liquids day of surgery but must stop at least one hour before your hospital arrival time.  It is beneficial for you to have a clear drink that contains carbohydrates the day of surgery.  We suggest a 12 to 20 ounce bottle of Gatorade or Powerade for non-diabetic patients or a 12 to 20 ounce bottle of G2 or Powerade Zero for diabetic patients. (Pediatric patients, are not advised to drink a 12 to 20 ounce carbohydrate drink)    Clear liquids are liquids you can see through.  Nothing red in color.     Plain water                               Sports drinks  Sodas                                   Gelatin (Jell-O)  Fruit juices without pulp such as white grape juice and apple juice  Popsicles that contain no fruit or yogurt  Tea or coffee (no cream or milk added)  Gatorade / Powerade  G2 / Powerade Zero     Patients who avoid smoking, chewing tobacco and alcohol for 4 weeks prior to surgery have a reduced risk of post-operative complications.  Quit smoking as many days before surgery as you can.  Do not smoke, use chewing tobacco or drink alcohol the day of surgery.   If applicable bring your C-PAP/ BI-PAP machine  Bring any papers given to you in the doctor’s office.  Wear clean comfortable clothes.  Do not wear contact lenses, false eyelashes or make-up.  Bring a case for your glasses.  Remove all piercings.  Leave jewelry and any other valuables at home.  Hair extensions with metal clips must be removed prior to surgery.  The Pre-Admission Testing nurse will instruct you to bring medications if unable to obtain an accurate list in Pre-Admission Testing.        Preventing a Surgical Site Infection:  For 2 to 3 days before surgery, avoid shaving with a razor because the razor can irritate  skin and make it easier to develop an infection.    Any areas of open skin can increase the risk of a post-operative wound infection by allowing bacteria to enter and travel throughout the body.  Notify your surgeon if you have any skin wounds / rashes even if it is not near the expected surgical site.  The area will need assessed to determine if surgery should be delayed until it is healed.  The night prior to surgery shower using a fresh bar of anti-bacterial soap (such as Dial) and clean washcloth.  Sleep in a clean bed with clean clothing.  Do not allow pets to sleep with you.  Shower on the morning of surgery using a fresh bar of anti-bacterial soap (such as Dial) and clean washcloth.  Dry with a clean towel and dress in clean clothing.  Ask your surgeon if you will be receiving antibiotics prior to surgery.  Make sure you, your family, and all healthcare providers clean their hands with soap and water or an alcohol based hand  before caring for you or your wound.    Day of surgery:  Your arrival time is approximately two hours before your scheduled surgery time.  Upon arrival, a Pre-op nurse and Anesthesiologist will review your health history, obtain vital signs, and answer questions you may have.  The only belongings needed at this time will be a list of your home medications and if applicable your C-PAP/BI-PAP machine.  A Pre-op nurse will start an IV and you may receive medication in preparation for surgery, including something to help you relax.     Please be aware that surgery does come with discomfort.  We want to make every effort to control your discomfort so please discuss any uncontrolled symptoms with your nurse.   Your doctor will most likely have prescribed pain medications.      If you are going home after surgery you will receive individualized written care instructions before being discharged.  A responsible adult must drive you to and from the hospital on the day of your surgery and  stay with you for 24 hours.  Discharge prescriptions can be filled by the hospital pharmacy during regular pharmacy hours.  If you are having surgery late in the day/evening your prescription may be e-prescribed to your pharmacy.  Please verify your pharmacy hours or chose a 24 hour pharmacy to avoid not having access to your prescription because your pharmacy has closed for the day.    If you are staying overnight following surgery, you will be transported to your hospital room following the recovery period.  [facility] has all private rooms.    If you have any questions please call Pre-Admission Testing at [phone].  Deductibles and co-payments are collected on the day of service. Please be prepared to pay the required co-pay, deductible or deposit on the day of service as defined by your plan.    CHLORHEXIDINE CLOTH INSTRUCTIONS  The morning of surgery follow these instructions using the Chlorhexidine cloths you've been given.  These steps reduce bacteria on the body.  Do not use the cloths near your eyes, ears mouth, genitalia or on open wounds.  Throw the cloths away after use but do not try to flush them down a toilet.      Open and remove one cloth at a time from the package.    Leave the cloth unfolded and begin the bathing.  Massage the skin with the cloths using gentle pressure to remove bacteria.  Do not scrub harshly.   Follow the steps below with one 2% CHG cloth per area (6 total cloths).  One cloth for neck, shoulders and chest.  One cloth for both arms, hands, fingers and underarms (do underarms last).  One cloth for the abdomen followed by groin.  One cloth for right leg and foot including between the toes.  One cloth for left leg and foot including between the toes.  The last cloth is to be used for the back of the neck, back and buttocks.    Allow the CHG to air dry 3 minutes on the skin which will give it time to work and decrease the chance of irritation.  The skin may feel  sticky until it is dry.  Do not rinse with water or any other liquid or you will lose the beneficial effects of the CHG.  If mild skin irritation occurs, do rinse the skin to remove the CHG.  Report this to the nurse at time of admission.  Do not apply lotions, creams, ointments, deodorants or perfumes after using the clothes. Dress in clean clothes before coming to the hospital.

## 2022-10-28 ENCOUNTER — HOSPITAL ENCOUNTER (OUTPATIENT)
Facility: HOSPITAL | Age: 77
Setting detail: HOSPITAL OUTPATIENT SURGERY
Discharge: HOME | End: 2022-10-28
Attending: SURGERY | Admitting: SURGERY

## 2022-10-28 ENCOUNTER — ANESTHESIA (OUTPATIENT)
Dept: PERIOP | Facility: HOSPITAL | Age: 77
End: 2022-10-28

## 2022-10-28 ENCOUNTER — ANESTHESIA EVENT (OUTPATIENT)
Dept: PERIOP | Facility: HOSPITAL | Age: 77
End: 2022-10-28

## 2022-10-28 ENCOUNTER — APPOINTMENT (OUTPATIENT)
Dept: GENERAL RADIOLOGY | Facility: HOSPITAL | Age: 77
End: 2022-10-28

## 2022-10-28 VITALS
SYSTOLIC BLOOD PRESSURE: 138 MMHG | BODY MASS INDEX: 45.27 KG/M2 | RESPIRATION RATE: 16 BRPM | WEIGHT: 271.7 LBS | OXYGEN SATURATION: 97 % | DIASTOLIC BLOOD PRESSURE: 63 MMHG | HEIGHT: 65 IN | TEMPERATURE: 97.9 F | HEART RATE: 52 BPM

## 2022-10-28 DIAGNOSIS — K80.10 CALCULUS OF GALLBLADDER WITH CHRONIC CHOLECYSTITIS WITHOUT OBSTRUCTION: ICD-10-CM

## 2022-10-28 LAB
GLUCOSE BLDC GLUCOMTR-MCNC: 117 MG/DL (ref 70–130)
GLUCOSE BLDC GLUCOMTR-MCNC: 137 MG/DL (ref 70–130)

## 2022-10-28 PROCEDURE — 25010000002 FENTANYL CITRATE (PF) 50 MCG/ML SOLUTION: Performed by: NURSE ANESTHETIST, CERTIFIED REGISTERED

## 2022-10-28 PROCEDURE — 47563 LAPARO CHOLECYSTECTOMY/GRAPH: CPT | Performed by: SPECIALIST/TECHNOLOGIST, OTHER

## 2022-10-28 PROCEDURE — 25010000002 CEFAZOLIN PER 500 MG: Performed by: SURGERY

## 2022-10-28 PROCEDURE — 25010000002 FENTANYL CITRATE (PF) 100 MCG/2ML SOLUTION: Performed by: NURSE ANESTHETIST, CERTIFIED REGISTERED

## 2022-10-28 PROCEDURE — 82962 GLUCOSE BLOOD TEST: CPT

## 2022-10-28 PROCEDURE — 47563 LAPARO CHOLECYSTECTOMY/GRAPH: CPT | Performed by: SURGERY

## 2022-10-28 PROCEDURE — 88304 TISSUE EXAM BY PATHOLOGIST: CPT | Performed by: SURGERY

## 2022-10-28 PROCEDURE — 25010000002 PROPOFOL 10 MG/ML EMULSION: Performed by: NURSE ANESTHETIST, CERTIFIED REGISTERED

## 2022-10-28 PROCEDURE — 25010000002 ONDANSETRON PER 1 MG: Performed by: NURSE ANESTHETIST, CERTIFIED REGISTERED

## 2022-10-28 PROCEDURE — 25010000002 MAGNESIUM SULFATE PER 500 MG OF MAGNESIUM: Performed by: NURSE ANESTHETIST, CERTIFIED REGISTERED

## 2022-10-28 PROCEDURE — 74300 X-RAY BILE DUCTS/PANCREAS: CPT

## 2022-10-28 PROCEDURE — 25010000002 HYDROMORPHONE 1 MG/ML SOLUTION: Performed by: NURSE ANESTHETIST, CERTIFIED REGISTERED

## 2022-10-28 PROCEDURE — 25010000002 DEXAMETHASONE SODIUM PHOSPHATE 20 MG/5ML SOLUTION: Performed by: NURSE ANESTHETIST, CERTIFIED REGISTERED

## 2022-10-28 PROCEDURE — 25010000002 IOPAMIDOL 61 % SOLUTION: Performed by: SURGERY

## 2022-10-28 PROCEDURE — S0260 H&P FOR SURGERY: HCPCS | Performed by: SURGERY

## 2022-10-28 DEVICE — LIGAMAX 5 MM ENDOSCOPIC MULTIPLE CLIP APPLIER
Type: IMPLANTABLE DEVICE | Site: ABDOMEN | Status: FUNCTIONAL
Brand: LIGAMAX

## 2022-10-28 RX ORDER — MAGNESIUM SULFATE HEPTAHYDRATE 500 MG/ML
INJECTION, SOLUTION INTRAMUSCULAR; INTRAVENOUS AS NEEDED
Status: DISCONTINUED | OUTPATIENT
Start: 2022-10-28 | End: 2022-10-28 | Stop reason: SURG

## 2022-10-28 RX ORDER — HYDROMORPHONE HYDROCHLORIDE 1 MG/ML
0.5 INJECTION, SOLUTION INTRAMUSCULAR; INTRAVENOUS; SUBCUTANEOUS
Status: DISCONTINUED | OUTPATIENT
Start: 2022-10-28 | End: 2022-10-28 | Stop reason: HOSPADM

## 2022-10-28 RX ORDER — FAMOTIDINE 10 MG/ML
20 INJECTION, SOLUTION INTRAVENOUS ONCE
Status: COMPLETED | OUTPATIENT
Start: 2022-10-28 | End: 2022-10-28

## 2022-10-28 RX ORDER — GLYCOPYRROLATE 0.2 MG/ML
INJECTION INTRAMUSCULAR; INTRAVENOUS AS NEEDED
Status: DISCONTINUED | OUTPATIENT
Start: 2022-10-28 | End: 2022-10-28 | Stop reason: SURG

## 2022-10-28 RX ORDER — ONDANSETRON 2 MG/ML
4 INJECTION INTRAMUSCULAR; INTRAVENOUS ONCE AS NEEDED
Status: DISCONTINUED | OUTPATIENT
Start: 2022-10-28 | End: 2022-10-28 | Stop reason: HOSPADM

## 2022-10-28 RX ORDER — MAGNESIUM HYDROXIDE 1200 MG/15ML
LIQUID ORAL AS NEEDED
Status: DISCONTINUED | OUTPATIENT
Start: 2022-10-28 | End: 2022-10-28 | Stop reason: HOSPADM

## 2022-10-28 RX ORDER — SODIUM CHLORIDE 0.9 % (FLUSH) 0.9 %
10 SYRINGE (ML) INJECTION EVERY 12 HOURS SCHEDULED
Status: DISCONTINUED | OUTPATIENT
Start: 2022-10-28 | End: 2022-10-28 | Stop reason: HOSPADM

## 2022-10-28 RX ORDER — PROPOFOL 10 MG/ML
VIAL (ML) INTRAVENOUS AS NEEDED
Status: DISCONTINUED | OUTPATIENT
Start: 2022-10-28 | End: 2022-10-28 | Stop reason: SURG

## 2022-10-28 RX ORDER — HYDRALAZINE HYDROCHLORIDE 20 MG/ML
5 INJECTION INTRAMUSCULAR; INTRAVENOUS
Status: DISCONTINUED | OUTPATIENT
Start: 2022-10-28 | End: 2022-10-28 | Stop reason: HOSPADM

## 2022-10-28 RX ORDER — EPHEDRINE SULFATE 50 MG/ML
INJECTION, SOLUTION INTRAVENOUS AS NEEDED
Status: DISCONTINUED | OUTPATIENT
Start: 2022-10-28 | End: 2022-10-28 | Stop reason: SURG

## 2022-10-28 RX ORDER — DIPHENHYDRAMINE HYDROCHLORIDE 50 MG/ML
12.5 INJECTION INTRAMUSCULAR; INTRAVENOUS
Status: DISCONTINUED | OUTPATIENT
Start: 2022-10-28 | End: 2022-10-28 | Stop reason: HOSPADM

## 2022-10-28 RX ORDER — BUPIVACAINE HYDROCHLORIDE 2.5 MG/ML
INJECTION, SOLUTION EPIDURAL; INFILTRATION; INTRACAUDAL AS NEEDED
Status: DISCONTINUED | OUTPATIENT
Start: 2022-10-28 | End: 2022-10-28 | Stop reason: HOSPADM

## 2022-10-28 RX ORDER — SODIUM CHLORIDE 0.9 % (FLUSH) 0.9 %
3-10 SYRINGE (ML) INJECTION AS NEEDED
Status: DISCONTINUED | OUTPATIENT
Start: 2022-10-28 | End: 2022-10-28 | Stop reason: HOSPADM

## 2022-10-28 RX ORDER — HYDROCODONE BITARTRATE AND ACETAMINOPHEN 7.5; 325 MG/1; MG/1
1 TABLET ORAL ONCE AS NEEDED
Status: COMPLETED | OUTPATIENT
Start: 2022-10-28 | End: 2022-10-28

## 2022-10-28 RX ORDER — FLUMAZENIL 0.1 MG/ML
0.2 INJECTION INTRAVENOUS AS NEEDED
Status: DISCONTINUED | OUTPATIENT
Start: 2022-10-28 | End: 2022-10-28 | Stop reason: HOSPADM

## 2022-10-28 RX ORDER — ACETAMINOPHEN 325 MG/1
650 TABLET ORAL ONCE AS NEEDED
Status: DISCONTINUED | OUTPATIENT
Start: 2022-10-28 | End: 2022-10-28 | Stop reason: HOSPADM

## 2022-10-28 RX ORDER — FENTANYL CITRATE 50 UG/ML
50 INJECTION, SOLUTION INTRAMUSCULAR; INTRAVENOUS
Status: DISCONTINUED | OUTPATIENT
Start: 2022-10-28 | End: 2022-10-28 | Stop reason: HOSPADM

## 2022-10-28 RX ORDER — DIPHENHYDRAMINE HCL 25 MG
25 CAPSULE ORAL
Status: DISCONTINUED | OUTPATIENT
Start: 2022-10-28 | End: 2022-10-28 | Stop reason: HOSPADM

## 2022-10-28 RX ORDER — SODIUM CHLORIDE 0.9 % (FLUSH) 0.9 %
10 SYRINGE (ML) INJECTION AS NEEDED
Status: DISCONTINUED | OUTPATIENT
Start: 2022-10-28 | End: 2022-10-28 | Stop reason: HOSPADM

## 2022-10-28 RX ORDER — LIDOCAINE HYDROCHLORIDE 20 MG/ML
INJECTION, SOLUTION INFILTRATION; PERINEURAL AS NEEDED
Status: DISCONTINUED | OUTPATIENT
Start: 2022-10-28 | End: 2022-10-28 | Stop reason: SURG

## 2022-10-28 RX ORDER — CEFAZOLIN SODIUM IN 0.9 % NACL 3 G/100 ML
3 INTRAVENOUS SOLUTION, PIGGYBACK (ML) INTRAVENOUS ONCE
Status: COMPLETED | OUTPATIENT
Start: 2022-10-28 | End: 2022-10-28

## 2022-10-28 RX ORDER — ROCURONIUM BROMIDE 10 MG/ML
INJECTION, SOLUTION INTRAVENOUS AS NEEDED
Status: DISCONTINUED | OUTPATIENT
Start: 2022-10-28 | End: 2022-10-28 | Stop reason: SURG

## 2022-10-28 RX ORDER — ACETAMINOPHEN 650 MG/1
650 SUPPOSITORY RECTAL ONCE AS NEEDED
Status: DISCONTINUED | OUTPATIENT
Start: 2022-10-28 | End: 2022-10-28 | Stop reason: HOSPADM

## 2022-10-28 RX ORDER — HYDROCODONE BITARTRATE AND ACETAMINOPHEN 5; 325 MG/1; MG/1
1 TABLET ORAL EVERY 6 HOURS PRN
Qty: 20 TABLET | Refills: 0 | Status: SHIPPED | OUTPATIENT
Start: 2022-10-28 | End: 2022-11-09

## 2022-10-28 RX ORDER — SUCCINYLCHOLINE/SOD CL,ISO/PF 200MG/10ML
SYRINGE (ML) INTRAVENOUS AS NEEDED
Status: DISCONTINUED | OUTPATIENT
Start: 2022-10-28 | End: 2022-10-28 | Stop reason: SURG

## 2022-10-28 RX ORDER — OXYCODONE AND ACETAMINOPHEN 7.5; 325 MG/1; MG/1
1 TABLET ORAL EVERY 4 HOURS PRN
Status: DISCONTINUED | OUTPATIENT
Start: 2022-10-28 | End: 2022-10-28 | Stop reason: HOSPADM

## 2022-10-28 RX ORDER — SODIUM CHLORIDE 9 MG/ML
INJECTION, SOLUTION INTRAVENOUS AS NEEDED
Status: DISCONTINUED | OUTPATIENT
Start: 2022-10-28 | End: 2022-10-28 | Stop reason: HOSPADM

## 2022-10-28 RX ORDER — PROMETHAZINE HYDROCHLORIDE 25 MG/1
25 TABLET ORAL ONCE AS NEEDED
Status: DISCONTINUED | OUTPATIENT
Start: 2022-10-28 | End: 2022-10-28 | Stop reason: HOSPADM

## 2022-10-28 RX ORDER — ONDANSETRON 2 MG/ML
INJECTION INTRAMUSCULAR; INTRAVENOUS AS NEEDED
Status: DISCONTINUED | OUTPATIENT
Start: 2022-10-28 | End: 2022-10-28 | Stop reason: SURG

## 2022-10-28 RX ORDER — NALOXONE HCL 0.4 MG/ML
0.2 VIAL (ML) INJECTION AS NEEDED
Status: DISCONTINUED | OUTPATIENT
Start: 2022-10-28 | End: 2022-10-28 | Stop reason: HOSPADM

## 2022-10-28 RX ORDER — PROMETHAZINE HYDROCHLORIDE 25 MG/1
25 SUPPOSITORY RECTAL ONCE AS NEEDED
Status: DISCONTINUED | OUTPATIENT
Start: 2022-10-28 | End: 2022-10-28 | Stop reason: HOSPADM

## 2022-10-28 RX ORDER — SODIUM CHLORIDE, SODIUM LACTATE, POTASSIUM CHLORIDE, CALCIUM CHLORIDE 600; 310; 30; 20 MG/100ML; MG/100ML; MG/100ML; MG/100ML
9 INJECTION, SOLUTION INTRAVENOUS CONTINUOUS
Status: DISCONTINUED | OUTPATIENT
Start: 2022-10-28 | End: 2022-10-28 | Stop reason: HOSPADM

## 2022-10-28 RX ORDER — SODIUM CHLORIDE 0.9 % (FLUSH) 0.9 %
3 SYRINGE (ML) INJECTION EVERY 12 HOURS SCHEDULED
Status: DISCONTINUED | OUTPATIENT
Start: 2022-10-28 | End: 2022-10-28 | Stop reason: HOSPADM

## 2022-10-28 RX ORDER — LIDOCAINE HYDROCHLORIDE 10 MG/ML
0.5 INJECTION, SOLUTION EPIDURAL; INFILTRATION; INTRACAUDAL; PERINEURAL ONCE AS NEEDED
Status: DISCONTINUED | OUTPATIENT
Start: 2022-10-28 | End: 2022-10-28 | Stop reason: HOSPADM

## 2022-10-28 RX ORDER — FENTANYL CITRATE 50 UG/ML
INJECTION, SOLUTION INTRAMUSCULAR; INTRAVENOUS AS NEEDED
Status: DISCONTINUED | OUTPATIENT
Start: 2022-10-28 | End: 2022-10-28 | Stop reason: SURG

## 2022-10-28 RX ORDER — LABETALOL HYDROCHLORIDE 5 MG/ML
5 INJECTION, SOLUTION INTRAVENOUS
Status: DISCONTINUED | OUTPATIENT
Start: 2022-10-28 | End: 2022-10-28 | Stop reason: HOSPADM

## 2022-10-28 RX ORDER — DEXAMETHASONE SODIUM PHOSPHATE 4 MG/ML
INJECTION, SOLUTION INTRA-ARTICULAR; INTRALESIONAL; INTRAMUSCULAR; INTRAVENOUS; SOFT TISSUE AS NEEDED
Status: DISCONTINUED | OUTPATIENT
Start: 2022-10-28 | End: 2022-10-28 | Stop reason: SURG

## 2022-10-28 RX ORDER — EPHEDRINE SULFATE 50 MG/ML
5 INJECTION, SOLUTION INTRAVENOUS ONCE AS NEEDED
Status: DISCONTINUED | OUTPATIENT
Start: 2022-10-28 | End: 2022-10-28 | Stop reason: HOSPADM

## 2022-10-28 RX ADMIN — GLYCOPYRROLATE 0.2 MG: 0.2 INJECTION INTRAMUSCULAR; INTRAVENOUS at 08:26

## 2022-10-28 RX ADMIN — FENTANYL CITRATE 100 MCG: 50 INJECTION, SOLUTION INTRAMUSCULAR; INTRAVENOUS at 08:05

## 2022-10-28 RX ADMIN — ONDANSETRON 4 MG: 2 INJECTION INTRAMUSCULAR; INTRAVENOUS at 08:53

## 2022-10-28 RX ADMIN — MAGNESIUM SULFATE HEPTAHYDRATE 2 G: 500 INJECTION, SOLUTION INTRAMUSCULAR; INTRAVENOUS at 08:05

## 2022-10-28 RX ADMIN — FAMOTIDINE 20 MG: 10 INJECTION INTRAVENOUS at 07:48

## 2022-10-28 RX ADMIN — PROPOFOL 200 MG: 10 INJECTION, EMULSION INTRAVENOUS at 08:09

## 2022-10-28 RX ADMIN — FENTANYL CITRATE 50 MCG: 50 INJECTION INTRAMUSCULAR; INTRAVENOUS at 09:31

## 2022-10-28 RX ADMIN — SUGAMMADEX 400 MG: 100 INJECTION, SOLUTION INTRAVENOUS at 08:57

## 2022-10-28 RX ADMIN — SODIUM CHLORIDE, POTASSIUM CHLORIDE, SODIUM LACTATE AND CALCIUM CHLORIDE 9 ML/HR: 600; 310; 30; 20 INJECTION, SOLUTION INTRAVENOUS at 07:48

## 2022-10-28 RX ADMIN — ROCURONIUM BROMIDE 50 MG: 10 INJECTION, SOLUTION INTRAVENOUS at 08:19

## 2022-10-28 RX ADMIN — CEFAZOLIN 3 G: 10 INJECTION, POWDER, FOR SOLUTION INTRAVENOUS at 07:50

## 2022-10-28 RX ADMIN — DEXAMETHASONE SODIUM PHOSPHATE 10 MG: 4 INJECTION, SOLUTION INTRAMUSCULAR; INTRAVENOUS at 08:31

## 2022-10-28 RX ADMIN — HYDROMORPHONE HYDROCHLORIDE 0.5 MG: 1 INJECTION, SOLUTION INTRAMUSCULAR; INTRAVENOUS; SUBCUTANEOUS at 09:03

## 2022-10-28 RX ADMIN — EPHEDRINE SULFATE 10 MG: 50 INJECTION INTRAVENOUS at 08:23

## 2022-10-28 RX ADMIN — HYDROCODONE BITARTRATE AND ACETAMINOPHEN 1 TABLET: 7.5; 325 TABLET ORAL at 09:44

## 2022-10-28 RX ADMIN — EPHEDRINE SULFATE 10 MG: 50 INJECTION INTRAVENOUS at 08:26

## 2022-10-28 RX ADMIN — Medication 200 MG: at 08:10

## 2022-10-28 RX ADMIN — HYDROMORPHONE HYDROCHLORIDE 0.5 MG: 1 INJECTION, SOLUTION INTRAMUSCULAR; INTRAVENOUS; SUBCUTANEOUS at 09:11

## 2022-10-28 RX ADMIN — LIDOCAINE HYDROCHLORIDE 100 MG: 20 INJECTION, SOLUTION INFILTRATION; PERINEURAL at 08:09

## 2022-10-28 NOTE — ANESTHESIA POSTPROCEDURE EVALUATION
Patient: Jessica Ames    Procedure Summary     Date: 10/28/22 Room / Location: [facility] OR [address] [place] [address] MAIN OR    Anesthesia Start: 0757 Anesthesia Stop: 0918    Procedure: CHOLECYSTECTOMY LAPAROSCOPIC INTRAOPERATIVE CHOLANGIOGRAM (Abdomen) Diagnosis:       Calculus of gallbladder with chronic cholecystitis without obstruction      (Calculus of gallbladder with chronic cholecystitis without obstruction [K80.10])    Surgeons: Melecio Gan MD Provider: Zoraida Sagastume MD    Anesthesia Type: general ASA Status: 3          Anesthesia Type: general    Vitals  Vitals Value Taken Time   /73 10/28/22 1001   Temp 36.6 °C (97.9 °F) 10/28/22 0915   Pulse 55 10/28/22 1001   Resp 18 10/28/22 1000   SpO2 97 % 10/28/22 1000   Vitals shown include unvalidated device data.        Post Anesthesia Care and Evaluation    Patient location during evaluation: PHASE II  Patient participation: complete - patient participated  Level of consciousness: awake  Pain score: 1  Pain management: satisfactory to patient  Anesthetic complications: No anesthetic complications  PONV Status: none  Cardiovascular status: acceptable  Respiratory status: acceptable  Hydration status: acceptable

## 2022-10-28 NOTE — ANESTHESIA PROCEDURE NOTES
Airway  Urgency: elective    Airway not difficult    General Information and Staff    Patient location during procedure: OR  CRNA/CAA: Neyda Sneed CRNA    Indications and Patient Condition  Indications for airway management: airway protection    Preoxygenated: yes  Mask difficulty assessment: 1 - vent by mask    Final Airway Details  Final airway type: endotracheal airway      Successful airway: ETT  Cuffed: yes   Successful intubation technique: direct laryngoscopy  Facilitating devices/methods: cricoid pressure  Endotracheal tube insertion site: oral  Blade: Smith  Blade size: 2  ETT size (mm): 7.0  Cormack-Lehane Classification: grade I - full view of glottis  Placement verified by: chest auscultation and capnometry   Cuff volume (mL): 3  Measured from: lips  ETT/EBT  to lips (cm): 21  Number of attempts at approach: 1  Assessment: lips, teeth, and gum same as pre-op    Additional Comments  EBBS: ETCO2+: Atraumatic intubation

## 2022-10-31 LAB
LAB AP CASE REPORT: NORMAL
PATH REPORT.FINAL DX SPEC: NORMAL
PATH REPORT.GROSS SPEC: NORMAL

## 2022-11-01 ENCOUNTER — APPOINTMENT (OUTPATIENT)
Dept: DIABETES SERVICES | Facility: HOSPITAL | Age: 77
End: 2022-11-01

## 2022-11-08 ENCOUNTER — APPOINTMENT (OUTPATIENT)
Dept: DIABETES SERVICES | Facility: HOSPITAL | Age: 77
End: 2022-11-08

## 2022-11-09 ENCOUNTER — OFFICE VISIT (OUTPATIENT)
Dept: SURGERY | Facility: CLINIC | Age: 77
End: 2022-11-09

## 2022-11-09 VITALS — BODY MASS INDEX: 44.32 KG/M2 | HEIGHT: 65 IN | WEIGHT: 266 LBS

## 2022-11-09 DIAGNOSIS — K80.10 CALCULUS OF GALLBLADDER WITH CHRONIC CHOLECYSTITIS WITHOUT OBSTRUCTION: Primary | ICD-10-CM

## 2022-11-09 PROCEDURE — 99024 POSTOP FOLLOW-UP VISIT: CPT | Performed by: SURGERY

## 2022-11-09 NOTE — PROGRESS NOTES
Postop cholecystectomy    Feels well, no significant pain, preop symptoms improved, tolerating a diet.    Objective:  BMI 44.3  General: Awake and alert without distress  Eyes: No icterus  Abdomen: Soft and benign, incisions are well-healed    Pathology with benign moderate chronic calculus cholecystitis    Assessment and plan:  -Postop cholecystectomy, recovering well with no evidence of complication to this point  -Follow-up as needed    Melecio Gan MD  General and Endoscopic Surgery  [facility]    [address], [address]  [place], [place], [address]  P: [phone]  F: [phone]

## 2022-11-14 ENCOUNTER — OFFICE VISIT (OUTPATIENT)
Dept: GASTROENTEROLOGY | Facility: CLINIC | Age: 77
End: 2022-11-14

## 2022-11-14 ENCOUNTER — TELEPHONE (OUTPATIENT)
Dept: GASTROENTEROLOGY | Facility: CLINIC | Age: 77
End: 2022-11-14

## 2022-11-14 VITALS
DIASTOLIC BLOOD PRESSURE: 72 MMHG | HEART RATE: 53 BPM | HEIGHT: 65 IN | SYSTOLIC BLOOD PRESSURE: 122 MMHG | BODY MASS INDEX: 44.68 KG/M2 | WEIGHT: 268.2 LBS | TEMPERATURE: 95.2 F

## 2022-11-14 DIAGNOSIS — R13.10 DYSPHAGIA, UNSPECIFIED TYPE: ICD-10-CM

## 2022-11-14 DIAGNOSIS — R10.13 EPIGASTRIC PAIN: Primary | ICD-10-CM

## 2022-11-14 DIAGNOSIS — R10.13 DYSPEPSIA: ICD-10-CM

## 2022-11-14 PROCEDURE — 99214 OFFICE O/P EST MOD 30 MIN: CPT | Performed by: NURSE PRACTITIONER

## 2022-11-14 NOTE — TELEPHONE ENCOUNTER
ILDA patient via telephone for. Scheduled 11/30/2022 with arrival time of [facility] WILL CALL WITH TIME. Prep paperwork mailed to verified address on file. Patient advised arrival time may change based on [facility] guidelines. ILDA APRIL PEREZ

## 2022-11-14 NOTE — PROGRESS NOTES
"Chief Complaint   Patient presents with   • Abdominal Pain       HPI    Jessica Ames is a  77 y.o. female here to establish care as a new patient for complaints of abdominal pain.    This patient will also follow with Dr. Cannon.    Past medical history reviewed as below pertinent for diabetes, clotting disorder, GERD, anemia, atrial fibrillation, and PE.    Status post lap iris in October performed by Dr. Gan.    Patient complains of epigastric discomfort that can radiate up into the sternal area with symptoms of dyspepsia and rare episodes of dysphagia with solids where she feels food lodged at the sternal notch.  She is on omeprazole 40 mg twice daily.  She is been on the particular PPI regimen for a number of years.  Denies nausea, vomiting, poor appetite, weight loss.  BMI 44.63.  She is not on antireflux diet.    No lower GI complaints.    Reports normal colonoscopy in 2018 with plans for follow-up 10 years later (report unavailable for review)    Recent hemoglobin and LFTs normal.    2018 EGD (Dr. Osuna) w/ erythema in the GE junction, possible columnar lined esophagus.  Hiatal hernia.  Polyps in the stomach body.  Pathology unavailable.    Past Medical History:   Diagnosis Date   • Abnormal ECG 2005   • Arthritis    • Bleeding disorder (HCC) 2013    Vickery factor 5   • Cholelithiasis Oct. 2022   • Clotting disorder (HCC) 2013   • Coronary artery disease 2015   • Diabetes mellitus (HCC) 2022   • Disease of thyroid gland    • DVT (deep venous thrombosis) (HCC) 2013    right leg; s/p knee replacement; was on xarelto and now baby aspirin; followed by Dr. Parminder Mejia     • Factor V Leiden ([facility])    • Fibrocystic breast 1986   • First degree AV block 12/07/2012   • GERD (gastroesophageal reflux disease) 12/07/2012   • Hiatal hernia    • History of colon polyps     \"9 REMOVED\"   • Hyperlipidemia    • Hypertension    • IFG (impaired fasting glucose)    • Iron deficiency anemia     sees Dr. Clem Bowman  "   • Left anterior fascicular block 12/07/2012   • Lower extremity edema    • Obesity 12/07/2012   • ERIC (obstructive sleep apnea)     compliant with CPAP machine    • PAF (paroxysmal atrial fibrillation) (HCC) 11/2015    day after colonoscopy went into atrial fibrillation; was on Xarelto and now baby aspirin    • Pulmonary embolism (HCC) 2013    also had DVT    • PVC's (premature ventricular contractions) 12/07/2012   • SSS (sick sinus syndrome) ([facility])    • Venous insufficiency     followed by Dr. Parminder Mejia        Past Surgical History:   Procedure Laterality Date   • BREAST SURGERY      reduction   • CATH LAB PROCEDURE     • CHOLECYSTECTOMY  Oct. 2022   • CHOLECYSTECTOMY WITH INTRAOPERATIVE CHOLANGIOGRAM N/A 10/28/2022    Procedure: CHOLECYSTECTOMY LAPAROSCOPIC INTRAOPERATIVE CHOLANGIOGRAM;  Surgeon: Melecio Gan MD;  Location: [facility];  Service: General;  Laterality: N/A;   • COLONOSCOPY  2018    dr. torres   • EYE SURGERY  2015   • HAND SURGERY     • HEMORRHOIDECTOMY  1984   • HYSTERECTOMY     • REDUCTION MAMMAPLASTY  1986   • ROTATOR CUFF REPAIR     • SHOULDER SURGERY Bilateral    • TOTAL KNEE ARTHROPLASTY     • UPPER GASTROINTESTINAL ENDOSCOPY  2018    13 polyps removed from stomach.. dr torres       Scheduled Meds:     Continuous Infusions: No current facility-administered medications for this visit.      PRN Meds:     Allergies   Allergen Reactions   • Adenosine Other (See Comments)     Paralyzed lungs and vocal chords   • Lisinopril Cough   • Celecoxib Palpitations     LE EDEMA   • Naproxen Palpitations     LE EDEMA    ALL NSAIDS   • Risedronate Palpitations   • Risedronate Sodium Palpitations     LE EDEMA   • Sulfa Antibiotics Hives       Social History     Socioeconomic History   • Marital status:    Tobacco Use   • Smoking status: Never   • Smokeless tobacco: Never   • Tobacco comments:     minimal caffeine   Vaping Use   • Vaping Use: Never used   Substance and Sexual Activity   •  "Alcohol use: Not Currently     Comment: \" once month\"   • Drug use: Never   • Sexual activity: Yes     Partners: Male     Comment: NA       Family History   Problem Relation Age of Onset   • Stroke Mother    • Diabetes Mother    • Hypertension Mother    • Uterine cancer Mother    • Arthritis Mother    • Cancer Mother    • Miscarriages / Stillbirths Mother    • Hypertension Father    • Heart attack Father    • Emphysema Father    • Alcohol abuse Father    • COPD Father    • Diabetes Father    • Heart disease Father    • Diabetes Sister    • Hypertension Sister    • Hearing loss Sister    • Stroke Brother    • Diabetes Brother    • Hypertension Brother    • Mental illness Maternal Grandmother    • Malig Hyperthermia Neg Hx        Review of Systems   Constitutional: Negative for activity change, appetite change, fatigue and unexpected weight change.   HENT: Positive for trouble swallowing.    Eyes: Negative.    Respiratory: Negative.    Cardiovascular: Negative.    Gastrointestinal: Positive for abdominal pain. Negative for abdominal distention, anal bleeding, blood in stool, constipation, diarrhea, nausea, rectal pain and vomiting.   Endocrine: Negative.    Genitourinary: Negative.    Musculoskeletal: Negative.    Allergic/Immunologic: Negative.    Neurological: Negative.    Hematological: Negative.    Psychiatric/Behavioral: Negative.        Vitals:    11/14/22 1001   BP: 122/72   Pulse: 53   Temp: 95.2 °F (35.1 °C)       Physical Exam  Constitutional:       Appearance: She is well-developed.   Abdominal:      General: Bowel sounds are normal. There is no distension.      Palpations: Abdomen is soft. There is no mass.      Tenderness: There is no abdominal tenderness. There is no guarding.      Hernia: No hernia is present.   Skin:     General: Skin is warm and dry.      Capillary Refill: Capillary refill takes less than 2 seconds.   Neurological:      Mental Status: She is alert and oriented to person, place, and " time.   Psychiatric:         Behavior: Behavior normal.     Assessment    Diagnoses and all orders for this visit:    1. Epigastric pain (Primary)  -     Case Request; Standing  -     Obtain Informed Consent; Standing  -     Case Request    2. Dyspepsia  -     Case Request; Standing  -     Obtain Informed Consent; Standing  -     Case Request    3. Dysphagia, unspecified type       Plan    EGD with Dr. Cannon for the above-mentioned symptoms  Stop omeprazole  Start Protonix 40 mg p.o. twice daily  Follow an antireflux diet  Antireflux measures and dietary modifications reviewed. Low acid diet reviewed. Keep head of bed elevated. Stop eating/drinking at least 3 hours prior to bedtime. Eliminate caffeine and carbonated beverages.  Weight loss encouraged if BMI over 25.  Obtain a copy of prior colonoscopy report for review to establish timeline for screening  Further recommendations and follow-up pending endoscopic finding          MIRA Quintanilla  [facility]  [address] [place] [address]  Office: [phone]

## 2022-11-15 ENCOUNTER — APPOINTMENT (OUTPATIENT)
Dept: DIABETES SERVICES | Facility: HOSPITAL | Age: 77
End: 2022-11-15

## 2022-11-15 ENCOUNTER — TELEPHONE (OUTPATIENT)
Dept: GASTROENTEROLOGY | Facility: CLINIC | Age: 77
End: 2022-11-15

## 2022-11-15 NOTE — TELEPHONE ENCOUNTER
----- Message from MIRA Quintanilla sent at 11/14/2022 10:36 AM EST -----  I need a copy of her old colonoscopy report from Dr. Osuna's office at [facility]

## 2022-11-17 DIAGNOSIS — E78.2 MIXED HYPERLIPIDEMIA: ICD-10-CM

## 2022-11-17 DIAGNOSIS — K21.9 GASTROESOPHAGEAL REFLUX DISEASE WITHOUT ESOPHAGITIS: ICD-10-CM

## 2022-11-17 DIAGNOSIS — E11.9 TYPE 2 DIABETES MELLITUS WITHOUT COMPLICATION, WITHOUT LONG-TERM CURRENT USE OF INSULIN: ICD-10-CM

## 2022-11-17 DIAGNOSIS — E66.01 CLASS 3 SEVERE OBESITY DUE TO EXCESS CALORIES WITH BODY MASS INDEX (BMI) OF 40.0 TO 44.9 IN ADULT, UNSPECIFIED WHETHER SERIOUS COMORBIDITY PRESENT: ICD-10-CM

## 2022-11-17 DIAGNOSIS — G47.33 OSA (OBSTRUCTIVE SLEEP APNEA): ICD-10-CM

## 2022-11-17 DIAGNOSIS — D68.51 FACTOR 5 LEIDEN MUTATION, HETEROZYGOUS: ICD-10-CM

## 2022-11-17 DIAGNOSIS — I48.0 PAROXYSMAL ATRIAL FIBRILLATION: ICD-10-CM

## 2022-11-17 DIAGNOSIS — I10 PRIMARY HYPERTENSION: ICD-10-CM

## 2022-11-17 DIAGNOSIS — R94.6 THYROID FUNCTION TEST ABNORMAL: ICD-10-CM

## 2022-11-22 LAB
ALBUMIN SERPL-MCNC: 4.3 G/DL (ref 3.5–5.2)
ALBUMIN/CREAT UR: 5 MG/G CREAT (ref 0–29)
ALBUMIN/GLOB SERPL: 2 G/DL
ALP SERPL-CCNC: 77 U/L (ref 39–117)
ALT SERPL-CCNC: 11 U/L (ref 1–33)
APPEARANCE UR: ABNORMAL
AST SERPL-CCNC: 15 U/L (ref 1–32)
BACTERIA #/AREA URNS HPF: ABNORMAL /HPF
BILIRUB SERPL-MCNC: 0.6 MG/DL (ref 0–1.2)
BILIRUB UR QL STRIP: NEGATIVE
BUN SERPL-MCNC: 12 MG/DL (ref 8–23)
BUN/CREAT SERPL: 12.8 (ref 7–25)
CALCIUM SERPL-MCNC: 9 MG/DL (ref 8.6–10.5)
CASTS URNS MICRO: ABNORMAL
CHLORIDE SERPL-SCNC: 104 MMOL/L (ref 98–107)
CO2 SERPL-SCNC: 25.7 MMOL/L (ref 22–29)
COLOR UR: YELLOW
CREAT SERPL-MCNC: 0.94 MG/DL (ref 0.57–1)
CREAT UR-MCNC: 144.7 MG/DL
EGFRCR SERPLBLD CKD-EPI 2021: 62.6 ML/MIN/1.73
EPI CELLS #/AREA URNS HPF: ABNORMAL /HPF
GLOBULIN SER CALC-MCNC: 2.1 GM/DL
GLUCOSE SERPL-MCNC: 116 MG/DL (ref 65–99)
GLUCOSE UR QL STRIP: NEGATIVE
HBA1C MFR BLD: 6.3 % (ref 4.8–5.6)
HGB UR QL STRIP: ABNORMAL
KETONES UR QL STRIP: NEGATIVE
LEUKOCYTE ESTERASE UR QL STRIP: ABNORMAL
MICROALBUMIN UR-MCNC: 6.9 UG/ML
NITRITE UR QL STRIP: NEGATIVE
PH UR STRIP: 6 [PH] (ref 5–8)
POTASSIUM SERPL-SCNC: 4.1 MMOL/L (ref 3.5–5.2)
PROT SERPL-MCNC: 6.4 G/DL (ref 6–8.5)
PROT UR QL STRIP: NEGATIVE
RBC #/AREA URNS HPF: ABNORMAL /HPF
SODIUM SERPL-SCNC: 139 MMOL/L (ref 136–145)
SP GR UR STRIP: 1.02 (ref 1–1.03)
T3FREE SERPL-MCNC: 2.4 PG/ML (ref 2–4.4)
T4 FREE SERPL-MCNC: 1.21 NG/DL (ref 0.93–1.7)
TSH SERPL DL<=0.005 MIU/L-ACNC: 3.59 UIU/ML (ref 0.27–4.2)
UROBILINOGEN UR STRIP-MCNC: ABNORMAL MG/DL
WBC #/AREA URNS HPF: ABNORMAL /HPF

## 2022-11-30 ENCOUNTER — LAB REQUISITION (OUTPATIENT)
Dept: LAB | Facility: HOSPITAL | Age: 77
End: 2022-11-30

## 2022-11-30 ENCOUNTER — OUTSIDE FACILITY SERVICE (OUTPATIENT)
Dept: GASTROENTEROLOGY | Facility: CLINIC | Age: 77
End: 2022-11-30

## 2022-11-30 DIAGNOSIS — Z00.00 ENCOUNTER FOR GENERAL ADULT MEDICAL EXAMINATION WITHOUT ABNORMAL FINDINGS: ICD-10-CM

## 2022-11-30 PROCEDURE — 43239 EGD BIOPSY SINGLE/MULTIPLE: CPT | Performed by: INTERNAL MEDICINE

## 2022-11-30 PROCEDURE — 88305 TISSUE EXAM BY PATHOLOGIST: CPT | Performed by: INTERNAL MEDICINE

## 2022-11-30 PROCEDURE — 43450 DILATE ESOPHAGUS 1/MULT PASS: CPT | Performed by: INTERNAL MEDICINE

## 2022-11-30 NOTE — TELEPHONE ENCOUNTER
Records reviewed as follows:    EGD 2018 --erythema at the GE junction questionable Merchant's, hiatal hernia, polyps in the stomach body, normal duodenum.  Path was benign.    C-scope 2015 normal.

## 2022-12-01 LAB
LAB AP CASE REPORT: NORMAL
LAB AP CLINICAL INFORMATION: NORMAL
PATH REPORT.FINAL DX SPEC: NORMAL
PATH REPORT.GROSS SPEC: NORMAL

## 2022-12-02 ENCOUNTER — OFFICE VISIT (OUTPATIENT)
Dept: CARDIOLOGY | Facility: CLINIC | Age: 77
End: 2022-12-02

## 2022-12-02 VITALS
SYSTOLIC BLOOD PRESSURE: 130 MMHG | HEART RATE: 46 BPM | HEIGHT: 65 IN | DIASTOLIC BLOOD PRESSURE: 84 MMHG | BODY MASS INDEX: 44.92 KG/M2 | WEIGHT: 269.6 LBS

## 2022-12-02 DIAGNOSIS — D68.51 FACTOR 5 LEIDEN MUTATION, HETEROZYGOUS: ICD-10-CM

## 2022-12-02 DIAGNOSIS — I26.99 PE (PULMONARY THROMBOEMBOLISM): ICD-10-CM

## 2022-12-02 DIAGNOSIS — I48.0 PAROXYSMAL ATRIAL FIBRILLATION: Primary | ICD-10-CM

## 2022-12-02 DIAGNOSIS — I10 PRIMARY HYPERTENSION: ICD-10-CM

## 2022-12-02 DIAGNOSIS — I49.3 PVC'S (PREMATURE VENTRICULAR CONTRACTIONS): ICD-10-CM

## 2022-12-02 PROCEDURE — 93000 ELECTROCARDIOGRAM COMPLETE: CPT | Performed by: INTERNAL MEDICINE

## 2022-12-02 PROCEDURE — 99214 OFFICE O/P EST MOD 30 MIN: CPT | Performed by: INTERNAL MEDICINE

## 2022-12-02 NOTE — PROGRESS NOTES
"Date of Office Visit: 22  Encounter Provider: Jason Lo MD  Place of Service: [facility] CARDIOLOGY  Patient Name: Jessica Ames  :1945  [id number]    Chief Complaint   Patient presents with   • Atrial Fibrillation   :     HPI: Jessica Ames is a 77 y.o. female this is a lady that had followed with Dr. Adame but he is now retired and she is coming to see me.  She has a history of paroxysmal A. fib she had an episode in April of this year she took an extra metoprolol and eventually it went away she has an apple watch that she can tell when she goes into it she does not think she has been in it since she has sleep apnea and she wears her mask he has hypertension.  She is prediabetic.  She is morbidly obese and had DVT and a pulmonary embolus after knee replacement.  She has factor V Leiden.    She has been doing pretty well she has not had any chest pain she cannot do a lot of activity that is not a new thing because she gets tired and short of breath no PND no orthopnea no edema no bleeding difficulty at 1 point she had been on Xarelto but it was stopped years ago    Past Medical History:   Diagnosis Date   • Abnormal ECG    • Arthritis    • Bleeding disorder ([facility])     [place] factor 5   • Cholelithiasis Oct. 2022   • Clotting disorder ([facility])    • Coronary artery disease    • Diabetes mellitus ([facility])    • Disease of thyroid gland    • DVT (deep venous thrombosis) ([facility])     right leg; s/p knee replacement; was on xarelto and now baby aspirin; followed by Dr. Parminder Mejia     • Factor V Leiden ([facility])    • Fibrocystic breast    • First degree AV block 2012   • GERD (gastroesophageal reflux disease) 2012   • Hiatal hernia    • History of colon polyps     \"9 REMOVED\"   • Hyperlipidemia    • Hypertension    • IFG (impaired fasting glucose)    • Iron deficiency anemia     sees Dr. Clem Bowman    • Left anterior fascicular block " "12/07/2012   • Lower extremity edema    • Obesity 12/07/2012   • ERIC (obstructive sleep apnea)     compliant with CPAP machine    • PAF (paroxysmal atrial fibrillation) (HCC) 11/2015    day after colonoscopy went into atrial fibrillation; was on Xarelto and now baby aspirin    • Pulmonary embolism (HCC) 2013    also had DVT    • PVC's (premature ventricular contractions) 12/07/2012   • SSS (sick sinus syndrome) ([facility])    • Venous insufficiency     followed by Dr. Parminder Mejia        Past Surgical History:   Procedure Laterality Date   • BREAST SURGERY      reduction   • CATH LAB PROCEDURE     • CHOLECYSTECTOMY  Oct. 2022   • CHOLECYSTECTOMY WITH INTRAOPERATIVE CHOLANGIOGRAM N/A 10/28/2022    Procedure: CHOLECYSTECTOMY LAPAROSCOPIC INTRAOPERATIVE CHOLANGIOGRAM;  Surgeon: Melecio Gan MD;  Location: [facility] OR;  Service: General;  Laterality: N/A;   • COLONOSCOPY  2018    dr. torres   • EYE SURGERY  2015   • HAND SURGERY     • HEMORRHOIDECTOMY  1984   • HYSTERECTOMY     • REDUCTION MAMMAPLASTY  1986   • ROTATOR CUFF REPAIR     • SHOULDER SURGERY Bilateral    • TOTAL KNEE ARTHROPLASTY     • UPPER GASTROINTESTINAL ENDOSCOPY  2018    13 polyps removed from stomach.. dr torres       Social History     Socioeconomic History   • Marital status:    Tobacco Use   • Smoking status: Never   • Smokeless tobacco: Never   • Tobacco comments:     minimal caffeine   Vaping Use   • Vaping Use: Never used   Substance and Sexual Activity   • Alcohol use: Not Currently     Comment: \" once month\"   • Drug use: Never   • Sexual activity: Yes     Partners: Male     Comment: NA       Family History   Problem Relation Age of Onset   • Stroke Mother    • Diabetes Mother    • Hypertension Mother    • Uterine cancer Mother    • Arthritis Mother    • Cancer Mother    • Miscarriages / Stillbirths Mother    • Hypertension Father    • Heart attack Father    • Emphysema Father    • Alcohol abuse Father    • COPD Father    • " Diabetes Father    • Heart disease Father    • Diabetes Sister    • Hypertension Sister    • Hearing loss Sister    • Stroke Brother    • Diabetes Brother    • Hypertension Brother    • Mental illness Maternal Grandmother    • Malig Hyperthermia Neg Hx        Review of Systems   Constitutional: Negative for decreased appetite, fever, malaise/fatigue and weight loss.   HENT: Negative for nosebleeds.    Eyes: Negative for double vision.   Cardiovascular: Negative for chest pain, claudication, cyanosis, dyspnea on exertion, irregular heartbeat, leg swelling, near-syncope, orthopnea, palpitations, paroxysmal nocturnal dyspnea and syncope.   Respiratory: Negative for cough, hemoptysis and shortness of breath.    Hematologic/Lymphatic: Negative for bleeding problem.   Skin: Negative for rash.   Musculoskeletal: Negative for falls and myalgias.   Gastrointestinal: Negative for hematochezia, jaundice, melena, nausea and vomiting.   Genitourinary: Negative for hematuria.   Neurological: Negative for dizziness and seizures.   Psychiatric/Behavioral: Negative for altered mental status and memory loss.       Allergies   Allergen Reactions   • Adenosine Other (See Comments)     Paralyzed lungs and vocal chords   • Lisinopril Cough   • Celecoxib Palpitations     LE EDEMA   • Naproxen Palpitations     LE EDEMA    ALL NSAIDS   • Risedronate Palpitations   • Risedronate Sodium Palpitations     LE EDEMA   • Sulfa Antibiotics Hives         Current Outpatient Medications:   •  Acetaminophen (TYLENOL PO), Take  by mouth Daily As Needed., Disp: , Rfl:   •  Chlorpheniramine Maleate (ALLERGY PO), Take  by mouth., Disp: , Rfl:   •  Cholecalciferol 2000 units capsule, Take 2,000 Units by mouth Daily. One PO daily, Disp: 90 each, Rfl: 3  •  Cyanocobalamin (VITAMIN B-12) 1000 MCG sublingual tablet, Place 1,000 mcg under the tongue Daily. One SL daily (Patient taking differently: Place 1,000 mcg under the tongue Daily. TWICE A WEEK), Disp: 90  "each, Rfl: 3  •  ezetimibe (ZETIA) 10 MG tablet, Take 1 tablet by mouth Daily. for cholesterol, Disp: 90 tablet, Rfl: 3  •  levothyroxine (SYNTHROID, LEVOTHROID) 50 MCG tablet, Take 1 tablet by mouth Daily., Disp: 90 tablet, Rfl: 3  •  losartan (COZAAR) 50 MG tablet, Take 1 tablet by mouth Daily. For BP, Disp: 90 tablet, Rfl: 1  •  metoprolol succinate XL (TOPROL-XL) 25 MG 24 hr tablet, 1 PO once daily for heart, Disp: 90 tablet, Rfl: 3  •  omeprazole (priLOSEC) 40 MG capsule, Take 1 capsule by mouth 2 (Two) Times a Day., Disp: 180 capsule, Rfl: 3  •  sertraline (Zoloft) 50 MG tablet, one PO daily for stress, Disp: 90 tablet, Rfl: 3  •  apixaban (ELIQUIS) 5 MG tablet tablet, Take 1 tablet by mouth Every 12 (Twelve) Hours., Disp: 180 tablet, Rfl: 3      Objective:     Vitals:    12/02/22 0940   BP: 130/84   Pulse: (!) 46   Weight: 122 kg (269 lb 9.6 oz)   Height: 165.1 cm (65\")     Body mass index is 44.86 kg/m².    Constitutional:       Appearance: Well-developed.   Eyes:      General: No scleral icterus.  HENT:      Head: Normocephalic.   Neck:      Thyroid: No thyromegaly.      Vascular: No JVD.      Lymphadenopathy: No cervical adenopathy.   Pulmonary:      Effort: Pulmonary effort is normal.      Breath sounds: Normal breath sounds. No wheezing. No rales.   Cardiovascular:      Normal rate. Regular rhythm.      No gallop.   Edema:     Peripheral edema absent.   Abdominal:      Palpations: Abdomen is soft.      Tenderness: There is no abdominal tenderness.   Musculoskeletal: Normal range of motion. Skin:     General: Skin is warm and dry.      Findings: No rash.   Neurological:      Mental Status: Alert and oriented to person, place, and time.           ECG 12 Lead    Date/Time: 12/2/2022 10:42 AM  Performed by: Jason Lo MD  Authorized by: Jason Lo MD   Comparison: compared with previous ECG   Similar to previous ECG  Rhythm: sinus bradycardia  Conduction: right bundle branch block and left " anterior fascicular block    Clinical impression: abnormal EKG             Assessment:       Diagnosis Plan   1. Paroxysmal atrial fibrillation (HCC)        2. Primary hypertension        3. PVC's (premature ventricular contractions)        4. Factor 5 Leiden mutation, heterozygous (HCC)        5. PE (pulmonary thromboembolism) ([facility])               Plan:       Well I think she needs to be anticoagulated.  She is having paroxysmal A. fib she has a CHADS2 Vascor of 3+.  She does not have any history of bleeding she has factor V Leiden she is morbidly obese she has had a DVT she has had a pulmonary embolus.  She has not had any bleeding problems.  There is really not a role for aspirin here I Lissette stop the aspirin put her on Eliquis we talked about that at length in addition she is got a get smaller words she is really not can a change in how she is going to feel and I think her risk for A. fib is going to continue to be high and does not respond that well with ablation if she has morbid obesity.  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  So I would love to see her lose some weight love to see her get into the 230s will see I gave her the full-court press on that that and I will have her see Kelly in 6 months and see me in a year    Return in about 1 year (around 12/2/2023) for See Kelly Steel in 6 months.     As always, it has been a pleasure to participate in your patient's care.      Sincerely,       Jason Lo MD

## 2022-12-28 ENCOUNTER — OFFICE VISIT (OUTPATIENT)
Dept: FAMILY MEDICINE CLINIC | Facility: CLINIC | Age: 77
End: 2022-12-28

## 2022-12-28 VITALS
HEART RATE: 62 BPM | BODY MASS INDEX: 44.82 KG/M2 | HEIGHT: 65 IN | WEIGHT: 269 LBS | OXYGEN SATURATION: 94 % | RESPIRATION RATE: 18 BRPM | SYSTOLIC BLOOD PRESSURE: 152 MMHG | DIASTOLIC BLOOD PRESSURE: 72 MMHG | TEMPERATURE: 98.2 F

## 2022-12-28 DIAGNOSIS — I10 PRIMARY HYPERTENSION: ICD-10-CM

## 2022-12-28 DIAGNOSIS — N64.4 BREAST PAIN, RIGHT: Primary | ICD-10-CM

## 2022-12-28 DIAGNOSIS — I48.0 PAROXYSMAL ATRIAL FIBRILLATION: ICD-10-CM

## 2022-12-28 PROCEDURE — 99214 OFFICE O/P EST MOD 30 MIN: CPT | Performed by: NURSE PRACTITIONER

## 2022-12-28 NOTE — PROGRESS NOTES
Chief Complaint  Breast Problem (Pain in right breast.  Pain has been for about 10 day.   )    Subjective          Jessica presents to [facility]     As a 77 year old female c/o acute onset pain in her right breast for the past 5-7 days that is worsening.  She states the pain is aching and dull, and started under her arm, and is now on her inside of her breast around 1 o clock.  She has not noticed any swelling, lumps, bumps or dimpling.  She did have a negative mammogram 9/2022, she has no history of breast ca or family history.  She has no fatigue, weight loss, night sweats.  No nipple discharge.  No new activities or vaccinations  No sob or wheezing, no chest pain or pressure  She did start eliquis with her cardiologist last month-  No other new medication or changes    Answers for HPI/ROS submitted by the patient on 12/23/2022  Please describe your symptoms.: Pain in right breast.  Have you had these symptoms before?: No  How long have you been having these symptoms?: 5-7 days  Please list any medications you are currently taking for this condition.: Tylenol  What is the primary reason for your visit?: Other      No other C/o today    The following portions of the patient's history were reviewed and updated as appropriate: allergies, current medications, past family history, past medical history, past social history, past surgical history, and problem list      Review of Systems   Constitutional: Negative for chills, diaphoresis, fatigue and fever.   Eyes: Negative for visual disturbance.   Respiratory: Negative for cough, shortness of breath and wheezing.    Cardiovascular: Negative for chest pain, palpitations and leg swelling.   Neurological: Negative for dizziness and light-headedness.        Objective   Vital Signs:   Vitals:    12/28/22 1125   BP: 152/72   Pulse: 62   Resp: 18   Temp: 98.2 °F (36.8 °C)   TempSrc: Skin   SpO2: 94%   Weight: 122 kg (269 lb)   Height: 165.1 cm  "(65\")        Class 3 Severe Obesity (BMI >=40). Obesity-related health conditions include the following: hypertension and dyslipidemias. Obesity is unchanged. BMI is is above average; BMI management plan is completed. We discussed portion control and increasing exercise.        Physical Exam  Vitals reviewed.   Constitutional:       General: She is not in acute distress.  Eyes:      Conjunctiva/sclera: Conjunctivae normal.   Neck:      Thyroid: No thyromegaly.      Vascular: No carotid bruit.   Cardiovascular:      Rate and Rhythm: Normal rate and regular rhythm.      Heart sounds: Normal heart sounds.   Pulmonary:      Effort: Pulmonary effort is normal. No respiratory distress.      Breath sounds: Normal breath sounds. No stridor. No wheezing, rhonchi or rales.   Chest:      Chest wall: No tenderness.   Breasts:     Right: No swelling, bleeding, inverted nipple, mass, nipple discharge or skin change.      Left: No swelling, bleeding, inverted nipple, mass, nipple discharge, skin change or tenderness.       Lymphadenopathy:      Upper Body:      Right upper body: No supraclavicular, axillary or pectoral adenopathy.   Neurological:      Mental Status: She is alert.   Psychiatric:         Attention and Perception: Attention normal.         Mood and Affect: Mood normal.          Result Review :     The following data was reviewed by: MIRA Szymanski on 12/28/2022:      Mammogram screening bilateral (09/20/2022 13:44)  BI-RADS 1: Negative        Assessment and Plan    Diagnoses and all orders for this visit:    1. Breast pain, right (Primary)  -     US Breast Bilateral Complete; Future  -     Mammo diagnostic digital tomosynthesis bilateral w CAD; Future  -     Comprehensive Metabolic Panel  -     CBC & Differential    2. Paroxysmal atrial fibrillation (HCC)  Comments:  started eliquis last month  keep all follow up s with cardiology    3. Primary hypertension  Comments:  controlled  continue current managment-  " monitor b/p at home bring log to next visit      Continue tylenol  Alternate ice and heat  Gentle exercises  Ordered mammo/us  Follow up with any worsening symptoms or no improvements  Follow Up   Return if symptoms worsen or fail to improve.  Patient was given instructions and counseling regarding her condition or for health maintenance advice. Please see specific information pulled into the AVS if appropriate.

## 2023-01-16 ENCOUNTER — HOSPITAL ENCOUNTER (OUTPATIENT)
Dept: MAMMOGRAPHY | Facility: HOSPITAL | Age: 78
Discharge: HOME | End: 2023-01-16
Payer: MEDICARE

## 2023-01-16 ENCOUNTER — APPOINTMENT (OUTPATIENT)
Dept: OTHER | Facility: HOSPITAL | Age: 78
End: 2023-01-16
Payer: MEDICARE

## 2023-01-16 ENCOUNTER — HOSPITAL ENCOUNTER (OUTPATIENT)
Dept: ULTRASOUND IMAGING | Facility: HOSPITAL | Age: 78
Discharge: HOME | End: 2023-01-16
Payer: MEDICARE

## 2023-01-16 DIAGNOSIS — N64.4 BREAST PAIN, RIGHT: ICD-10-CM

## 2023-01-16 DIAGNOSIS — Z09 FOLLOW-UP EXAM: ICD-10-CM

## 2023-01-16 PROCEDURE — G0279 TOMOSYNTHESIS, MAMMO: HCPCS

## 2023-01-16 PROCEDURE — 76642 ULTRASOUND BREAST LIMITED: CPT

## 2023-01-16 PROCEDURE — 77065 DX MAMMO INCL CAD UNI: CPT

## 2023-01-19 ENCOUNTER — TELEPHONE (OUTPATIENT)
Dept: GASTROENTEROLOGY | Facility: CLINIC | Age: 78
End: 2023-01-19
Payer: MEDICARE

## 2023-01-19 NOTE — TELEPHONE ENCOUNTER
----- Message from Baldemar Cannon MD sent at 12/2/2022 10:54 AM EST -----  Pathology benign  Office visit 12 weeks

## 2023-01-30 ENCOUNTER — APPOINTMENT (OUTPATIENT)
Dept: MAMMOGRAPHY | Facility: HOSPITAL | Age: 78
End: 2023-01-30
Payer: MEDICARE

## 2023-02-18 LAB
ALBUMIN SERPL-MCNC: 4.4 G/DL (ref 3.5–5.2)
ALBUMIN/GLOB SERPL: 2.9 G/DL
ALP SERPL-CCNC: 77 U/L (ref 39–117)
ALT SERPL-CCNC: 16 U/L (ref 1–33)
AST SERPL-CCNC: 16 U/L (ref 1–32)
BASOPHILS # BLD AUTO: 0 10*3/MM3 (ref 0–0.2)
BASOPHILS NFR BLD AUTO: 0 % (ref 0–1.5)
BILIRUB SERPL-MCNC: 0.4 MG/DL (ref 0–1.2)
BUN SERPL-MCNC: 14 MG/DL (ref 8–23)
BUN/CREAT SERPL: 16.5 (ref 7–25)
CALCIUM SERPL-MCNC: 9.4 MG/DL (ref 8.6–10.5)
CHLORIDE SERPL-SCNC: 109 MMOL/L (ref 98–107)
CO2 SERPL-SCNC: 25.4 MMOL/L (ref 22–29)
CREAT SERPL-MCNC: 0.85 MG/DL (ref 0.57–1)
EGFRCR SERPLBLD CKD-EPI 2021: 70.7 ML/MIN/1.73
EOSINOPHIL # BLD AUTO: 0 10*3/MM3 (ref 0–0.4)
EOSINOPHIL NFR BLD AUTO: 0 % (ref 0.3–6.2)
ERYTHROCYTE [DISTWIDTH] IN BLOOD BY AUTOMATED COUNT: 14.3 % (ref 12.3–15.4)
GLOBULIN SER CALC-MCNC: 1.5 GM/DL
GLUCOSE SERPL-MCNC: 114 MG/DL (ref 65–99)
HCT VFR BLD AUTO: 38.8 % (ref 34–46.6)
HGB BLD-MCNC: 12.6 G/DL (ref 12–15.9)
IMM GRANULOCYTES # BLD AUTO: 0.05 10*3/MM3 (ref 0–0.05)
IMM GRANULOCYTES NFR BLD AUTO: 1 % (ref 0–0.5)
LYMPHOCYTES # BLD AUTO: 2 10*3/MM3 (ref 0.7–3.1)
LYMPHOCYTES NFR BLD AUTO: 38.5 % (ref 19.6–45.3)
MCH RBC QN AUTO: 28.3 PG (ref 26.6–33)
MCHC RBC AUTO-ENTMCNC: 32.5 G/DL (ref 31.5–35.7)
MCV RBC AUTO: 87.2 FL (ref 79–97)
MONOCYTES # BLD AUTO: 0.49 10*3/MM3 (ref 0.1–0.9)
MONOCYTES NFR BLD AUTO: 9.4 % (ref 5–12)
NEUTROPHILS # BLD AUTO: 2.66 10*3/MM3 (ref 1.7–7)
NEUTROPHILS NFR BLD AUTO: 51.1 % (ref 42.7–76)
NRBC BLD AUTO-RTO: 0 /100 WBC (ref 0–0.2)
PLATELET # BLD AUTO: 143 10*3/MM3 (ref 140–450)
POTASSIUM SERPL-SCNC: 4.5 MMOL/L (ref 3.5–5.2)
PROT SERPL-MCNC: 5.9 G/DL (ref 6–8.5)
RBC # BLD AUTO: 4.45 10*6/MM3 (ref 3.77–5.28)
SODIUM SERPL-SCNC: 144 MMOL/L (ref 136–145)
WBC # BLD AUTO: 5.2 10*3/MM3 (ref 3.4–10.8)

## 2023-02-20 ENCOUNTER — TELEPHONE (OUTPATIENT)
Dept: FAMILY MEDICINE CLINIC | Facility: CLINIC | Age: 78
End: 2023-02-20

## 2023-02-20 NOTE — TELEPHONE ENCOUNTER
Caller: Jessica Ames    Relationship: Self    Best call back number: [phone]    Caller requesting test results: PATIENT STATED ONLY PART OF HER LAB RESULTS ARE IN MY CHART    What test was performed: LABS    When was the test performed: 02/17    Where was the test performed: IN OFFICE    Additional notes: PATIENT REQUESTING THE RESULTS OF HER A1C, THYROID, CHOLESTEROL.

## 2023-02-21 ENCOUNTER — TELEPHONE (OUTPATIENT)
Dept: FAMILY MEDICINE CLINIC | Facility: CLINIC | Age: 78
End: 2023-02-21
Payer: MEDICARE

## 2023-02-21 NOTE — TELEPHONE ENCOUNTER
Called and spoke with patient.  She stated that she was supposed to be getting A1C, Lipid panel and Thyroid labs, but they were not ordered.  She would like to have them done tomorrow morning, so that she can discuss the results at her upcoming appt.  Can you please add orders?

## 2023-02-22 ENCOUNTER — TELEPHONE (OUTPATIENT)
Dept: FAMILY MEDICINE CLINIC | Facility: CLINIC | Age: 78
End: 2023-02-22

## 2023-02-22 DIAGNOSIS — R94.6 THYROID FUNCTION TEST ABNORMAL: ICD-10-CM

## 2023-02-22 DIAGNOSIS — E78.2 MIXED HYPERLIPIDEMIA: Primary | ICD-10-CM

## 2023-02-22 DIAGNOSIS — E11.9 TYPE 2 DIABETES MELLITUS WITHOUT COMPLICATION, WITHOUT LONG-TERM CURRENT USE OF INSULIN: ICD-10-CM

## 2023-02-22 DIAGNOSIS — I10 PRIMARY HYPERTENSION: ICD-10-CM

## 2023-02-25 RX ORDER — LOSARTAN POTASSIUM 50 MG/1
TABLET ORAL
Qty: 90 TABLET | Refills: 1 | Status: SHIPPED | OUTPATIENT
Start: 2023-02-25

## 2023-03-01 LAB
CHOLEST SERPL-MCNC: 180 MG/DL (ref 0–200)
HBA1C MFR BLD: 6.5 % (ref 4.8–5.6)
HDLC SERPL-MCNC: 41 MG/DL (ref 40–60)
LDLC SERPL CALC-MCNC: 102 MG/DL (ref 0–100)
T4 FREE SERPL-MCNC: 1.09 NG/DL (ref 0.93–1.7)
TRIGL SERPL-MCNC: 213 MG/DL (ref 0–150)
TSH SERPL DL<=0.005 MIU/L-ACNC: 4.51 UIU/ML (ref 0.27–4.2)
VLDLC SERPL CALC-MCNC: 37 MG/DL (ref 5–40)

## 2023-03-02 ENCOUNTER — TELEPHONE (OUTPATIENT)
Dept: FAMILY MEDICINE CLINIC | Facility: CLINIC | Age: 78
End: 2023-03-02
Payer: MEDICARE

## 2023-03-02 ENCOUNTER — OFFICE VISIT (OUTPATIENT)
Dept: FAMILY MEDICINE CLINIC | Facility: CLINIC | Age: 78
End: 2023-03-02
Payer: MEDICARE

## 2023-03-02 VITALS
HEIGHT: 65 IN | WEIGHT: 270 LBS | TEMPERATURE: 97.7 F | OXYGEN SATURATION: 95 % | DIASTOLIC BLOOD PRESSURE: 78 MMHG | HEART RATE: 47 BPM | BODY MASS INDEX: 44.98 KG/M2 | RESPIRATION RATE: 18 BRPM | SYSTOLIC BLOOD PRESSURE: 132 MMHG

## 2023-03-02 DIAGNOSIS — I10 PRIMARY HYPERTENSION: Primary | ICD-10-CM

## 2023-03-02 DIAGNOSIS — F41.9 ANXIETY: ICD-10-CM

## 2023-03-02 DIAGNOSIS — R94.6 THYROID FUNCTION TEST ABNORMAL: ICD-10-CM

## 2023-03-02 DIAGNOSIS — E03.9 HYPOTHYROIDISM, ACQUIRED: ICD-10-CM

## 2023-03-02 DIAGNOSIS — E78.2 MIXED HYPERLIPIDEMIA: ICD-10-CM

## 2023-03-02 DIAGNOSIS — E11.9 TYPE 2 DIABETES MELLITUS WITHOUT COMPLICATION, WITHOUT LONG-TERM CURRENT USE OF INSULIN: ICD-10-CM

## 2023-03-02 DIAGNOSIS — I48.0 PAROXYSMAL ATRIAL FIBRILLATION: ICD-10-CM

## 2023-03-02 DIAGNOSIS — E66.01 CLASS 3 SEVERE OBESITY DUE TO EXCESS CALORIES WITH BODY MASS INDEX (BMI) OF 40.0 TO 44.9 IN ADULT, UNSPECIFIED WHETHER SERIOUS COMORBIDITY PRESENT: ICD-10-CM

## 2023-03-02 DIAGNOSIS — K21.9 GASTROESOPHAGEAL REFLUX DISEASE WITHOUT ESOPHAGITIS: ICD-10-CM

## 2023-03-02 PROCEDURE — 99214 OFFICE O/P EST MOD 30 MIN: CPT | Performed by: NURSE PRACTITIONER

## 2023-03-02 RX ORDER — SEMAGLUTIDE 1.34 MG/ML
0.5 INJECTION, SOLUTION SUBCUTANEOUS WEEKLY
Qty: 6 ML | Refills: 1 | Status: SHIPPED | OUTPATIENT
Start: 2023-03-02 | End: 2023-03-06

## 2023-03-02 RX ORDER — SEMAGLUTIDE 1.34 MG/ML
0.5 INJECTION, SOLUTION SUBCUTANEOUS WEEKLY
Qty: 6 ML | Refills: 1 | Status: SHIPPED | OUTPATIENT
Start: 2023-03-02 | End: 2023-03-02 | Stop reason: SDUPTHER

## 2023-03-02 RX ORDER — LEVOTHYROXINE SODIUM 0.07 MG/1
75 TABLET ORAL DAILY
Qty: 60 TABLET | Refills: 0 | Status: SHIPPED | OUTPATIENT
Start: 2023-03-02 | End: 2023-05-01

## 2023-03-02 NOTE — TELEPHONE ENCOUNTER
Caller: Jessica Ames    Relationship to patient: Self    Best call back number: [phone]     Patient is needing: PATIENT IS CALLING TO STATE THE CO PAY FOR THE OZEMPIC IS EXTREMELY EXPENSIVE.  SHE IS WANTING TO KNOW IF [facility] HAS SAMPLES SHE CAN TRY FOR THE FIRST MONTH.    PLEASE ADVISE.

## 2023-03-02 NOTE — PROGRESS NOTES
Chief Complaint  Hypertension (6 month follow up )    Anh Lopez presents to [facility]   As a 77 year old female for follow up on hypertension, to review labs, refills.  Overall doing well    History of hypertension  Compliant with medications.  Denies any HA, blurred vision, dizziness, flushing, no chest pain or pressure.      History of GERD controlled on PPI-  Works on avoiding triggers    , Hyperlipidemia with goals met with current Rx-  Compliant with medication continuing to work on diet and exercsie  Also noting history of mixed hyperlipidemia intolerant to statins and Zetia is helping    Atrial Fibillation and remains under the care of their cardiologist for management,  Takes Eliquis-  No abnormal bleeding or bruising     type 2 diabetes patient wants to wait on adding metformin and work on diet and exercise.  Declined metformin-  Would like to try ozempic-  Continuing to wok on lower carb and sugar intake  .  Since the last visit, she has overall felt well.  she has been compliant with current meds.  she denies medication side effects.  Glucose control with medication is borderline controlled   The last HgbA1C result was   Lab Results   Component Value Date    HGBA1C 6.50 (H) 02/28/2023   .  The last dilated eye exam was 2022          Borderline thyroid labs with elevated TSH but normal free T4  Feeling very sluggish and would like to try increase medication-  Taking levothyroxine 50 mcg same dose for many years.  Takes in am separate from other medication-  No missing doses  Will repeat labs in 6 weeks    Answers for HPI/ROS submitted by the patient on 2/23/2023  Please describe your symptoms.: 6 month check up and review labs.  Have you had these symptoms before?: Yes  How long have you been having these symptoms?: Greater than 2 weeks  What is the primary reason for your visit?: Other        She also presents today for follow up of Depression and Anxiety.   "She reports medication is working well, patient desires to continue on Rx, and needs refill. Onset of symptoms was approximately several months ago. She denies current suicidal and homicidal ideation. Risk factors are lifestyle of multiple roles.  Previous treatment includes current Rx. She complains of the following medication side effects:none. The patient declines to go to counseling increased stress this past year with increased family responsibilities and moving and care taking-  Doing well on medication  Declined need to change dosage    No other acute C/o today    The following portions of the patient's history were reviewed and updated as appropriate: allergies, current medications, past family history, past medical history, past social history, past surgical history, and problem list        Review of Systems   Constitutional: Positive for fatigue. Negative for chills and fever.   Eyes: Negative for visual disturbance.   Respiratory: Negative for cough, shortness of breath and wheezing.    Cardiovascular: Negative for chest pain, palpitations and leg swelling.   Gastrointestinal: Negative for abdominal pain, diarrhea, nausea and vomiting.   Skin: Negative for rash.   Neurological: Negative for dizziness and light-headedness.   Psychiatric/Behavioral: Negative for self-injury, sleep disturbance and suicidal ideas. The patient is not nervous/anxious.         Objective   Vital Signs:   Vitals:    03/02/23 0919   BP: 132/78   Pulse: (!) 47   Resp: 18   Temp: 97.7 °F (36.5 °C)   TempSrc: Skin   SpO2: 95%   Weight: 122 kg (270 lb)   Height: 165.1 cm (65\")        Class 3 Severe Obesity (BMI >=40). Obesity-related health conditions include the following: hypertension, diabetes mellitus and dyslipidemias. Obesity is unchanged. BMI is is above average; BMI management plan is completed. We discussed portion control and increasing exercise.        Physical Exam  Vitals reviewed.   Constitutional:       General: She is " not in acute distress.  Eyes:      Conjunctiva/sclera: Conjunctivae normal.   Neck:      Thyroid: No thyromegaly.      Vascular: No carotid bruit.   Cardiovascular:      Rate and Rhythm: Normal rate and regular rhythm.      Heart sounds: Normal heart sounds.   Pulmonary:      Effort: Pulmonary effort is normal. No respiratory distress.      Breath sounds: Normal breath sounds. No stridor. No wheezing, rhonchi or rales.   Chest:      Chest wall: No tenderness.   Neurological:      Mental Status: She is alert.   Psychiatric:         Attention and Perception: Attention normal.         Mood and Affect: Mood normal.          Result Review :     The following data was reviewed by: MIRA Szymanski on 03/02/2023:  Hemoglobin A1c (02/28/2023 12:47)  T4, Free (02/28/2023 12:47)  TSH (02/28/2023 12:47)  Lipid Panel (02/28/2023 12:47)  CBC & Differential (02/17/2023 10:54)  Comprehensive Metabolic Panel (02/17/2023 10:54)      glucose 114  GFR normal  Triglyceride 213  Elevated  Total and LDL about the same    Cholesterol still elevated     Thyroid slightly above normal-  normal t4  4.510  Ha1c up to 6.5-  type 2 DM      Assessment and Plan    Diagnoses and all orders for this visit:    1. Primary hypertension (Primary)  Comments:  controlled-  continue current mangement   keep folow up with cardiology  mayra B/p at home bring log to next visit    2. Mixed hyperlipidemia  Comments:  continue current managment  continue to work on lower cholesterol diet and exercise    3. Type 2 diabetes mellitus without complication, without long-term current use of insulin (HCC)  Comments:  HA1c increased to 6.5  would like to try ozempic     Orders:  -     Discontinue: Semaglutide,0.25 or 0.5MG/DOS, (Ozempic, 0.25 or 0.5 MG/DOSE,) 2 MG/1.5ML solution pen-injector; Inject 0.5 mg under the skin into the appropriate area as directed 1 (One) Time Per Week.  Dispense: 6 mL; Refill: 1  -     Semaglutide,0.25 or 0.5MG/DOS, (Ozempic, 0.25 or  0.5 MG/DOSE,) 2 MG/1.5ML solution pen-injector; Inject 0.5 mg under the skin into the appropriate area as directed 1 (One) Time Per Week.  Dispense: 6 mL; Refill: 1    4. Thyroid function test abnormal  Comments:  increase synthroid to 75 mcg-  increased fatigue-  elevated tsh  recheck labs 6 weeks  take in am seperate from other medication  Orders:  -     Comprehensive Metabolic Panel  -     CBC & Differential  -     TSH  -     T4, Free  -     T3    5. Paroxysmal atrial fibrillation (HCC)  Comments:  controlled on eliquis-  keep followup with cardiology as directed    6. Class 3 severe obesity due to excess calories with body mass index (BMI) of 40.0 to 44.9 in adult, unspecified whether serious comorbidity present (HCC)  Comments:  continue to work on diet and exercise  watch carb and sugar intake    7. Gastroesophageal reflux disease without esophagitis  Comments:  avoid triggers-  spicy food, red sauce, caffeine  continue medication    8. Anxiety  Comments:  controlled-  continue current mangment    9. Hypothyroidism, acquired  -     levothyroxine (SYNTHROID, LEVOTHROID) 75 MCG tablet; Take 1 tablet by mouth Daily for 60 days.  Dispense: 60 tablet; Refill: 0  -     Comprehensive Metabolic Panel  -     CBC & Differential  -     TSH  -     T4, Free  -     T3        Follow Up   Return in about 3 months (around 6/2/2023) for Next scheduled follow up.  Patient was given instructions and counseling regarding her condition or for health maintenance advice. Please see specific information pulled into the AVS if appropriate.

## 2023-03-06 DIAGNOSIS — E11.9 TYPE 2 DIABETES MELLITUS WITHOUT COMPLICATION, WITHOUT LONG-TERM CURRENT USE OF INSULIN: Primary | ICD-10-CM

## 2023-03-06 RX ORDER — DULAGLUTIDE 1.5 MG/.5ML
1.5 INJECTION, SOLUTION SUBCUTANEOUS WEEKLY
Qty: 2 ML | Refills: 2 | Status: SHIPPED | OUTPATIENT
Start: 2023-03-06

## 2023-03-08 RX ORDER — LANCETS 28 GAUGE
EACH MISCELLANEOUS
Qty: 100 EACH | Refills: 12 | Status: SHIPPED | OUTPATIENT
Start: 2023-03-08

## 2023-04-13 ENCOUNTER — OFFICE VISIT (OUTPATIENT)
Dept: FAMILY MEDICINE CLINIC | Facility: CLINIC | Age: 78
End: 2023-04-13
Payer: MEDICARE

## 2023-04-13 VITALS
HEART RATE: 64 BPM | OXYGEN SATURATION: 96 % | HEIGHT: 65 IN | SYSTOLIC BLOOD PRESSURE: 129 MMHG | TEMPERATURE: 97.5 F | BODY MASS INDEX: 43.32 KG/M2 | RESPIRATION RATE: 14 BRPM | DIASTOLIC BLOOD PRESSURE: 70 MMHG | WEIGHT: 260 LBS

## 2023-04-13 DIAGNOSIS — J40 BRONCHITIS: Primary | ICD-10-CM

## 2023-04-13 PROCEDURE — 1160F RVW MEDS BY RX/DR IN RCRD: CPT | Performed by: FAMILY MEDICINE

## 2023-04-13 PROCEDURE — 3078F DIAST BP <80 MM HG: CPT | Performed by: FAMILY MEDICINE

## 2023-04-13 PROCEDURE — 99214 OFFICE O/P EST MOD 30 MIN: CPT | Performed by: FAMILY MEDICINE

## 2023-04-13 PROCEDURE — 1159F MED LIST DOCD IN RCRD: CPT | Performed by: FAMILY MEDICINE

## 2023-04-13 PROCEDURE — 3074F SYST BP LT 130 MM HG: CPT | Performed by: FAMILY MEDICINE

## 2023-04-13 RX ORDER — AZITHROMYCIN 250 MG/1
TABLET, FILM COATED ORAL
Qty: 6 TABLET | Refills: 0 | Status: SHIPPED | OUTPATIENT
Start: 2023-04-13

## 2023-04-13 RX ORDER — PREDNISONE 20 MG/1
20 TABLET ORAL DAILY
Qty: 5 TABLET | Refills: 0 | Status: SHIPPED | OUTPATIENT
Start: 2023-04-13

## 2023-04-13 NOTE — PROGRESS NOTES
"Subjective   Jessica Ames is a 77 y.o. female.     CC: URI-Sx    History of Present Illness     Pt of [facility]man' comes in today reporting 4 day h/o cough/congestion/wheezing (at times), along with chills. No fever but having some mild dyspnea and fatigue.         The following portions of the patient's history were reviewed and updated as appropriate: allergies, current medications, past family history, past medical history, past social history, past surgical history and problem list.    Review of Systems   Constitutional: Negative for activity change, chills and fever.   HENT: Positive for congestion.    Respiratory: Positive for cough, shortness of breath and wheezing.    Cardiovascular: Negative for chest pain.   Gastrointestinal: Positive for diarrhea.   Psychiatric/Behavioral: Negative for dysphoric mood.       /70   Pulse 64   Temp 97.5 °F (36.4 °C) (Oral)   Resp 14   Ht 165.1 cm (65\")   Wt 118 kg (260 lb)   LMP  (LMP Unknown)   SpO2 96%   BMI 43.27 kg/m²     Objective   Physical Exam  Constitutional:       General: She is not in acute distress.     Appearance: She is well-developed.   Cardiovascular:      Rate and Rhythm: Normal rate and regular rhythm.   Pulmonary:      Effort: Pulmonary effort is normal.      Breath sounds: Wheezing (faint, dissuse) present.   Neurological:      Mental Status: She is alert and oriented to person, place, and time.   Psychiatric:         Behavior: Behavior normal.         Thought Content: Thought content normal.     Last A1C was excellent, so will use low dose steroid to assist in the wheezing.    Assessment & Plan   Diagnoses and all orders for this visit:    1. Bronchitis (Primary)  Comments:  with systemic symptoms  Orders:  -     azithromycin (Zithromax Z-Carmine) 250 MG tablet; Take 2 tablets the first day, then 1 tablet daily for 4 days.  Dispense: 6 tablet; Refill: 0  -     predniSONE (DELTASONE) 20 MG tablet; Take 1 tablet by mouth Daily.  Dispense: 5 " tablet; Refill: 0

## 2023-04-14 LAB
ALBUMIN SERPL-MCNC: 4.4 G/DL (ref 3.5–5.2)
ALBUMIN/GLOB SERPL: 2 G/DL
ALP SERPL-CCNC: 89 U/L (ref 39–117)
ALT SERPL-CCNC: 17 U/L (ref 1–33)
AST SERPL-CCNC: 14 U/L (ref 1–32)
BASOPHILS # BLD AUTO: 0.01 10*3/MM3 (ref 0–0.2)
BASOPHILS NFR BLD AUTO: 0.2 % (ref 0–1.5)
BILIRUB SERPL-MCNC: 0.6 MG/DL (ref 0–1.2)
BUN SERPL-MCNC: 13 MG/DL (ref 8–23)
BUN/CREAT SERPL: 11.3 (ref 7–25)
CALCIUM SERPL-MCNC: 10 MG/DL (ref 8.6–10.5)
CHLORIDE SERPL-SCNC: 104 MMOL/L (ref 98–107)
CO2 SERPL-SCNC: 27.7 MMOL/L (ref 22–29)
CREAT SERPL-MCNC: 1.15 MG/DL (ref 0.57–1)
EGFRCR SERPLBLD CKD-EPI 2021: 49.2 ML/MIN/1.73
EOSINOPHIL # BLD AUTO: 0 10*3/MM3 (ref 0–0.4)
EOSINOPHIL NFR BLD AUTO: 0 % (ref 0.3–6.2)
ERYTHROCYTE [DISTWIDTH] IN BLOOD BY AUTOMATED COUNT: 14.4 % (ref 12.3–15.4)
GLOBULIN SER CALC-MCNC: 2.2 GM/DL
GLUCOSE SERPL-MCNC: 118 MG/DL (ref 65–99)
HCT VFR BLD AUTO: 43.3 % (ref 34–46.6)
HGB BLD-MCNC: 13.6 G/DL (ref 12–15.9)
IMM GRANULOCYTES # BLD AUTO: 0.03 10*3/MM3 (ref 0–0.05)
IMM GRANULOCYTES NFR BLD AUTO: 0.5 % (ref 0–0.5)
LYMPHOCYTES # BLD AUTO: 1.82 10*3/MM3 (ref 0.7–3.1)
LYMPHOCYTES NFR BLD AUTO: 28 % (ref 19.6–45.3)
MCH RBC QN AUTO: 27.9 PG (ref 26.6–33)
MCHC RBC AUTO-ENTMCNC: 31.4 G/DL (ref 31.5–35.7)
MCV RBC AUTO: 88.7 FL (ref 79–97)
MONOCYTES # BLD AUTO: 0.82 10*3/MM3 (ref 0.1–0.9)
MONOCYTES NFR BLD AUTO: 12.6 % (ref 5–12)
NEUTROPHILS # BLD AUTO: 3.83 10*3/MM3 (ref 1.7–7)
NEUTROPHILS NFR BLD AUTO: 58.7 % (ref 42.7–76)
PLATELET # BLD AUTO: 136 10*3/MM3 (ref 140–450)
POTASSIUM SERPL-SCNC: 4.7 MMOL/L (ref 3.5–5.2)
PROT SERPL-MCNC: 6.6 G/DL (ref 6–8.5)
RBC # BLD AUTO: 4.88 10*6/MM3 (ref 3.77–5.28)
SODIUM SERPL-SCNC: 141 MMOL/L (ref 136–145)
T3 SERPL-MCNC: 84.1 NG/DL (ref 80–200)
T4 FREE SERPL-MCNC: 1.58 NG/DL (ref 0.93–1.7)
TSH SERPL DL<=0.005 MIU/L-ACNC: 3.13 UIU/ML (ref 0.27–4.2)
WBC # BLD AUTO: 6.51 10*3/MM3 (ref 3.4–10.8)

## 2023-04-26 ENCOUNTER — TELEPHONE (OUTPATIENT)
Dept: FAMILY MEDICINE CLINIC | Facility: CLINIC | Age: 78
End: 2023-04-26

## 2023-04-26 NOTE — TELEPHONE ENCOUNTER
Caller: Jessica Ames    Relationship: Self    Best call back number: [phone]    What was the call regarding: PATIENT HAS SCHEDULED AN APPOINTMENT WITH MARLI RESTREPO FOR A THREE MONTH FOLLOW UP ON 05/30/2023. PATIENT WOULD LIKE TO KNOW IF MARLI RESTREPO WANTS HER TO COMPLETE BLOOD WORK BEFORE THE APPOINTMENT. PLEASE ADVISE.     Do you require a callback: YES

## 2023-04-27 DIAGNOSIS — E03.9 HYPOTHYROIDISM, ACQUIRED: ICD-10-CM

## 2023-04-27 RX ORDER — LEVOTHYROXINE SODIUM 0.07 MG/1
TABLET ORAL
Qty: 60 TABLET | Refills: 0 | Status: SHIPPED | OUTPATIENT
Start: 2023-04-27

## 2023-04-27 NOTE — TELEPHONE ENCOUNTER
Rx Refill Note  Requested Prescriptions     Pending Prescriptions Disp Refills   • levothyroxine (SYNTHROID, LEVOTHROID) 75 MCG tablet [Pharmacy Med Name: LEVOTHYROXINE 75 MCG TABLET] 60 tablet 0     Sig: TAKE ONE TABLET BY MOUTH DAILY      Last office visit with prescribing clinician: 3/2/2023   Last telemedicine visit with prescribing clinician: 5/30/2023   Next office visit with prescribing clinician: Visit date not found

## 2023-05-03 RX ORDER — METOPROLOL SUCCINATE 25 MG/1
TABLET, EXTENDED RELEASE ORAL
Qty: 90 TABLET | Refills: 3 | Status: SHIPPED | OUTPATIENT
Start: 2023-05-03

## 2023-05-03 RX ORDER — EZETIMIBE 10 MG/1
10 TABLET ORAL DAILY
Qty: 90 TABLET | Refills: 3 | Status: SHIPPED | OUTPATIENT
Start: 2023-05-03

## 2023-05-17 NOTE — ANESTHESIA PREPROCEDURE EVALUATION
Anesthesia Evaluation     Patient summary reviewed and Nursing notes reviewed   NPO Solid Status: > 8 hours  NPO Liquid Status: > 2 hours           Airway   Mallampati: II  Dental - normal exam     Pulmonary - normal exam   (+) sleep apnea on CPAP,   Cardiovascular - normal exam    ECG reviewed  PT is on anticoagulation therapy  Patient on routine beta blocker    (+) hypertension, dysrhythmias Paroxysmal Atrial Fib, hyperlipidemia,     ROS comment: Reviewed echo and stress test    Neuro/Psych  GI/Hepatic/Renal/Endo    (+) morbid obesity, hiatal hernia, GERD,  diabetes mellitus type 2, thyroid problem     Musculoskeletal     Abdominal   (+) obese,    Substance History      OB/GYN          Other   arthritis,                    Anesthesia Plan    ASA 3     general     (I have reviewed all pertinent information including medical history,allergies, imaging, studies and laboratory results. I have explained risks and benefits to anesthesia, including but not limited to; dental damage, corneal abrasion, sore throat, nausea, vomiting, aspiration, nerve damage, failed block, MI,stroke and death. Patient has agreed to proceed. )    Anesthetic plan, risks, benefits, and alternatives have been provided, discussed and informed consent has been obtained with: patient.        CODE STATUS:       
Normal vision: sees adequately in most situations; can see medication labels, newsprint

## 2023-05-30 ENCOUNTER — OFFICE VISIT (OUTPATIENT)
Dept: FAMILY MEDICINE CLINIC | Facility: CLINIC | Age: 78
End: 2023-05-30

## 2023-05-30 VITALS
SYSTOLIC BLOOD PRESSURE: 135 MMHG | WEIGHT: 254.6 LBS | TEMPERATURE: 98.1 F | OXYGEN SATURATION: 95 % | BODY MASS INDEX: 42.42 KG/M2 | HEIGHT: 65 IN | RESPIRATION RATE: 16 BRPM | DIASTOLIC BLOOD PRESSURE: 55 MMHG | HEART RATE: 59 BPM

## 2023-05-30 DIAGNOSIS — F32.5 DEPRESSION, MAJOR, IN REMISSION: ICD-10-CM

## 2023-05-30 DIAGNOSIS — E03.9 HYPOTHYROIDISM, ACQUIRED: ICD-10-CM

## 2023-05-30 DIAGNOSIS — E53.8 LOW SERUM VITAMIN B12: ICD-10-CM

## 2023-05-30 DIAGNOSIS — I48.0 PAROXYSMAL ATRIAL FIBRILLATION: ICD-10-CM

## 2023-05-30 DIAGNOSIS — Z12.31 ENCOUNTER FOR SCREENING MAMMOGRAM FOR BREAST CANCER: ICD-10-CM

## 2023-05-30 DIAGNOSIS — F41.1 GENERALIZED ANXIETY DISORDER: ICD-10-CM

## 2023-05-30 DIAGNOSIS — E55.9 VITAMIN D DEFICIENCY: ICD-10-CM

## 2023-05-30 DIAGNOSIS — K21.9 GASTROESOPHAGEAL REFLUX DISEASE WITHOUT ESOPHAGITIS: ICD-10-CM

## 2023-05-30 DIAGNOSIS — E78.2 MIXED HYPERLIPIDEMIA: ICD-10-CM

## 2023-05-30 DIAGNOSIS — E11.9 TYPE 2 DIABETES MELLITUS WITHOUT COMPLICATION, WITHOUT LONG-TERM CURRENT USE OF INSULIN: Primary | ICD-10-CM

## 2023-05-30 DIAGNOSIS — G47.33 OSA (OBSTRUCTIVE SLEEP APNEA): ICD-10-CM

## 2023-05-30 DIAGNOSIS — R79.0 LOW IRON STORES: ICD-10-CM

## 2023-05-30 DIAGNOSIS — Z78.0 POST-MENOPAUSAL: ICD-10-CM

## 2023-05-30 DIAGNOSIS — I10 PRIMARY HYPERTENSION: ICD-10-CM

## 2023-05-30 RX ORDER — LEVOTHYROXINE SODIUM 0.07 MG/1
75 TABLET ORAL DAILY
Qty: 90 TABLET | Refills: 3 | Status: SHIPPED | OUTPATIENT
Start: 2023-05-30

## 2023-05-30 RX ORDER — OMEPRAZOLE 40 MG/1
40 CAPSULE, DELAYED RELEASE ORAL 2 TIMES DAILY
Qty: 180 CAPSULE | Refills: 3 | Status: SHIPPED | OUTPATIENT
Start: 2023-05-30

## 2023-05-30 RX ORDER — SEMAGLUTIDE 1.34 MG/ML
INJECTION, SOLUTION SUBCUTANEOUS
COMMUNITY
Start: 2023-04-17 | End: 2023-05-30

## 2023-05-30 NOTE — PROGRESS NOTES
"Subjective   Jessica Ames is a 77 y.o. female.     History of Present Illness    Since the last visit, she has overall felt fairly well.  She has Primary Hypertension and well controlled on current medication, GERD controlled on PPI Rx, Hyperlipidemia on Zetia and is effective with lowering lipid values, Atrial Fibillation and remains under the care of their cardiologist for management, Hypothyroidism well controlled on current medication and will continue Rx, Vitamin D deficiency and will update labs for continued management, Migraine headaches managed by their Specialist and Type 2 diabetes and weight down does need to increase dose of Ozempic.  she has been compliant with current medications have reviewed them.  The patient denies medication side effects.  Will refill medications. /55 (BP Location: Right arm, Patient Position: Sitting)   Pulse 59   Temp 98.1 °F (36.7 °C) (Oral)   Resp 16   Ht 165.1 cm (65\")   Wt 115 kg (254 lb 9.6 oz)   LMP  (LMP Unknown)   SpO2 95%   BMI 42.37 kg/m²     Results for orders placed or performed in visit on 03/02/23   Comprehensive Metabolic Panel    Specimen: Blood   Result Value Ref Range    Glucose 118 (H) 65 - 99 mg/dL    BUN 13 8 - 23 mg/dL    Creatinine 1.15 (H) 0.57 - 1.00 mg/dL    EGFR Result 49.2 (L) >60.0 mL/min/1.73    BUN/Creatinine Ratio 11.3 7.0 - 25.0    Sodium 141 136 - 145 mmol/L    Potassium 4.7 3.5 - 5.2 mmol/L    Chloride 104 98 - 107 mmol/L    Total CO2 27.7 22.0 - 29.0 mmol/L    Calcium 10.0 8.6 - 10.5 mg/dL    Total Protein 6.6 6.0 - 8.5 g/dL    Albumin 4.4 3.5 - 5.2 g/dL    Globulin 2.2 gm/dL    A/G Ratio 2.0 g/dL    Total Bilirubin 0.6 0.0 - 1.2 mg/dL    Alkaline Phosphatase 89 39 - 117 U/L    AST (SGOT) 14 1 - 32 U/L    ALT (SGPT) 17 1 - 33 U/L   TSH    Specimen: Blood   Result Value Ref Range    TSH 3.130 0.270 - 4.200 uIU/mL   T4, Free    Specimen: Blood   Result Value Ref Range    Free T4 1.58 0.93 - 1.70 ng/dL   T3    Specimen: Blood "   Result Value Ref Range    T3, Total 84.1 80.0 - 200.0 ng/dl   CBC & Differential    Specimen: Blood   Result Value Ref Range    WBC 6.51 3.40 - 10.80 10*3/mm3    RBC 4.88 3.77 - 5.28 10*6/mm3    Hemoglobin 13.6 12.0 - 15.9 g/dL    Hematocrit 43.3 34.0 - 46.6 %    MCV 88.7 79.0 - 97.0 fL    MCH 27.9 26.6 - 33.0 pg    MCHC 31.4 (L) 31.5 - 35.7 g/dL    RDW 14.4 12.3 - 15.4 %    Platelets 136 (L) 140 - 450 10*3/mm3    Neutrophil Rel % 58.7 42.7 - 76.0 %    Lymphocyte Rel % 28.0 19.6 - 45.3 %    Monocyte Rel % 12.6 (H) 5.0 - 12.0 %    Eosinophil Rel % 0.0 (L) 0.3 - 6.2 %    Basophil Rel % 0.2 0.0 - 1.5 %    Neutrophils Absolute 3.83 1.70 - 7.00 10*3/mm3    Lymphocytes Absolute 1.82 0.70 - 3.10 10*3/mm3    Monocytes Absolute 0.82 0.10 - 0.90 10*3/mm3    Eosinophils Absolute 0.00 0.00 - 0.40 10*3/mm3    Basophils Absolute 0.01 0.00 - 0.20 10*3/mm3    Immature Granulocyte Rel % 0.5 0.0 - 0.5 %    Immature Grans Absolute 0.03 0.00 - 0.05 10*3/mm3   walking for exercise  Sees ortho Dr Davis  Did see DR Morgan for acute bronchitis 4-13-23--did resolve    Down 19 lbs---doing fine on Ozempic 0.5mg  Sees eye doc---watching eye pressures    On Cpap--DR Murdock  Was increased on thyroid Feb--per labs and at goal 4-13-23    Declined Prolia  took Actonel 10-18-19---now off  Factor V and hx PE---Dr Bowman hematologist  Anemia from malabsorption and has been seeing Dr. Bowman  Last EGD 11-1-18 DR Osuna  Cannot take statins; intol  Also follow-up for anxiety and depression noting started Zoloft 10/12/2021  Saw DR Lo 12-2-22  Plan:       Well I think she needs to be anticoagulated.  She is having paroxysmal A. fib she has a CHADS2 Vascor of 3+.  She does not have any history of bleeding she has factor V Leiden she is morbidly obese she has had a DVT she has had a pulmonary embolus.  She has not had any bleeding problems.  There is really not a role for aspirin here I Lissette stop the aspirin put her on Eliquis we talked about  "that at length in addition she is got a get smaller words she is really not can a change in how she is going to feel and I think her risk for A. fib is going to continue to be high and does not respond that well with ablation if she has morbid obesity.  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  So I would love to see her lose some weight love to see her get into the 230s will see I gave her the full-court press on that that and I will have her see Kelly in 6 months and see me in a year     Return in about 1 year (around 12/2/2023) for See Kelly Steel in 6 months.      NOTE---she is back on Eliquis!!!! For about 6 mos  Jessica Ames female 77 y.o., /55 (BP Location: Right arm, Patient Position: Sitting)   Pulse 59   Temp 98.1 °F (36.7 °C) (Oral)   Resp 16   Ht 165.1 cm (65\")   Wt 115 kg (254 lb 9.6 oz)   LMP  (LMP Unknown)   SpO2 95%   BMI 42.37 kg/m²   who presents today for follow up of Depression and Anxiety.  She reports medication is working well, patient desires to continue on Rx, and needs refill. Onset of symptoms was approximately several years ago. She denies current suicidal and homicidal ideation. Risk factors are family history of anxiety and or depression and lifestyle of multiple roles.  Previous treatment includes current Rx. She complains of the following medication side effects:none. The patient declines to go to counseling..          The following portions of the patient's history were reviewed and updated as appropriate: allergies, current medications, past family history, past medical history, past social history, past surgical history and problem list.    Review of Systems   Constitutional: Negative for activity change, appetite change and unexpected weight change.   HENT: Negative for nosebleeds and trouble " swallowing.    Eyes: Negative for pain and visual disturbance.   Respiratory: Negative for chest tightness, shortness of breath and wheezing.    Cardiovascular: Negative for chest pain and palpitations.   Gastrointestinal: Negative for abdominal pain and blood in stool.   Endocrine: Negative.    Genitourinary: Negative for difficulty urinating and hematuria.   Musculoskeletal: Positive for arthralgias and joint swelling.   Skin: Negative for color change and rash.   Allergic/Immunologic: Negative.    Neurological: Negative for syncope and speech difficulty.   Hematological: Negative for adenopathy.   Psychiatric/Behavioral: Negative for agitation and confusion.   All other systems reviewed and are negative.      Objective   Physical Exam  Vitals and nursing note reviewed.   Constitutional:       General: She is not in acute distress.     Appearance: She is well-developed. She is obese. She is not ill-appearing or toxic-appearing.   HENT:      Head: Normocephalic.      Right Ear: External ear normal.      Left Ear: External ear normal.      Nose: Nose normal.      Mouth/Throat:      Pharynx: Oropharynx is clear.   Eyes:      General: No scleral icterus.     Conjunctiva/sclera: Conjunctivae normal.      Pupils: Pupils are equal, round, and reactive to light.   Neck:      Thyroid: No thyromegaly.      Vascular: No carotid bruit.   Cardiovascular:      Rate and Rhythm: Normal rate and regular rhythm.      Pulses: Normal pulses.      Heart sounds: Normal heart sounds. No murmur heard.  Pulmonary:      Effort: Pulmonary effort is normal. No respiratory distress.      Breath sounds: Normal breath sounds. No rales.   Musculoskeletal:         General: No deformity. Normal range of motion.      Cervical back: Normal range of motion and neck supple.      Right lower leg: Edema present.      Left lower leg: Edema present.   Skin:     General: Skin is warm and dry.      Findings: No rash.   Neurological:      General: No focal  deficit present.      Mental Status: She is alert and oriented to person, place, and time. Mental status is at baseline.   Psychiatric:         Mood and Affect: Mood normal.         Behavior: Behavior normal.         Thought Content: Thought content normal.         Judgment: Judgment normal.         Assessment & Plan   Diagnoses and all orders for this visit:    1. Type 2 diabetes mellitus without complication, without long-term current use of insulin (Primary)    2. ERIC (obstructive sleep apnea)    3. Mixed hyperlipidemia    4. Primary hypertension    5. Paroxysmal atrial fibrillation    6. Gastroesophageal reflux disease without esophagitis    7. Vitamin D deficiency    8. Generalized anxiety disorder    9. Depression, major, in remission    10. Encounter for screening mammogram for breast cancer  -     Mammo Screening Digital Tomosynthesis Bilateral With CAD; Future    11. Post-menopausal  -     DEXA Bone Density Axial    Other orders  -     Semaglutide, 1 MG/DOSE, (OZEMPIC) 2 MG/1.5ML solution pen-injector; Inject 1 mg under the skin into the appropriate area as directed 1 (One) Time Per Week. For DMII  Dispense: 9 mL; Refill: 1        I am to do yearly labs for iron and ferritin---per notes Dr Bowman hematologist  Use Cpap/Bipap every night  Plan, Jessica Ames, was seen today.  she was seen for HTN and continue medication, DMII and refilled medications, GERD and will continue on PPI medication, Hyperlipidemia and will continue current medication, Atrial Fibrillation and remains on medication for treatment and is stable, Atrial Fibrillation and remains under the care of their cardiologist for medical management and is stable, Hypothyroidism well controlled, continue medication and Vitamin D deficiency and will update labs .  Continue sertraline for anxiety depression working well  Has follow-up with cardiology tomorrow for the A-fib and remains on Eliquis anticoagulation  Increase dose Ozempic to 1 mg   Wally does want her weight to continue to come down  Continue B12 twice weekly and update labs in August also update iron labs       Answers for HPI/ROS submitted by the patient on 5/23/2023  Please describe your symptoms.: Follow up on meds and bronchitis diagnosis.  Have you had these symptoms before?: Yes  How long have you been having these symptoms?: Greater than 2 weeks  Please list any medications you are currently taking for this condition.: None for bronchitis  What is the primary reason for your visit?: Other

## 2023-05-30 NOTE — PROGRESS NOTES
The ABCs of the Annual Wellness Visit  Subsequent Medicare Wellness Visit    Subjective    eJssica Ames is a 77 y.o. female who presents for a Subsequent Medicare Wellness Visit.    The following portions of the patient's history were reviewed and   updated as appropriate: allergies, current medications, past family history, past medical history, past social history, past surgical history and problem list.    Compared to one year ago, the patient feels her physical   health is better.    Compared to one year ago, the patient feels her mental   health is better.    Recent Hospitalizations:  She was not admitted to the hospital during the last year.       Current Medical Providers:  Patient Care Team:  Hortencia Jackman PA-C as PCP - General  Hortencia Jackman PA-C as PCP - Family Medicine  Clem Bowman MD as Consulting Physician (Hematology and Oncology)  Anirudh Adame MD as Consulting Physician (Cardiology)  Luke Hwang MD as Consulting Physician (Otolaryngology)  Yoseph Mars MD as Consulting Physician (Pulmonary Disease)  Fawad Rowland MD as Consulting Physician (Ophthalmology)  Jr Matthews MD as Consulting Physician (Hand Surgery)  Donavon Osuna MD as Consulting Physician (Gastroenterology)  Jazmin Murdock MD as Consulting Physician (Pulmonary Disease)  Parminder Mejia Jr., MD as Consulting Physician (Vascular Surgery)    Outpatient Medications Prior to Visit   Medication Sig Dispense Refill   • Semaglutide,0.25 or 0.5MG/DOS, (Ozempic, 0.25 or 0.5 MG/DOSE,) 2 MG/1.5ML solution pen-injector      • Acetaminophen (TYLENOL PO) Take  by mouth Daily As Needed.     • apixaban (ELIQUIS) 5 MG tablet tablet Take 1 tablet by mouth Every 12 (Twelve) Hours. 180 tablet 3   • azithromycin (Zithromax Z-Carmine) 250 MG tablet Take 2 tablets the first day, then 1 tablet daily for 4 days. 6 tablet 0   • Blood Glucose Monitoring Suppl device 1 each Take As Directed. 1 each 0   • Cholecalciferol 2000 units  capsule Take 2,000 Units by mouth Daily. One PO daily 90 each 3   • Cyanocobalamin (VITAMIN B-12) 1000 MCG sublingual tablet Place 1,000 mcg under the tongue Daily. One SL daily (Patient taking differently: Place 1 tablet under the tongue Daily. TWICE A WEEK) 90 each 3   • ezetimibe (ZETIA) 10 MG tablet TAKE 1 TABLET BY MOUTH DAILY. FOR CHOLESTEROL 90 tablet 3   • glucose blood test strip Use as instructed 200 each 12   • Lancets (freestyle) lancets Check BS fasting and HS daily 100 each 12   • losartan (COZAAR) 50 MG tablet TAKE 1 TABLET EVERY DAY FOR BLOOD PRESSURE 90 tablet 1   • metoprolol succinate XL (TOPROL-XL) 25 MG 24 hr tablet TAKE 1 TABLET ONE TIME DAILY FOR HEART 90 tablet 3   • predniSONE (DELTASONE) 20 MG tablet Take 1 tablet by mouth Daily. 5 tablet 0   • sertraline (ZOLOFT) 50 MG tablet TAKE 1 TABLET EVERY DAY FOR STRESS 90 tablet 3   • Dulaglutide (Trulicity) 1.5 MG/0.5ML solution pen-injector Inject 1.5 mg under the skin into the appropriate area as directed 1 (One) Time Per Week. Inject 1.5mg SC once weekly for DMII 2 mL 2   • levothyroxine (SYNTHROID, LEVOTHROID) 75 MCG tablet TAKE ONE TABLET BY MOUTH DAILY 60 tablet 0   • omeprazole (priLOSEC) 40 MG capsule Take 1 capsule by mouth 2 (Two) Times a Day. 180 capsule 3     No facility-administered medications prior to visit.       No opioid medication identified on active medication list. I have reviewed chart for other potential  high risk medication/s and harmful drug interactions in the elderly.          Aspirin is not on active medication list.  Aspirin use is not indicated based on review of current medical condition/s. Risk of harm outweighs potential benefits.  .    Patient Active Problem List   Diagnosis   • Hypertension   • Hyperlipidemia   • ERIC (obstructive sleep apnea)   • Impaired fasting glucose   • First degree AV block   • Left anterior fascicular block   • Obesity   • Paroxysmal atrial fibrillation   • PVC's (premature ventricular  "contractions)   • GERD (gastroesophageal reflux disease)   • Macular degeneration   • Glaucoma   • Venous insufficiency   • History of pulmonary embolism   • Chronic anticoagulation   • DVT (deep venous thrombosis)   • Factor 5 Leiden mutation, heterozygous   • PE (pulmonary thromboembolism)   • A-fib   • Osteoporosis without current pathological fracture   • Calculus of gallbladder with chronic cholecystitis without obstruction     Advance Care Planning   Advance Care Planning     Advance Directive is not on file.  ACP discussion was held with the patient during this visit. Patient has an advance directive (not in EMR), copy requested.     Objective    Vitals:    05/30/23 0951   BP: 135/55   BP Location: Right arm   Patient Position: Sitting   Pulse: 59   Resp: 16   Temp: 98.1 °F (36.7 °C)   TempSrc: Oral   SpO2: 95%   Weight: 115 kg (254 lb 9.6 oz)   Height: 165.1 cm (65\")     Estimated body mass index is 42.37 kg/m² as calculated from the following:    Height as of this encounter: 165.1 cm (65\").    Weight as of this encounter: 115 kg (254 lb 9.6 oz).    Class 3 Severe Obesity (BMI >=40). Obesity-related health conditions include the following: obstructive sleep apnea, hypertension, diabetes mellitus, dyslipidemias, GERD and osteoarthritis. Obesity is improving with treatment. BMI is is above average; BMI management plan is completed. We discussed low calorie, low carb based diet program, portion control, increasing exercise and raise Ozempic dose.      Does the patient have evidence of cognitive impairment?   No            HEALTH RISK ASSESSMENT    Smoking Status:  Social History     Tobacco Use   Smoking Status Never   Smokeless Tobacco Never   Tobacco Comments    minimal caffeine     Alcohol Consumption:  Social History     Substance and Sexual Activity   Alcohol Use Not Currently    Comment: \" once month\"     Fall Risk Screen:    STEADI Fall Risk Assessment was completed, and patient is at LOW risk for " falls.Assessment completed on:3/2/2023    Depression Screenin/30/2023     9:58 AM   PHQ-2/PHQ-9 Depression Screening   Little Interest or Pleasure in Doing Things 0-->not at all   Feeling Down, Depressed or Hopeless 0-->not at all   PHQ-9: Brief Depression Severity Measure Score 0       Health Habits and Functional and Cognitive Screenin/30/2023     9:00 AM   Functional & Cognitive Status   Do you have difficulty preparing food and eating? No   Do you have difficulty bathing yourself, getting dressed or grooming yourself? No   Do you have difficulty using the toilet? No   Do you have difficulty moving around from place to place? No   Do you have trouble with steps or getting out of a bed or a chair? No   Current Diet Diabetic Diet   Dental Exam Up to date   Eye Exam Up to date   Exercise (times per week) 0 times per week   Current Exercises Include No Regular Exercise   Do you need help using the phone?  No   Are you deaf or do you have serious difficulty hearing?  Yes   Do you need help with transportation? No   Do you need help shopping? No   Do you need help preparing meals?  No   Do you need help with housework?  No   Do you need help with laundry? No   Do you need help taking your medications? No   Do you need help managing money? No   Do you ever drive or ride in a car without wearing a seat belt? No   Have you felt unusual stress, anger or loneliness in the last month? No   Who do you live with? Spouse   If you need help, do you have trouble finding someone available to you? No   Have you been bothered in the last four weeks by sexual problems? No   Do you have difficulty concentrating, remembering or making decisions? No       Age-appropriate Screening Schedule:  Refer to the list below for future screening recommendations based on patient's age, sex and/or medical conditions. Orders for these recommended tests are listed in the plan section. The patient has been provided with a written  plan.    Health Maintenance   Topic Date Due   • ZOSTER VACCINE (2 of 2) 10/12/2017   • DXA SCAN  11/10/2022   • INFLUENZA VACCINE  08/01/2023   • HEMOGLOBIN A1C  08/28/2023   • URINE MICROALBUMIN  11/21/2023   • LIPID PANEL  02/28/2024   • DIABETIC EYE EXAM  03/22/2024   • ANNUAL WELLNESS VISIT  05/30/2024   • MAMMOGRAM  01/16/2025   • COLORECTAL CANCER SCREENING  11/01/2026   • TDAP/TD VACCINES (3 - Td or Tdap) 08/03/2028   • COVID-19 Vaccine  Completed   • Pneumococcal Vaccine 65+  Completed   • HEPATITIS C SCREENING  Addressed                  CMS Preventative Services Quick Reference  Risk Factors Identified During Encounter:    CAD risk --    The above risks/problems have been discussed with the patient.  Pertinent information has been shared with the patient in the After Visit Summary.    Diagnoses and all orders for this visit:    1. Type 2 diabetes mellitus without complication, without long-term current use of insulin (Primary)  -     Comprehensive metabolic panel; Future  -     Lipid panel; Future  -     CBC and Differential; Future  -     TSH; Future  -     Hemoglobin A1c; Future  -     Vitamin D,25-Hydroxy; Future  -     Vitamin B12; Future  -     Folate; Future  -     T4, Free; Future  -     T3, Free; Future  -     Urinalysis With Microscopic - Urine, Clean Catch; Future  -     Microalbumin / Creatinine Urine Ratio - Urine, Clean Catch; Future  -     Magnesium; Future    2. ERIC (obstructive sleep apnea)  -     Comprehensive metabolic panel; Future  -     Lipid panel; Future  -     CBC and Differential; Future  -     TSH; Future  -     Hemoglobin A1c; Future  -     Vitamin D,25-Hydroxy; Future  -     Vitamin B12; Future  -     Folate; Future  -     T4, Free; Future  -     T3, Free; Future  -     Urinalysis With Microscopic - Urine, Clean Catch; Future  -     Microalbumin / Creatinine Urine Ratio - Urine, Clean Catch; Future  -     Magnesium; Future    3. Mixed hyperlipidemia  -     Comprehensive  metabolic panel; Future  -     Lipid panel; Future  -     CBC and Differential; Future  -     TSH; Future  -     Hemoglobin A1c; Future  -     Vitamin D,25-Hydroxy; Future  -     Vitamin B12; Future  -     Folate; Future  -     T4, Free; Future  -     T3, Free; Future  -     Urinalysis With Microscopic - Urine, Clean Catch; Future  -     Microalbumin / Creatinine Urine Ratio - Urine, Clean Catch; Future  -     Magnesium; Future    4. Primary hypertension  -     Comprehensive metabolic panel; Future  -     Lipid panel; Future  -     CBC and Differential; Future  -     TSH; Future  -     Hemoglobin A1c; Future  -     Vitamin D,25-Hydroxy; Future  -     Vitamin B12; Future  -     Folate; Future  -     T4, Free; Future  -     T3, Free; Future  -     Urinalysis With Microscopic - Urine, Clean Catch; Future  -     Microalbumin / Creatinine Urine Ratio - Urine, Clean Catch; Future  -     Magnesium; Future    5. Paroxysmal atrial fibrillation  -     Comprehensive metabolic panel; Future  -     Lipid panel; Future  -     CBC and Differential; Future  -     TSH; Future  -     Hemoglobin A1c; Future  -     Vitamin D,25-Hydroxy; Future  -     Vitamin B12; Future  -     Folate; Future  -     T4, Free; Future  -     T3, Free; Future  -     Urinalysis With Microscopic - Urine, Clean Catch; Future  -     Microalbumin / Creatinine Urine Ratio - Urine, Clean Catch; Future  -     Magnesium; Future    6. Gastroesophageal reflux disease without esophagitis  -     Comprehensive metabolic panel; Future  -     Lipid panel; Future  -     CBC and Differential; Future  -     TSH; Future  -     Hemoglobin A1c; Future  -     Vitamin D,25-Hydroxy; Future  -     Vitamin B12; Future  -     Folate; Future  -     T4, Free; Future  -     T3, Free; Future  -     Urinalysis With Microscopic - Urine, Clean Catch; Future  -     Microalbumin / Creatinine Urine Ratio - Urine, Clean Catch; Future  -     Magnesium; Future    7. Vitamin D deficiency  -      Comprehensive metabolic panel; Future  -     Lipid panel; Future  -     CBC and Differential; Future  -     TSH; Future  -     Hemoglobin A1c; Future  -     Vitamin D,25-Hydroxy; Future  -     Vitamin B12; Future  -     Folate; Future  -     T4, Free; Future  -     T3, Free; Future  -     Urinalysis With Microscopic - Urine, Clean Catch; Future  -     Microalbumin / Creatinine Urine Ratio - Urine, Clean Catch; Future  -     Magnesium; Future    8. Generalized anxiety disorder  -     Comprehensive metabolic panel; Future  -     Lipid panel; Future  -     CBC and Differential; Future  -     TSH; Future  -     Hemoglobin A1c; Future  -     Vitamin D,25-Hydroxy; Future  -     Vitamin B12; Future  -     Folate; Future  -     T4, Free; Future  -     T3, Free; Future  -     Urinalysis With Microscopic - Urine, Clean Catch; Future  -     Microalbumin / Creatinine Urine Ratio - Urine, Clean Catch; Future  -     Magnesium; Future    9. Depression, major, in remission  -     Comprehensive metabolic panel; Future  -     Lipid panel; Future  -     CBC and Differential; Future  -     TSH; Future  -     Hemoglobin A1c; Future  -     Vitamin D,25-Hydroxy; Future  -     Vitamin B12; Future  -     Folate; Future  -     T4, Free; Future  -     T3, Free; Future  -     Urinalysis With Microscopic - Urine, Clean Catch; Future  -     Microalbumin / Creatinine Urine Ratio - Urine, Clean Catch; Future  -     Magnesium; Future    10. Encounter for screening mammogram for breast cancer  -     Mammo Screening Digital Tomosynthesis Bilateral With CAD; Future  -     Comprehensive metabolic panel; Future  -     Lipid panel; Future  -     CBC and Differential; Future  -     TSH; Future  -     Hemoglobin A1c; Future  -     Vitamin D,25-Hydroxy; Future  -     Vitamin B12; Future  -     Folate; Future  -     T4, Free; Future  -     T3, Free; Future  -     Urinalysis With Microscopic - Urine, Clean Catch; Future  -     Microalbumin / Creatinine Urine  Ratio - Urine, Clean Catch; Future  -     Magnesium; Future    11. Post-menopausal  -     DEXA Bone Density Axial  -     Comprehensive metabolic panel; Future  -     Lipid panel; Future  -     CBC and Differential; Future  -     TSH; Future  -     Hemoglobin A1c; Future  -     Vitamin D,25-Hydroxy; Future  -     Vitamin B12; Future  -     Folate; Future  -     T4, Free; Future  -     T3, Free; Future  -     Urinalysis With Microscopic - Urine, Clean Catch; Future  -     Microalbumin / Creatinine Urine Ratio - Urine, Clean Catch; Future  -     Magnesium; Future    12. Hypothyroidism, acquired  -     levothyroxine (SYNTHROID, LEVOTHROID) 75 MCG tablet; Take 1 tablet by mouth Daily. before breakfast for thyroid  Dispense: 90 tablet; Refill: 3  -     Comprehensive metabolic panel; Future  -     Lipid panel; Future  -     CBC and Differential; Future  -     TSH; Future  -     Hemoglobin A1c; Future  -     Vitamin D,25-Hydroxy; Future  -     Vitamin B12; Future  -     Folate; Future  -     T4, Free; Future  -     T3, Free; Future  -     Urinalysis With Microscopic - Urine, Clean Catch; Future  -     Microalbumin / Creatinine Urine Ratio - Urine, Clean Catch; Future  -     Magnesium; Future    13. Low iron stores  Comments:  Not currently on iron we will update lab  Orders:  -     Comprehensive metabolic panel; Future  -     Lipid panel; Future  -     CBC and Differential; Future  -     TSH; Future  -     Hemoglobin A1c; Future  -     Vitamin D,25-Hydroxy; Future  -     Vitamin B12; Future  -     Folate; Future  -     T4, Free; Future  -     T3, Free; Future  -     Urinalysis With Microscopic - Urine, Clean Catch; Future  -     Microalbumin / Creatinine Urine Ratio - Urine, Clean Catch; Future  -     Magnesium; Future    14. Low serum vitamin B12  Comments:  Taking twice a week  Orders:  -     Comprehensive metabolic panel; Future  -     Lipid panel; Future  -     CBC and Differential; Future  -     TSH; Future  -      Hemoglobin A1c; Future  -     Vitamin D,25-Hydroxy; Future  -     Vitamin B12; Future  -     Folate; Future  -     T4, Free; Future  -     T3, Free; Future  -     Urinalysis With Microscopic - Urine, Clean Catch; Future  -     Microalbumin / Creatinine Urine Ratio - Urine, Clean Catch; Future  -     Magnesium; Future    Other orders  -     Discontinue: Semaglutide, 1 MG/DOSE, (OZEMPIC) 2 MG/1.5ML solution pen-injector; Inject 1 mg under the skin into the appropriate area as directed 1 (One) Time Per Week. For DMII  Dispense: 9 mL; Refill: 1  -     Discontinue: Semaglutide, 1 MG/DOSE, (OZEMPIC) 2 MG/1.5ML solution pen-injector; Inject 1 mg under the skin into the appropriate area as directed 1 (One) Time Per Week. For DMII  Dispense: 9 mL; Refill: 1  -     Semaglutide, 1 MG/DOSE, (OZEMPIC) 2 MG/1.5ML solution pen-injector; Inject 1 mg under the skin into the appropriate area as directed 1 (One) Time Per Week. For DMII  Dispense: 9 mL; Refill: 1  -     omeprazole (priLOSEC) 40 MG capsule; Take 1 capsule by mouth 2 (Two) Times a Day.  Dispense: 180 capsule; Refill: 3        Follow Up:   Next Medicare Wellness visit to be scheduled in 1 year.      An After Visit Summary and PPPS were made available to the patient.          Answers for HPI/ROS submitted by the patient on 5/23/2023  Please describe your symptoms.: Follow up on meds and bronchitis diagnosis.  Have you had these symptoms before?: Yes  How long have you been having these symptoms?: Greater than 2 weeks  Please list any medications you are currently taking for this condition.: None for bronchitis  What is the primary reason for your visit?: Other

## 2023-05-30 NOTE — PATIENT INSTRUCTIONS
Medicare Wellness  Personal Prevention Plan of Service     Date of Office Visit:    Encounter Provider:  Hortencia Jackman PA-C  Place of Service:  [facility]  Patient Name: Jessica Ames  :  1945    As part of the Medicare Wellness portion of your visit today, we are providing you with this personalized preventive plan of services (PPPS). This plan is based upon recommendations of the [place] Preventive Services Task Force (USPSTF) and the Advisory Committee on Immunization Practices (ACIP).    This lists the preventive care services that should be considered, and provides dates of when you are due. Items listed as completed are up-to-date and do not require any further intervention.    Health Maintenance   Topic Date Due    ZOSTER VACCINE (2 of 2) 10/12/2017    DXA SCAN  11/10/2022    INFLUENZA VACCINE  2023    HEMOGLOBIN A1C  2023    URINE MICROALBUMIN  2023    LIPID PANEL  2024    DIABETIC EYE EXAM  2024    ANNUAL WELLNESS VISIT  2024    MAMMOGRAM  2025    COLORECTAL CANCER SCREENING  2026    TDAP/TD VACCINES (3 - Td or Tdap) 2028    COVID-19 Vaccine  Completed    Pneumococcal Vaccine 65+  Completed    HEPATITIS C SCREENING  Addressed       Orders Placed This Encounter   Procedures    Mammo Screening Digital Tomosynthesis Bilateral With CAD     Standing Status:   Future     Standing Expiration Date:   2024     Order Specific Question:   Reason for Exam:     Answer:   screen--[facility] of l [facility]    DEXA Bone Density Axial     Order Specific Question:   Reason for Exam:     Answer:   PM    Comprehensive metabolic panel     Standing Status:   Future     Standing Expiration Date:   2024     Order Specific Question:   Release to patient     Answer:   Routine Release    Lipid panel     Standing Status:   Future     Standing Expiration Date:   2024    TSH     Standing Status:   Future     Standing Expiration  Date:   5/30/2024     Order Specific Question:   Release to patient     Answer:   Routine Release    Hemoglobin A1c     Standing Status:   Future     Standing Expiration Date:   5/30/2024     Order Specific Question:   Release to patient     Answer:   Routine Release    Vitamin D,25-Hydroxy     Standing Status:   Future     Standing Expiration Date:   5/30/2024     Order Specific Question:   Release to patient     Answer:   Routine Release    Vitamin B12     Standing Status:   Future     Standing Expiration Date:   5/30/2024     Order Specific Question:   Release to patient     Answer:   Routine Release    Folate     Standing Status:   Future     Standing Expiration Date:   5/30/2024     Order Specific Question:   Release to patient     Answer:   Routine Release    T4, Free     Standing Status:   Future     Standing Expiration Date:   5/30/2024     Order Specific Question:   Release to patient     Answer:   Routine Release    T3, Free     Standing Status:   Future     Standing Expiration Date:   5/30/2024     Order Specific Question:   Release to patient     Answer:   Routine Release    Microalbumin / Creatinine Urine Ratio - Urine, Clean Catch     Standing Status:   Future     Standing Expiration Date:   5/30/2024     Order Specific Question:   Release to patient     Answer:   Routine Release    Magnesium     Standing Status:   Future     Standing Expiration Date:   5/30/2024     Order Specific Question:   Release to patient     Answer:   Routine Release    CBC and Differential     Standing Status:   Future     Standing Expiration Date:   5/30/2024     Order Specific Question:   Manual Differential     Answer:   No    Urinalysis With Microscopic - Urine, Clean Catch     Standing Status:   Future     Standing Expiration Date:   5/30/2024     Order Specific Question:   Release to patient     Answer:   Routine Release       Return in about 6 months (around 11/30/2023).        Diabetes Mellitus Basics  Diabetes  mellitus, or diabetes, is a long-term (chronic) disease. It occurs when the body does not properly use sugar (glucose) that is released from food after you eat.  Diabetes mellitus may be caused by one or both of these problems:  Your pancreas does not make enough of a hormone called insulin.  Your body does not react in a normal way to the insulin that it makes.  Insulin lets glucose enter cells in your body. This gives you energy. If you have diabetes, glucose cannot get into cells. This causes high blood glucose (hyperglycemia).  How to treat and manage diabetes  You may need to take insulin or other diabetes medicines daily to keep your glucose in balance. If you are prescribed insulin, you will learn how to give yourself insulin by injection. You may need to adjust the amount of insulin you take based on the foods that you eat.  You will need to check your blood glucose levels using a glucose monitor as told by your health care provider. The readings can help determine if you have low or high blood glucose.  Generally, you should have these blood glucose levels:  Before meals (preprandial):  mg/dL (4.4-7.2 mmol/L).  After meals (postprandial): below 180 mg/dL (10 mmol/L).  Hemoglobin A1c (HbA1c) level: less than 7%.  Your health care provider will set treatment goals for you.  Keep all follow-up visits. This is important.  Follow these instructions at home:  Diabetes medicines  Take your diabetes medicines every day as told by your health care provider. List your diabetes medicines here:  Name of medicine: ______________________________  Amount (dose): _______________ Time (a.m./p.m.): _______________ Notes: ___________________________________  Name of medicine: ______________________________  Amount (dose): _______________ Time (a.m./p.m.): _______________ Notes: ___________________________________  Name of medicine: ______________________________  Amount (dose): _______________ Time (a.m./p.m.):  _______________ Notes: ___________________________________  Insulin  If you use insulin, list the types of insulin you use here:  Insulin type: ______________________________  Amount (dose): _______________ Time (a.m./p.m.): _______________Notes: ___________________________________  Insulin type: ______________________________  Amount (dose): _______________ Time (a.m./p.m.): _______________ Notes: ___________________________________  Insulin type: ______________________________  Amount (dose): _______________ Time (a.m./p.m.): _______________ Notes: ___________________________________  Insulin type: ______________________________  Amount (dose): _______________ Time (a.m./p.m.): _______________ Notes: ___________________________________  Insulin type: ______________________________  Amount (dose): _______________ Time (a.m./p.m.): _______________ Notes: ___________________________________  Managing blood glucose  Check your blood glucose levels using a glucose monitor as told by your health care provider.  Write down the times that you check your glucose levels here:  Time: _______________ Notes: ___________________________________  Time: _______________ Notes: ___________________________________  Time: _______________ Notes: ___________________________________  Time: _______________ Notes: ___________________________________  Time: _______________ Notes: ___________________________________  Time: _______________ Notes: ___________________________________    Low blood glucose  Low blood glucose (hypoglycemia) is when glucose is at or below 70 mg/dL (3.9 mmol/L). Symptoms may include:  Feeling:  Hungry.  Sweaty and clammy.  Irritable or easily upset.  Dizzy.  Sleepy.  Having:  A fast heartbeat.  A headache.  A change in your vision.  Numbness around the mouth, lips, or tongue.  Having trouble with:  Moving (coordination).  Sleeping.  Treating low blood glucose  To treat low blood glucose, eat or drink something  containing sugar right away. If you can think clearly and swallow safely, follow the 15:15 rule:  Take 15 grams of a fast-acting carb (carbohydrate), as told by your health care provider.  Some fast-acting carbs are:  Glucose tablets: take 3-4 tablets.  Hard candy: eat 3-5 pieces.  Fruit juice: drink 4 oz (120 mL).  Regular (not diet) soda: drink 4-6 oz (120-180 mL).  Honey or sugar: eat 1 Tbsp (15 mL).  Check your blood glucose levels 15 minutes after you take the carb.  If your glucose is still at or below 70 mg/dL (3.9 mmol/L), take 15 grams of a carb again.  If your glucose does not go above 70 mg/dL (3.9 mmol/L) after 3 tries, get help right away.  After your glucose goes back to normal, eat a meal or a snack within 1 hour.  Treating very low blood glucose  If your glucose is at or below 54 mg/dL (3 mmol/L), you have very low blood glucose (severe hypoglycemia).  This is an emergency. Do not wait to see if the symptoms will go away. Get medical help right away. Call your local emergency services (911 in the [place] Do not drive yourself to the hospital.  Questions to ask your health care provider  Should I talk with a diabetes educator?  What equipment will I need to care for myself at home?  What diabetes medicines do I need? When should I take them?  How often do I need to check my blood glucose levels?  What number can I call if I have questions?  When is my follow-up visit?  Where can I find a support group for people with diabetes?  Where to find more information  American Diabetes Association: www.diabetes.org  Association of Diabetes Care and Education Specialists: www.diabeteseducator.org  Contact a health care provider if:  Your blood glucose is at or above 240 mg/dL (13.3 mmol/L) for 2 days in a row.  You have been sick or have had a fever for 2 days or more, and you are not getting better.  You have any of these problems for more than 6 hours:  You cannot eat or drink.  You feel nauseous.  You  vomit.  You have diarrhea.  Get help right away if:  Your blood glucose is lower than 54 mg/dL (3 mmol/L).  You get confused.  You have trouble thinking clearly.  You have trouble breathing.  These symptoms may represent a serious problem that is an emergency. Do not wait to see if the symptoms will go away. Get medical help right away. Call your local emergency services (911 in the [place] Do not drive yourself to the hospital.  Summary  Diabetes mellitus is a chronic disease that occurs when the body does not properly use sugar (glucose) that is released from food after you eat.  Take insulin and diabetes medicines as told.  Check your blood glucose every day, as often as told.  Keep all follow-up visits. This is important.  This information is not intended to replace advice given to you by your health care provider. Make sure you discuss any questions you have with your health care provider.  Document Revised: 04/20/2021 Document Reviewed: 04/20/2021  Elsevier Patient Education © 2022 [employer] Inc[employer]

## 2023-05-31 ENCOUNTER — OFFICE VISIT (OUTPATIENT)
Dept: CARDIOLOGY | Facility: CLINIC | Age: 78
End: 2023-05-31

## 2023-05-31 VITALS
WEIGHT: 252 LBS | DIASTOLIC BLOOD PRESSURE: 76 MMHG | HEART RATE: 59 BPM | BODY MASS INDEX: 41.99 KG/M2 | HEIGHT: 65 IN | SYSTOLIC BLOOD PRESSURE: 130 MMHG

## 2023-05-31 DIAGNOSIS — I10 PRIMARY HYPERTENSION: ICD-10-CM

## 2023-05-31 DIAGNOSIS — D68.51 FACTOR 5 LEIDEN MUTATION, HETEROZYGOUS: ICD-10-CM

## 2023-05-31 DIAGNOSIS — I48.0 PAROXYSMAL ATRIAL FIBRILLATION: Primary | ICD-10-CM

## 2023-05-31 DIAGNOSIS — Z86.711 HISTORY OF PULMONARY EMBOLISM: ICD-10-CM

## 2023-05-31 PROCEDURE — 93000 ELECTROCARDIOGRAM COMPLETE: CPT | Performed by: NURSE PRACTITIONER

## 2023-05-31 PROCEDURE — 3078F DIAST BP <80 MM HG: CPT | Performed by: NURSE PRACTITIONER

## 2023-05-31 PROCEDURE — 99214 OFFICE O/P EST MOD 30 MIN: CPT | Performed by: NURSE PRACTITIONER

## 2023-05-31 PROCEDURE — 3075F SYST BP GE 130 - 139MM HG: CPT | Performed by: NURSE PRACTITIONER

## 2023-05-31 PROCEDURE — 1160F RVW MEDS BY RX/DR IN RCRD: CPT | Performed by: NURSE PRACTITIONER

## 2023-05-31 PROCEDURE — 1159F MED LIST DOCD IN RCRD: CPT | Performed by: NURSE PRACTITIONER

## 2023-05-31 NOTE — PROGRESS NOTES
"  Date of Office Visit: 2023  Encounter Provider: MIRA Pineda  Place of Service: [facility] CARDIOLOG[facility]  Patient Name: Jessica Ames  :1945    Chief Complaint   Patient presents with   • Atrial Fibrillation   :     HPI: Jessica Ames is a 77 y.o. female.  She is a patient whom we have followed for paroxysmal atrial fibrillation.  Additionally, she has factor V Leiden and has suffered a DVT and pulmonary embolus after a knee replacement surgery.   She was last seen in the office by Dr. Lo in 2022 at which time she was doing well.  Evidently she was only taking aspirin.  Dr. Lo discontinued the aspirin and started Eliquis.  Weight loss was encouraged.  She was advised to follow-up in 6 months.   She has been doing well.  She reports 1 episode of atrial fibrillation which occurred in March.  Reportedly it lasted for a couple of hours.  The only associated symptom was fatigue.  She has lost about 21 pounds on Ozempic.  She denies any chest pain, shortness of breath, edema, dizziness, syncope, bleeding difficulties or melena.    Past Medical History:   Diagnosis Date   • Abnormal ECG    • Allergic 1963   • Arthritis    • Bleeding disorder     [place] factor 5   • Cataract    • Cholelithiasis Oct. 2022   • Clotting disorder    • Coronary artery disease    • Depression    • Diabetes mellitus    • Disease of thyroid gland    • DVT (deep venous thrombosis)     right leg; s/p knee replacement; was on xarelto and now baby aspirin; followed by Dr. Parminder Mejia     • Factor V Leiden    • Fibrocystic breast    • First degree AV block 2012   • GERD (gastroesophageal reflux disease) 2012   • Hiatal hernia    • History of colon polyps     \"9 REMOVED\"   • History of medical problems Polyps removed from stomach       • Hyperlipidemia    • Hypertension    • Hypothyroidism    • IFG (impaired fasting glucose)    • Iron " "deficiency anemia     sees Dr. Clem Bowman    • Left anterior fascicular block 2012   • Lower extremity edema    • Obesity 2012   • ERIC (obstructive sleep apnea)     compliant with CPAP machine    • Osteopenia    • PAF (paroxysmal atrial fibrillation) 2015    day after colonoscopy went into atrial fibrillation; was on Xarelto and now baby aspirin    • Pulmonary embolism     also had DVT    • PVC's (premature ventricular contractions) 2012   • SSS (sick sinus syndrome)    • Venous insufficiency     followed by Dr. Parminder Mejia        Past Surgical History:   Procedure Laterality Date   • BREAST SURGERY      reduction   • CARDIAC CATHETERIZATION  3/2013   • CATH LAB PROCEDURE     •  SECTION     • CHOLECYSTECTOMY  Oct. 2022   • CHOLECYSTECTOMY WITH INTRAOPERATIVE CHOLANGIOGRAM N/A 10/28/2022    Procedure: CHOLECYSTECTOMY LAPAROSCOPIC INTRAOPERATIVE CHOLANGIOGRAM;  Surgeon: Melecio Gan MD;  Location: [facility] OR;  Service: General;  Laterality: N/A;   • COLONOSCOPY      dr. torres   • EYE SURGERY     • HAND SURGERY     • HEMORRHOIDECTOMY     • HYSTERECTOMY     • JOINT REPLACEMENT  Both knees     &    • REDUCTION MAMMAPLASTY     • ROTATOR CUFF REPAIR     • SHOULDER SURGERY Bilateral    • TOTAL KNEE ARTHROPLASTY     • UPPER GASTROINTESTINAL ENDOSCOPY      13 polyps removed from stomach.. dr torres   • UPPER GASTROINTESTINAL ENDOSCOPY  2022           Social History     Socioeconomic History   • Marital status:    Tobacco Use   • Smoking status: Never   • Smokeless tobacco: Never   • Tobacco comments:     minimal caffeine   Vaping Use   • Vaping Use: Never used   Substance and Sexual Activity   • Alcohol use: Not Currently     Comment: \" once month\"   • Drug use: Never   • Sexual activity: Yes     Partners: Male     Comment: NA       Family History   Problem Relation Age of Onset   • Stroke Mother    • Diabetes " Mother    • Hypertension Mother    • Uterine cancer Mother    • Arthritis Mother    • Cancer Mother    • Miscarriages / Stillbirths Mother    • Hypertension Father    • Heart attack Father    • Emphysema Father    • Alcohol abuse Father    • COPD Father    • Diabetes Father    • Heart disease Father    • Diabetes Sister    • Hypertension Sister    • Hearing loss Sister    • Thyroid disease Sister    • Stroke Brother    • Diabetes Brother    • Hypertension Brother    • Mental illness Maternal Grandmother    • Malig Hyperthermia Neg Hx        Review of Systems   Constitutional: Negative.   Cardiovascular: Positive for palpitations. Negative for chest pain, dyspnea on exertion, leg swelling, orthopnea, paroxysmal nocturnal dyspnea and syncope.   Respiratory: Negative.    Hematologic/Lymphatic: Negative for bleeding problem.   Musculoskeletal: Negative for falls.   Gastrointestinal: Negative for melena.   Neurological: Negative for dizziness and light-headedness.       Allergies   Allergen Reactions   • Adenosine Other (See Comments)     Paralyzed lungs and vocal chords   • Lisinopril Cough   • Celecoxib Palpitations     LE EDEMA   • Naproxen Palpitations     LE EDEMA    ALL NSAIDS   • Risedronate Palpitations   • Risedronate Sodium Palpitations     LE EDEMA   • Sulfa Antibiotics Hives         Current Outpatient Medications:   •  Acetaminophen (TYLENOL PO), Take  by mouth Daily As Needed., Disp: , Rfl:   •  apixaban (ELIQUIS) 5 MG tablet tablet, Take 1 tablet by mouth Every 12 (Twelve) Hours., Disp: 180 tablet, Rfl: 3  •  Blood Glucose Monitoring Suppl device, 1 each Take As Directed., Disp: 1 each, Rfl: 0  •  Cholecalciferol 2000 units capsule, Take 2,000 Units by mouth Daily. One PO daily, Disp: 90 each, Rfl: 3  •  Cyanocobalamin (VITAMIN B-12) 1000 MCG sublingual tablet, Place 1,000 mcg under the tongue Daily. One SL daily (Patient taking differently: Place 1 tablet under the tongue Daily. TWICE A WEEK), Disp: 90  "each, Rfl: 3  •  ezetimibe (ZETIA) 10 MG tablet, TAKE 1 TABLET BY MOUTH DAILY. FOR CHOLESTEROL, Disp: 90 tablet, Rfl: 3  •  glucose blood test strip, Use as instructed, Disp: 200 each, Rfl: 12  •  Lancets (freestyle) lancets, Check BS fasting and HS daily, Disp: 100 each, Rfl: 12  •  levothyroxine (SYNTHROID, LEVOTHROID) 75 MCG tablet, Take 1 tablet by mouth Daily. before breakfast for thyroid, Disp: 90 tablet, Rfl: 3  •  losartan (COZAAR) 50 MG tablet, TAKE 1 TABLET EVERY DAY FOR BLOOD PRESSURE, Disp: 90 tablet, Rfl: 1  •  metoprolol succinate XL (TOPROL-XL) 25 MG 24 hr tablet, TAKE 1 TABLET ONE TIME DAILY FOR HEART, Disp: 90 tablet, Rfl: 3  •  omeprazole (priLOSEC) 40 MG capsule, Take 1 capsule by mouth 2 (Two) Times a Day., Disp: 180 capsule, Rfl: 3  •  Semaglutide, 1 MG/DOSE, (OZEMPIC) 2 MG/1.5ML solution pen-injector, Inject 1 mg under the skin into the appropriate area as directed 1 (One) Time Per Week. For DMII, Disp: 9 mL, Rfl: 1  •  sertraline (ZOLOFT) 50 MG tablet, TAKE 1 TABLET EVERY DAY FOR STRESS, Disp: 90 tablet, Rfl: 3      Objective:     Vitals:    05/31/23 1349   BP: 130/76   Pulse: 59   Weight: 114 kg (252 lb)   Height: 165.1 cm (65\")     Body mass index is 41.93 kg/m².    PHYSICAL EXAM:    Neck:      Vascular: No JVD.   Pulmonary:      Effort: Pulmonary effort is normal.      Breath sounds: Normal breath sounds.   Cardiovascular:      Normal rate. Regular rhythm.      Murmurs: There is no murmur.      No gallop. No click. No rub.   Pulses:     Intact distal pulses.           ECG 12 Lead    Date/Time: 5/31/2023 1:56 PM  Performed by: Kelly Steel APRN  Authorized by: Kelly Steel APRN   Comparison: compared with previous ECG from 12/2/2022  Similar to previous ECG  Rhythm: sinus rhythm  Rate: normal  BPM: 59  Conduction: right bundle branch block, left anterior fascicular block and non-specific intraventricular conduction delay              Assessment:       Diagnosis Plan   1. " Paroxysmal atrial fibrillation  ECG 12 Lead      2. Primary hypertension        3. Factor 5 Leiden mutation, heterozygous        4. History of pulmonary embolism          Orders Placed This Encounter   Procedures   • ECG 12 Lead     This order was created via procedure documentation     Order Specific Question:   Release to patient     Answer:   Routine Release          Plan:       1.  Paroxysmal atrial fibrillation.  She is maintaining sinus rhythm.  She is rate controlled with metoprolol and anticoagulated with Eliquis.      2.  Hypertension.  Her blood pressure looks great.  Continue losartan and Toprol.      I think she is doing well.  I am not recommending any changes, and she will follow-up with Dr. Lo in 6 months.      As always, it has been a pleasure to participate in your patient's care.      Sincerely,         MIRA Horvath

## 2023-06-05 ENCOUNTER — TELEPHONE (OUTPATIENT)
Dept: FAMILY MEDICINE CLINIC | Facility: CLINIC | Age: 78
End: 2023-06-05
Payer: MEDICARE

## 2023-09-14 ENCOUNTER — HOSPITAL ENCOUNTER (OUTPATIENT)
Dept: BONE DENSITY | Facility: HOSPITAL | Age: 78
Discharge: HOME | End: 2023-09-14
Admitting: PHYSICIAN ASSISTANT
Payer: MEDICARE

## 2023-09-14 PROCEDURE — 77080 DXA BONE DENSITY AXIAL: CPT

## 2023-09-28 DIAGNOSIS — Z12.31 ENCOUNTER FOR SCREENING MAMMOGRAM FOR BREAST CANCER: ICD-10-CM

## 2023-10-10 ENCOUNTER — TELEPHONE (OUTPATIENT)
Dept: CARDIOLOGY | Facility: CLINIC | Age: 78
End: 2023-10-10
Payer: MEDICARE

## 2023-10-10 NOTE — TELEPHONE ENCOUNTER
Called and spoke with pt. She is hoping to have a epidural tomorrow. She is taking Eliquis 5 mg. They are needing clearance from you, please advise.

## 2023-10-10 NOTE — TELEPHONE ENCOUNTER
Caller: Jessica Ames    Relationship: Self    Best call back number: [phone]      PATIENT IS REQUESTING APPROVAL TO HAVE AN EPIDURAL DONE TOMORROW

## 2023-10-10 NOTE — TELEPHONE ENCOUNTER
Called and spoke with pt. She is aware. She would like to fax this to [facility].  Fax # [phone]

## 2023-10-11 NOTE — TELEPHONE ENCOUNTER
Juanita. Ortho doctors calling back wanting patient to at least hold her Eliquis for 72 hours. Really like for them to hold 3-5 days. Put will take 72 hours    [phone] opt. 2 Annabel

## 2023-11-20 ENCOUNTER — OFFICE VISIT (OUTPATIENT)
Dept: FAMILY MEDICINE CLINIC | Facility: CLINIC | Age: 78
End: 2023-11-20
Payer: MEDICARE

## 2023-11-20 VITALS
HEART RATE: 65 BPM | TEMPERATURE: 97.6 F | BODY MASS INDEX: 39.15 KG/M2 | HEIGHT: 65 IN | DIASTOLIC BLOOD PRESSURE: 74 MMHG | OXYGEN SATURATION: 96 % | SYSTOLIC BLOOD PRESSURE: 136 MMHG | WEIGHT: 235 LBS | RESPIRATION RATE: 16 BRPM

## 2023-11-20 DIAGNOSIS — E53.8 LOW SERUM VITAMIN B12: Chronic | ICD-10-CM

## 2023-11-20 DIAGNOSIS — F41.1 GENERALIZED ANXIETY DISORDER: Chronic | ICD-10-CM

## 2023-11-20 DIAGNOSIS — E55.9 VITAMIN D DEFICIENCY, UNSPECIFIED: Chronic | ICD-10-CM

## 2023-11-20 DIAGNOSIS — E55.9 VITAMIN D DEFICIENCY: Chronic | ICD-10-CM

## 2023-11-20 DIAGNOSIS — N39.42 URINARY INCONTINENCE WITHOUT SENSORY AWARENESS: ICD-10-CM

## 2023-11-20 DIAGNOSIS — E78.2 MIXED HYPERLIPIDEMIA: Chronic | ICD-10-CM

## 2023-11-20 DIAGNOSIS — D68.51 FACTOR 5 LEIDEN MUTATION, HETEROZYGOUS: Chronic | ICD-10-CM

## 2023-11-20 DIAGNOSIS — K64.9 HEMORRHOIDS, UNSPECIFIED HEMORRHOID TYPE: ICD-10-CM

## 2023-11-20 DIAGNOSIS — K21.9 GASTROESOPHAGEAL REFLUX DISEASE WITHOUT ESOPHAGITIS: Chronic | ICD-10-CM

## 2023-11-20 DIAGNOSIS — E11.9 TYPE 2 DIABETES MELLITUS WITHOUT COMPLICATION, WITHOUT LONG-TERM CURRENT USE OF INSULIN: Primary | Chronic | ICD-10-CM

## 2023-11-20 DIAGNOSIS — G47.33 OSA (OBSTRUCTIVE SLEEP APNEA): Chronic | ICD-10-CM

## 2023-11-20 DIAGNOSIS — E03.9 HYPOTHYROIDISM, ACQUIRED: Chronic | ICD-10-CM

## 2023-11-20 DIAGNOSIS — I48.0 PAROXYSMAL ATRIAL FIBRILLATION: Chronic | ICD-10-CM

## 2023-11-20 RX ORDER — LEVOTHYROXINE SODIUM 0.07 MG/1
75 TABLET ORAL DAILY
Qty: 90 TABLET | Refills: 2 | Status: SHIPPED | OUTPATIENT
Start: 2023-11-20

## 2023-11-20 RX ORDER — LOSARTAN POTASSIUM 50 MG/1
50 TABLET ORAL DAILY
Qty: 90 TABLET | Refills: 1 | Status: SHIPPED | OUTPATIENT
Start: 2023-11-20

## 2023-11-20 NOTE — PROGRESS NOTES
"Subjective   Jessica Ames is a 78 y.o. female.     Hyperlipidemia    Hypertension  Pertinent negatives include no blurred vision.   Heartburn  She reports no choking.       Since the last visit, she has overall felt well.  She has Primary Hypertension and well controlled on current medication, DMII well controlled on medication and will continue regimen, GERD controlled on PPI Rx, Hyperlipidemia on Zetia and is effective with lowering lipid values, Atrial Fibillation and remains under the care of their cardiologist for management, Hypothyroidism well controlled on current medication and will continue Rx, and Vitamin D deficiency and labs are at goal >30 ng/mL.  she has been compliant with current medications have reviewed them.  The patient has episodes constipation on Ozempic.  Will refill medications. /74   Pulse 65   Temp 97.6 °F (36.4 °C)   Resp 16   Ht 165.1 cm (65\")   Wt 107 kg (235 lb)   LMP  (LMP Unknown)   SpO2 96%   BMI 39.11 kg/m²   Weight is still down    Results for orders placed or performed in visit on 08/15/23   Comprehensive metabolic panel    Specimen: Blood   Result Value Ref Range    Glucose 103 (H) 65 - 99 mg/dL    BUN 13 8 - 23 mg/dL    Creatinine 0.91 0.57 - 1.00 mg/dL    EGFR Result 64.7 >60.0 mL/min/1.73    BUN/Creatinine Ratio 14.3 7.0 - 25.0    Sodium 139 136 - 145 mmol/L    Potassium 3.8 3.5 - 5.2 mmol/L    Chloride 103 98 - 107 mmol/L    Total CO2 26.6 22.0 - 29.0 mmol/L    Calcium 9.5 8.6 - 10.5 mg/dL    Total Protein 6.4 6.0 - 8.5 g/dL    Albumin 4.5 3.5 - 5.2 g/dL    Globulin 1.9 gm/dL    A/G Ratio 2.4 g/dL    Total Bilirubin 0.5 0.0 - 1.2 mg/dL    Alkaline Phosphatase 75 39 - 117 U/L    AST (SGOT) 15 1 - 32 U/L    ALT (SGPT) 16 1 - 33 U/L   Lipid panel    Specimen: Blood   Result Value Ref Range    Total Cholesterol 199 0 - 200 mg/dL    Triglycerides 239 (H) 0 - 150 mg/dL    HDL Cholesterol 38 (L) 40 - 60 mg/dL    VLDL Cholesterol Danny 42 (H) 5 - 40 mg/dL    LDL Chol " Calc (NIH) 119 (H) 0 - 100 mg/dL   TSH    Specimen: Blood   Result Value Ref Range    TSH 2.790 0.270 - 4.200 uIU/mL   Hemoglobin A1c    Specimen: Blood   Result Value Ref Range    Hemoglobin A1C 5.80 (H) 4.80 - 5.60 %   Vitamin D,25-Hydroxy    Specimen: Blood   Result Value Ref Range    25 Hydroxy, Vitamin D 63.2 30.0 - 100.0 ng/mL   Vitamin B12    Specimen: Blood   Result Value Ref Range    Vitamin B-12 601 211 - 946 pg/mL   Folate    Specimen: Blood   Result Value Ref Range    Folate 11.30 4.78 - 24.20 ng/mL   T4, Free    Specimen: Blood   Result Value Ref Range    Free T4 1.23 0.93 - 1.70 ng/dL   T3, Free    Specimen: Blood   Result Value Ref Range    T3, Free 2.3 2.0 - 4.4 pg/mL   Microalbumin / Creatinine Urine Ratio - Urine, Clean Catch    Specimen: Urine, Clean Catch   Result Value Ref Range    Creatinine, Urine 162.2 Not Estab. mg/dL    Microalbumin, Urine 7.6 Not Estab. ug/mL    Microalbumin/Creatinine Ratio 5 0 - 29 mg/g creat   Magnesium    Specimen: Blood   Result Value Ref Range    Magnesium 2.2 1.6 - 2.4 mg/dL   Microscopic Examination -   Result Value Ref Range    WBC, UA 3-5 (A) /HPF    RBC, UA Comment /HPF    Epithelial Cells (non renal) 7-12 (A) /HPF    Cast Type Comment     Crystal Type Comment     Mucus, UA See below: (A) /HPF    Bacteria, UA Trace (A) None Seen /HPF   CBC and Differential    Specimen: Blood   Result Value Ref Range    WBC 6.19 3.40 - 10.80 10*3/mm3    RBC 4.84 3.77 - 5.28 10*6/mm3    Hemoglobin 13.6 12.0 - 15.9 g/dL    Hematocrit 43.0 34.0 - 46.6 %    MCV 88.8 79.0 - 97.0 fL    MCH 28.1 26.6 - 33.0 pg    MCHC 31.6 31.5 - 35.7 g/dL    RDW 13.7 12.3 - 15.4 %    Platelets 151 140 - 450 10*3/mm3    Neutrophil Rel % 49.1 42.7 - 76.0 %    Lymphocyte Rel % 41.4 19.6 - 45.3 %    Monocyte Rel % 8.9 5.0 - 12.0 %    Eosinophil Rel % 0.0 (L) 0.3 - 6.2 %    Basophil Rel % 0.0 0.0 - 1.5 %    Neutrophils Absolute 3.04 1.70 - 7.00 10*3/mm3    Lymphocytes Absolute 2.56 0.70 - 3.10 10*3/mm3     Monocytes Absolute 0.55 0.10 - 0.90 10*3/mm3    Eosinophils Absolute 0.00 0.00 - 0.40 10*3/mm3    Basophils Absolute 0.00 0.00 - 0.20 10*3/mm3    Immature Granulocyte Rel % 0.6 (H) 0.0 - 0.5 %    Immature Grans Absolute 0.04 0.00 - 0.05 10*3/mm3    nRBC 0.0 0.0 - 0.2 /100 WBC   Urinalysis With Microscopic - Urine, Clean Catch    Specimen: Urine, Clean Catch   Result Value Ref Range    Specific Gravity, UA 1.025 1.005 - 1.030    pH, UA 6.0 5.0 - 8.0    Color, UA Yellow     Appearance, UA Clear Clear    Leukocytes, UA See below: (A) Negative    Protein Negative Negative    Glucose, UA Negative Negative    Ketones Negative Negative    Blood, UA Negative Negative    Bilirubin, UA Negative Negative    Urobilinogen, UA Comment     Nitrite, UA Negative Negative   Her last A1c was down to 5.8% and had been 6.5% 8 months prior.  LDL cholesterol was 119 and triglycerides were mildly elevated at 239, normal renal functions and liver enzymes also an 8/15/2023 labs.  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 Bowman  Use Cpap/Bipap every night  Saw podiatry 8-24-23 DR Juan Doherty----has done 2 lumbar epidurals and helped-=--has f/u appt tomorrow  EGD DR Cannon-----11-30-22---dilated esophagus and noted HH  Need f/u appt  Sees Dr Lo for cardio---saw MIRA Steel 5-31-23  1.  Paroxysmal atrial fibrillation.  She is maintaining sinus rhythm.  She is rate controlled with metoprolol and anticoagulated with Eliquis.        2.  Hypertension.  Her blood pressure looks great.  Continue losartan and Toprol.        I think she is doing well.  I am not recommending any changes, and she will follow-up with Dr. Lo in 6 months.  Has appt DR Lo  Normal mammo 9-26-23  DEXA Osteoporosis ---not worse 9-14-23----  She had taken Actonel 5 yrs  Declines Rx  Zoloft for anxiety----works  The following portions of the patient's  history were reviewed and updated as appropriate: allergies, current medications, past family history, past medical history, past social history, past surgical history, and problem list.    Review of Systems   Constitutional:  Negative for diaphoresis.   HENT:  Negative for nosebleeds and trouble swallowing.    Eyes:  Negative for blurred vision and visual disturbance.   Respiratory:  Negative for choking.    Gastrointestinal:  Positive for rectal pain. Negative for blood in stool.   Genitourinary:  Positive for urinary incontinence.   Allergic/Immunologic: Negative for immunocompromised state.   Neurological:  Negative for facial asymmetry and speech difficulty.   Psychiatric/Behavioral:  Negative for self-injury and suicidal ideas.        Objective   Physical Exam  Vitals and nursing note reviewed.   Constitutional:       General: She is not in acute distress.     Appearance: She is well-developed. She is not ill-appearing or toxic-appearing.   HENT:      Head: Normocephalic.      Right Ear: External ear normal.      Left Ear: External ear normal.      Nose: Nose normal.      Mouth/Throat:      Pharynx: Oropharynx is clear.   Eyes:      General: No scleral icterus.     Conjunctiva/sclera: Conjunctivae normal.      Pupils: Pupils are equal, round, and reactive to light.   Neck:      Thyroid: No thyromegaly.      Vascular: No carotid bruit.   Cardiovascular:      Rate and Rhythm: Normal rate and regular rhythm.      Heart sounds: Normal heart sounds. No murmur heard.  Pulmonary:      Effort: Pulmonary effort is normal. No respiratory distress.      Breath sounds: Normal breath sounds. No rales.   Musculoskeletal:         General: No deformity. Normal range of motion.      Cervical back: Normal range of motion and neck supple.      Right lower leg: Edema present.      Left lower leg: Edema present.      Comments: Trace pedal edema = bilat     Skin:     General: Skin is warm and dry.      Findings: No rash.    Neurological:      General: No focal deficit present.      Mental Status: She is alert and oriented to person, place, and time. Mental status is at baseline.   Psychiatric:         Mood and Affect: Mood normal.         Behavior: Behavior normal.         Thought Content: Thought content normal.         Judgment: Judgment normal.           Assessment & Plan   Diagnoses and all orders for this visit:    1. Type 2 diabetes mellitus without complication, without long-term current use of insulin (Primary)  -     Comprehensive metabolic panel; Future  -     Lipid panel; Future  -     Hemoglobin A1c; Future    2. REIC (obstructive sleep apnea)    3. Paroxysmal atrial fibrillation    4. Mixed hyperlipidemia  -     Comprehensive metabolic panel; Future  -     Lipid panel; Future  -     Hemoglobin A1c; Future    5. Gastroesophageal reflux disease without esophagitis    6. Vitamin D deficiency    7. Factor 5 Leiden mutation, heterozygous    8. Low serum vitamin B12    9. Vitamin D deficiency, unspecified     10. Hypothyroidism, acquired  -     levothyroxine (SYNTHROID, LEVOTHROID) 75 MCG tablet; Take 1 tablet by mouth Daily. For thyroid before breakfast  Dispense: 90 tablet; Refill: 2    11. Generalized anxiety disorder    12. Hemorrhoids, unspecified hemorrhoid type  -     Ambulatory Referral to Colorectal Surgery    13. Urinary incontinence without sensory awareness  -     Ambulatory Referral to Gynecologic Urology    Other orders  -     losartan (COZAAR) 50 MG tablet; Take 1 tablet by mouth Daily. for blood pressure  Dispense: 90 tablet; Refill: 1  -     Semaglutide, 1 MG/DOSE, (OZEMPIC) 2 MG/1.5ML solution pen-injector; Inject 1 mg under the skin into the appropriate area as directed 1 (One) Time Per Week. For DMII  Dispense: 9 mL; Refill: 5      Use Cpap/Bipap every night  DEXA Osteoporosis ---not worse 9-14-23----  She had taken Actonel 5 yrs  Declines Rx  Need f/u GI GERD DR Kassandra Smith, Jessica HOLLOWAY Hui, was seen today.   she was seen for DEXA Osteoporosis ---not worse 9-14-23----  She had taken Actonel 5 yrs---Declines Rx.  Can see DR MILANA Pinon for hemorrhoids  Zoloft works for anxiety  Urinary incont---work up needed--refer to urogynecology Dr. Anibal Smith, Jessica Ames, was seen today.  she was seen for HTN and continue medication, DMII and refilled medications, GERD and will continue on PPI medication, Hyperlipidemia and will continue current medication, Atrial Fibrillation and remains on medication for treatment and is stable, Atrial Fibrillation and remains under the care of their cardiologist for medical management and is stable, and Vitamin D deficiency and supplemented.  Screen carotid           Answers submitted by the patient for this visit:  Primary Reason for Visit (Submitted on 11/13/2023)  What is the primary reason for your visit?: High Blood Pressure

## 2023-11-20 NOTE — PATIENT INSTRUCTIONS
Diabetes Mellitus Basics    Diabetes mellitus, or diabetes, is a long-term (chronic) disease. It occurs when the body does not properly use sugar (glucose) that is released from food after you eat.  Diabetes mellitus may be caused by one or both of these problems:  Your pancreas does not make enough of a hormone called insulin.  Your body does not react in a normal way to the insulin that it makes.  Insulin lets glucose enter cells in your body. This gives you energy. If you have diabetes, glucose cannot get into cells. This causes high blood glucose (hyperglycemia).  How to treat and manage diabetes  You may need to take insulin or other diabetes medicines daily to keep your glucose in balance. If you are prescribed insulin, you will learn how to give yourself insulin by injection. You may need to adjust the amount of insulin you take based on the foods that you eat.  You will need to check your blood glucose levels using a glucose monitor as told by your health care provider. The readings can help determine if you have low or high blood glucose.  Generally, you should have these blood glucose levels:  Before meals (preprandial):  mg/dL (4.4-7.2 mmol/L).  After meals (postprandial): below 180 mg/dL (10 mmol/L).  Hemoglobin A1c (HbA1c) level: less than 7%.  Your health care provider will set treatment goals for you.  Keep all follow-up visits. This is important.  Follow these instructions at home:  Diabetes medicines  Take your diabetes medicines every day as told by your health care provider. List your diabetes medicines here:  Name of medicine: ______________________________  Amount (dose): _______________ Time (a.m./p.m.): _______________ Notes: ___________________________________  Name of medicine: ______________________________  Amount (dose): _______________ Time (a.m./p.m.): _______________ Notes: ___________________________________  Name of medicine: ______________________________  Amount (dose):  _______________ Time (a.m./p.m.): _______________ Notes: ___________________________________  Insulin  If you use insulin, list the types of insulin you use here:  Insulin type: ______________________________  Amount (dose): _______________ Time (a.m./p.m.): _______________Notes: ___________________________________  Insulin type: ______________________________  Amount (dose): _______________ Time (a.m./p.m.): _______________ Notes: ___________________________________  Insulin type: ______________________________  Amount (dose): _______________ Time (a.m./p.m.): _______________ Notes: ___________________________________  Insulin type: ______________________________  Amount (dose): _______________ Time (a.m./p.m.): _______________ Notes: ___________________________________  Insulin type: ______________________________  Amount (dose): _______________ Time (a.m./p.m.): _______________ Notes: ___________________________________  Managing blood glucose    Check your blood glucose levels using a glucose monitor as told by your health care provider.  Write down the times that you check your glucose levels here:  Time: _______________ Notes: ___________________________________  Time: _______________ Notes: ___________________________________  Time: _______________ Notes: ___________________________________  Time: _______________ Notes: ___________________________________  Time: _______________ Notes: ___________________________________  Time: _______________ Notes: ___________________________________    Low blood glucose  Low blood glucose (hypoglycemia) is when glucose is at or below 70 mg/dL (3.9 mmol/L). Symptoms may include:  Feeling:  Hungry.  Sweaty and clammy.  Irritable or easily upset.  Dizzy.  Sleepy.  Having:  A fast heartbeat.  A headache.  A change in your vision.  Numbness around the mouth, lips, or tongue.  Having trouble with:  Moving (coordination).  Sleeping.  Treating low blood glucose  To treat low blood  glucose, eat or drink something containing sugar right away. If you can think clearly and swallow safely, follow the 15:15 rule:  Take 15 grams of a fast-acting carb (carbohydrate), as told by your health care provider.  Some fast-acting carbs are:  Glucose tablets: take 3-4 tablets.  Hard candy: eat 3-5 pieces.  Fruit juice: drink 4 oz (120 mL).  Regular (not diet) soda: drink 4-6 oz (120-180 mL).  Honey or sugar: eat 1 Tbsp (15 mL).  Check your blood glucose levels 15 minutes after you take the carb.  If your glucose is still at or below 70 mg/dL (3.9 mmol/L), take 15 grams of a carb again.  If your glucose does not go above 70 mg/dL (3.9 mmol/L) after 3 tries, get help right away.  After your glucose goes back to normal, eat a meal or a snack within 1 hour.  Treating very low blood glucose  If your glucose is at or below 54 mg/dL (3 mmol/L), you have very low blood glucose (severe hypoglycemia).  This is an emergency. Do not wait to see if the symptoms will go away. Get medical help right away. Call your local emergency services (911 in the [place] Do not drive yourself to the hospital.  Questions to ask your health care provider  Should I talk with a diabetes educator?  What equipment will I need to care for myself at home?  What diabetes medicines do I need? When should I take them?  How often do I need to check my blood glucose levels?  What number can I call if I have questions?  When is my follow-up visit?  Where can I find a support group for people with diabetes?  Where to find more information  American Diabetes Association: www.diabetes.org  Association of Diabetes Care and Education Specialists: www.diabeteseducator.org  Contact a health care provider if:  Your blood glucose is at or above 240 mg/dL (13.3 mmol/L) for 2 days in a row.  You have been sick or have had a fever for 2 days or more, and you are not getting better.  You have any of these problems for more than 6 hours:  You cannot eat or  drink.  You feel nauseous.  You vomit.  You have diarrhea.  Get help right away if:  Your blood glucose is lower than 54 mg/dL (3 mmol/L).  You get confused.  You have trouble thinking clearly.  You have trouble breathing.  These symptoms may represent a serious problem that is an emergency. Do not wait to see if the symptoms will go away. Get medical help right away. Call your local emergency services (911 in the [place] Do not drive yourself to the hospital.  Summary  Diabetes mellitus is a chronic disease that occurs when the body does not properly use sugar (glucose) that is released from food after you eat.  Take insulin and diabetes medicines as told.  Check your blood glucose every day, as often as told.  Keep all follow-up visits. This is important.  This information is not intended to replace advice given to you by your health care provider. Make sure you discuss any questions you have with your health care provider.  Document Revised: 04/20/2021 Document Reviewed: 04/20/2021  [employer] Patient Education © 2023 [employer]

## 2023-11-21 RX ORDER — APIXABAN 5 MG/1
5 TABLET, FILM COATED ORAL EVERY 12 HOURS
Qty: 180 TABLET | Refills: 3 | Status: SHIPPED | OUTPATIENT
Start: 2023-11-21

## 2023-12-06 ENCOUNTER — OFFICE VISIT (OUTPATIENT)
Dept: CARDIOLOGY | Facility: CLINIC | Age: 78
End: 2023-12-06
Payer: MEDICARE

## 2023-12-06 VITALS
WEIGHT: 235 LBS | DIASTOLIC BLOOD PRESSURE: 94 MMHG | SYSTOLIC BLOOD PRESSURE: 164 MMHG | HEIGHT: 65 IN | BODY MASS INDEX: 39.15 KG/M2 | HEART RATE: 51 BPM

## 2023-12-06 DIAGNOSIS — D68.51 FACTOR 5 LEIDEN MUTATION, HETEROZYGOUS: ICD-10-CM

## 2023-12-06 DIAGNOSIS — Z86.711 HISTORY OF PULMONARY EMBOLISM: ICD-10-CM

## 2023-12-06 DIAGNOSIS — I10 PRIMARY HYPERTENSION: Primary | ICD-10-CM

## 2023-12-06 DIAGNOSIS — I48.0 PAROXYSMAL ATRIAL FIBRILLATION: ICD-10-CM

## 2023-12-06 DIAGNOSIS — I44.4 LEFT ANTERIOR FASCICULAR BLOCK: ICD-10-CM

## 2023-12-06 DIAGNOSIS — I45.10 RIGHT BUNDLE BRANCH BLOCK (RBBB): ICD-10-CM

## 2023-12-06 PROCEDURE — 3080F DIAST BP >= 90 MM HG: CPT | Performed by: INTERNAL MEDICINE

## 2023-12-06 PROCEDURE — 99214 OFFICE O/P EST MOD 30 MIN: CPT | Performed by: INTERNAL MEDICINE

## 2023-12-06 PROCEDURE — 93000 ELECTROCARDIOGRAM COMPLETE: CPT | Performed by: INTERNAL MEDICINE

## 2023-12-06 PROCEDURE — 3077F SYST BP >= 140 MM HG: CPT | Performed by: INTERNAL MEDICINE

## 2023-12-06 NOTE — PROGRESS NOTES
"Date of Office Visit: 23  Encounter Provider: Jason Lo MD  Place of Service: [facility] CARDIOLOGY  Patient Name: Jessica Ames  :1945  [id number]    Chief Complaint   Patient presents with    Atrial Fibrillation   :     HPI: Jessica Ames is a 78 y.o. female who has had a history of paroxysmal A-fib.  Hypertension and she is prediabetic.  She is morbidly obese and had DVT and a pulmonary embolus after knee replacement.  She has factor V Leiden.    She is here for follow-up at her last appointment we started her on Eliquis she has a CHADS2 Vascor of 3.  She is doing well she had a little bit of A-fib in October she can tell when she has it she has her Apple Watch it was on the day that she got an epidural steroid injection and then it went away and she is otherwise been okay she is lost about 38 pounds on Ozempic.  She otherwise is doing well she has a wrist blood pressure cuff at home.  She says her blood pressure is usually in the 120s and 130s otherwise has not been any change no bleeding problems      Past Medical History:   Diagnosis Date    Abnormal ECG 2005    Allergic 1963    Arthritis     Arthritis of back     Degenerative arthritis in lower back    Arthritis of neck     Bleeding disorder     [place] factor 5    Bursitis of hip 2022    Cataract     Cholelithiasis Oct. 2022    Clotting disorder 2013    Coronary artery disease 2015    Depression     Diabetes mellitus     Disease of thyroid gland     DVT (deep venous thrombosis)     right leg; s/p knee replacement; was on xarelto and now baby aspirin; followed by Dr. Parminder Mejia      Factor V Leiden     Fibrocystic breast 1986    First degree AV block 2012    GERD (gastroesophageal reflux disease) 2012    Hiatal hernia     Hip arthrosis     History of colon polyps     \"9 REMOVED\"    History of medical problems Polyps removed from stomach    2018    Hyperlipidemia     " Hypertension     Hypothyroidism     IFG (impaired fasting glucose)     Iron deficiency anemia     sees Dr. Clem Bowman     Knee swelling     Knees replaced ‘13 & ‘14    Left anterior fascicular block 2012    Low back strain     Lower extremity edema     Obesity 2012    ERIC (obstructive sleep apnea)     compliant with CPAP machine     Osteopenia     PAF (paroxysmal atrial fibrillation) 2015    day after colonoscopy went into atrial fibrillation; was on Xarelto and now baby aspirin     Periarthritis of shoulder     Spurs removed in both shoulders    Pulmonary embolism     also had DVT     PVC's (premature ventricular contractions) 2012    Rotator cuff syndrome Both shoulders torn    Surgery on both    SSS (sick sinus syndrome)     Venous insufficiency     followed by Dr. Parminder Mejia        Past Surgical History:   Procedure Laterality Date    BREAST SURGERY      reduction    CARDIAC CATHETERIZATION  3/2013    CATH LAB PROCEDURE       SECTION      CHOLECYSTECTOMY  Oct. 2022    CHOLECYSTECTOMY WITH INTRAOPERATIVE CHOLANGIOGRAM N/A 10/28/2022    Procedure: CHOLECYSTECTOMY LAPAROSCOPIC INTRAOPERATIVE CHOLANGIOGRAM;  Surgeon: Melecio Gan MD;  Location: [facility];  Service: General;  Laterality: N/A;    COLONOSCOPY      dr. torres    EYE SURGERY      HAND SURGERY      HEMORRHOIDECTOMY  1984    HYSTERECTOMY      JOINT REPLACEMENT  Both knees     &     LUMBAR EPIDURAL INJECTION N/A 10/17/2023    Pt had A Fib episode later that day    REDUCTION MAMMAPLASTY  1986    ROTATOR CUFF REPAIR      SHOULDER SURGERY Bilateral     TOTAL KNEE ARTHROPLASTY      UPPER GASTROINTESTINAL ENDOSCOPY      13 polyps removed from stomach.. dr torres    UPPER GASTROINTESTINAL ENDOSCOPY  2022           Social History     Socioeconomic History    Marital status:    Tobacco Use    Smoking status: Never    Smokeless tobacco: Never     "Tobacco comments:     minimal caffeine   Vaping Use    Vaping Use: Never used   Substance and Sexual Activity    Alcohol use: Yes     Comment: \" once month\"    Drug use: Never    Sexual activity: Yes     Partners: Male     Comment: NA       Family History   Problem Relation Age of Onset    Stroke Mother     Diabetes Mother     Hypertension Mother     Uterine cancer Mother     Arthritis Mother     Cancer Mother     Miscarriages / Stillbirths Mother     Osteoporosis Mother     Hypertension Father     Heart attack Father     Emphysema Father     Alcohol abuse Father     COPD Father     Diabetes Father     Heart disease Father     Diabetes Sister     Hypertension Sister     Hearing loss Sister     Thyroid disease Sister     Stroke Brother     Diabetes Brother     Hypertension Brother     Mental illness Maternal Grandmother     Malig Hyperthermia Neg Hx        Review of Systems   Constitutional: Negative for decreased appetite, fever, malaise/fatigue and weight loss.   HENT:  Negative for nosebleeds.    Eyes:  Negative for double vision.   Cardiovascular:  Negative for chest pain, claudication, cyanosis, dyspnea on exertion, irregular heartbeat, leg swelling, near-syncope, orthopnea, palpitations, paroxysmal nocturnal dyspnea and syncope.   Respiratory:  Negative for cough, hemoptysis and shortness of breath.    Hematologic/Lymphatic: Negative for bleeding problem.   Skin:  Negative for rash.   Musculoskeletal:  Negative for falls and myalgias.   Gastrointestinal:  Negative for hematochezia, jaundice, melena, nausea and vomiting.   Genitourinary:  Negative for hematuria.   Neurological:  Negative for dizziness and seizures.   Psychiatric/Behavioral:  Negative for altered mental status and memory loss.        Allergies   Allergen Reactions    Adenosine Other (See Comments)     Paralyzed lungs and vocal chords    Lisinopril Cough    Other Other (See Comments)    Celecoxib Palpitations     LE EDEMA    Naproxen Palpitations " "    LE EDEMA    ALL NSAIDS    Risedronate Palpitations    Risedronate Sodium Palpitations     LE EDEMA    Sulfa Antibiotics Hives         Current Outpatient Medications:     Acetaminophen (TYLENOL PO), Take  by mouth Daily As Needed., Disp: , Rfl:     Blood Glucose Monitoring Suppl device, 1 each Take As Directed., Disp: 1 each, Rfl: 0    Cholecalciferol 2000 units capsule, Take 2,000 Units by mouth Daily. One PO daily, Disp: 90 each, Rfl: 3    Cyanocobalamin (VITAMIN B-12) 1000 MCG sublingual tablet, Place 1,000 mcg under the tongue Daily. One SL daily (Patient taking differently: Place 1 tablet under the tongue Daily. TWICE A WEEK), Disp: 90 each, Rfl: 3    Eliquis 5 MG tablet tablet, TAKE 1 TABLET EVERY 12 HOURS, Disp: 180 tablet, Rfl: 3    ezetimibe (ZETIA) 10 MG tablet, TAKE 1 TABLET BY MOUTH DAILY. FOR CHOLESTEROL, Disp: 90 tablet, Rfl: 3    glucose blood test strip, Use as instructed, Disp: 200 each, Rfl: 12    Lancets (freestyle) lancets, Check BS fasting and HS daily, Disp: 100 each, Rfl: 12    levothyroxine (SYNTHROID, LEVOTHROID) 75 MCG tablet, Take 1 tablet by mouth Daily. For thyroid before breakfast, Disp: 90 tablet, Rfl: 2    losartan (COZAAR) 50 MG tablet, Take 1 tablet by mouth Daily. for blood pressure, Disp: 90 tablet, Rfl: 1    metoprolol succinate XL (TOPROL-XL) 25 MG 24 hr tablet, TAKE 1 TABLET ONE TIME DAILY FOR HEART, Disp: 90 tablet, Rfl: 3    omeprazole (priLOSEC) 40 MG capsule, Take 1 capsule by mouth 2 (Two) Times a Day., Disp: 180 capsule, Rfl: 3    Semaglutide, 1 MG/DOSE, (OZEMPIC) 2 MG/1.5ML solution pen-injector, Inject 1 mg under the skin into the appropriate area as directed 1 (One) Time Per Week. For DMII, Disp: 9 mL, Rfl: 5    sertraline (ZOLOFT) 50 MG tablet, TAKE 1 TABLET EVERY DAY FOR STRESS, Disp: 90 tablet, Rfl: 3      Objective:     Vitals:    12/06/23 1012   BP: 164/94   Pulse: 51   Weight: 107 kg (235 lb)   Height: 165.1 cm (65\")     Body mass index is 39.11 " kg/m².    Constitutional:       Appearance: Well-developed.   Eyes:      General: No scleral icterus.  HENT:      Head: Normocephalic.   Neck:      Thyroid: No thyromegaly.      Vascular: No JVD.      Lymphadenopathy: No cervical adenopathy.   Pulmonary:      Effort: Pulmonary effort is normal.      Breath sounds: Normal breath sounds. No wheezing. No rales.   Cardiovascular:      Normal rate. Regular rhythm.      No gallop.    Edema:     Peripheral edema absent.   Abdominal:      Palpations: Abdomen is soft.      Tenderness: There is no abdominal tenderness.   Musculoskeletal: Normal range of motion. Skin:     General: Skin is warm and dry.      Findings: No rash.   Neurological:      Mental Status: Alert and oriented to person, place, and time.           ECG 12 Lead    Date/Time: 12/6/2023 10:26 AM  Performed by: Jason Lo MD    Authorized by: Jason Lo MD  Comparison: compared with previous ECG   Similar to previous ECG  Rhythm: sinus rhythm  Conduction: right bundle branch block, left anterior fascicular block and 1st degree AV block    Clinical impression: abnormal EKG           Assessment:       Diagnosis Plan   1. Primary hypertension        2. Paroxysmal atrial fibrillation        3. History of pulmonary embolism        4. Factor 5 Leiden mutation, heterozygous        5. Right bundle branch block (RBBB)        6. Left anterior fascicular block                 Plan:       Well I think she is okay she has a little bit of A-fib I encouraged her to try and continue losing the weight that will help with the A-fib and risk modification I have asked her to get a blood pressure cuff that goes around her arm at home and monitor it and if it stays over 140 or over 90 to let us know I rechecked it today and got 140/80 here.  I will have her come back and see Kelly in a year and see me in 2    No follow-ups on file.     As always, it has been a pleasure to participate in your patient's  care.      Sincerely,       Jason Lo MD

## 2023-12-14 ENCOUNTER — TELEPHONE (OUTPATIENT)
Dept: FAMILY MEDICINE CLINIC | Facility: CLINIC | Age: 78
End: 2023-12-14
Payer: MEDICARE

## 2023-12-14 NOTE — TELEPHONE ENCOUNTER
Patient's need to do a video visit before I would prescribe Paxil of it or anything else for COVID due to concern of drug interaction and kidney functions

## 2023-12-14 NOTE — TELEPHONE ENCOUNTER
Caller: Jessica Ames    Relationship: Self    Best call back number: [phone]     What was the call regarding: PATIENT WAS CALLING TO CHECK THE STATUS OF REQUEST.      PLEASE ADVISE

## 2023-12-14 NOTE — TELEPHONE ENCOUNTER
Caller: Jessica Ames    Relationship to patient: Self    Best call back number: [phone]    Date of positive COVID19 test: 12/14/2023    Date of possible COVID19 exposure: 12/10/2023    COVID19 symptoms: FEVER/CHILLS, STUFFINESS, RUNNY NOSE, BODY ACHES, SHORTNESS OF AIR, LOSS OF TASTE OR SMELL    Date of initial quarantine: 12/13/2023    Additional information or concerns: THE PATIENT WOULD LIKE TO KNOW IF THERE ARE ANY MEDICATIONS THAT COULD HELP EASE HER SYMPTOMS. THE PATIENT IS CURRENTLY USING OTC TYLENOL. PLEASE ADVISE.     What is the patients preferred pharmacy: [facility] [phone] - [facility] [address] AT [facility] [phone] [facility] [phone] FX

## 2023-12-15 NOTE — TELEPHONE ENCOUNTER
Called and spoke with patient and she stated that she was aware, due to a message sent to her by Hortencia Jackman through [facility].  She is just going to wait it out and not schedule an appt at this time

## 2023-12-15 NOTE — TELEPHONE ENCOUNTER
Hub staff attempted to follow warm transfer process and was unsuccessful     Caller: Jessica Ames    Relationship to patient: Self    Best call back number: [phone]     Patient is needing: PATIENT WAS CALLING TO CHECK ON THIS REQUEST, PLEASE ADVISE. [facility] UNABLE TO READ PROVIDER MESSAGE.

## 2024-01-04 ENCOUNTER — OFFICE VISIT (OUTPATIENT)
Dept: SURGERY | Facility: CLINIC | Age: 79
End: 2024-01-04
Payer: MEDICARE

## 2024-01-04 VITALS
DIASTOLIC BLOOD PRESSURE: 68 MMHG | HEIGHT: 65 IN | HEART RATE: 61 BPM | WEIGHT: 239.7 LBS | SYSTOLIC BLOOD PRESSURE: 130 MMHG | BODY MASS INDEX: 39.94 KG/M2 | OXYGEN SATURATION: 98 %

## 2024-01-04 DIAGNOSIS — K64.8 INTERNAL HEMORRHOIDS WITH COMPLICATION: Primary | ICD-10-CM

## 2024-01-04 DIAGNOSIS — K64.4 EXTERNAL HEMORRHOIDS WITH COMPLICATION: ICD-10-CM

## 2024-01-04 DIAGNOSIS — K58.2 IRRITABLE BOWEL SYNDROME WITH BOTH CONSTIPATION AND DIARRHEA: ICD-10-CM

## 2024-01-04 PROCEDURE — 1159F MED LIST DOCD IN RCRD: CPT | Performed by: PHYSICIAN ASSISTANT

## 2024-01-04 PROCEDURE — 3075F SYST BP GE 130 - 139MM HG: CPT | Performed by: PHYSICIAN ASSISTANT

## 2024-01-04 PROCEDURE — 46600 DIAGNOSTIC ANOSCOPY SPX: CPT | Performed by: PHYSICIAN ASSISTANT

## 2024-01-04 PROCEDURE — 99204 OFFICE O/P NEW MOD 45 MIN: CPT | Performed by: PHYSICIAN ASSISTANT

## 2024-01-04 PROCEDURE — 1160F RVW MEDS BY RX/DR IN RCRD: CPT | Performed by: PHYSICIAN ASSISTANT

## 2024-01-04 PROCEDURE — 3078F DIAST BP <80 MM HG: CPT | Performed by: PHYSICIAN ASSISTANT

## 2024-01-04 NOTE — PROGRESS NOTES
"Jessica Ames is a 78 y.o. female who is seen as a consult at the request of Hortencia Jackman PA-C for hemorrhoids.      HPI:  The patient states that she was diagnosed with prediabetes last spring and was started on Ozempic, which made her constipated, which in turn flared her hemorrhoids.  Her rectal pain and bleeding have been worsening over the last few months.  She notes blood on the toilet paper.  She has alternating constipation and diarrhea.  Her hemorrhoid symptoms affect her 2 times per week on average.  She has a bowel movement every 2 days, [place] 4 stool consistency.  Positive straining.  Takes fiber supplement-2 teaspoons daily.  She uses over-the-counter Preparation H for hemorrhoids occasionally.  Her hemorrhoid symptoms and change in bowel habits occurred when she started the Ozempic.    She had hemorrhoid surgery in 1986.  She states that she had a colonoscopy 5 or 6 years ago by Dr. Osuna with no polyps noted and 10-year recall.    Past Medical History:   Diagnosis Date    Abnormal ECG 2005    Allergic 1963    Arthritis     Arthritis of back 2000    Degenerative arthritis in lower back    Arthritis of neck     Bleeding disorder 2013    [place] factor 5    Bursitis of hip March 2022    Cataract 2015    Cholelithiasis Oct. 2022    Clotting disorder 2013    Coronary artery disease 2015    Depression 2021    Diabetes mellitus 2022    Disease of thyroid gland     DVT (deep venous thrombosis) 2013    right leg; s/p knee replacement; was on xarelto and now baby aspirin; followed by Dr. Parminder Mejia      Factor V Leiden     Fibrocystic breast 1986    First degree AV block 12/07/2012    GERD (gastroesophageal reflux disease) 12/07/2012    Hiatal hernia     Hip arthrosis     History of colon polyps     \"9 REMOVED\"    History of medical problems Polyps removed from stomach    2018    Hyperlipidemia     Hypertension     Hypothyroidism 1982    IFG (impaired fasting glucose)     Iron deficiency anemia     sees  " Clem Bowman     Knee swelling     Knees replaced ‘13 & ‘14    Left anterior fascicular block 2012    Low back strain     Lower extremity edema     Obesity 2012    ERIC (obstructive sleep apnea)     compliant with CPAP machine     Osteopenia     PAF (paroxysmal atrial fibrillation) 2015    day after colonoscopy went into atrial fibrillation; was on Xarelto and now baby aspirin     Periarthritis of shoulder     Spurs removed in both shoulders    Pulmonary embolism     also had DVT     PVC's (premature ventricular contractions) 2012    Rectal bleeding     Rotator cuff syndrome Both shoulders torn    Surgery on both    SSS (sick sinus syndrome)     Venous insufficiency     followed by Dr. Parminder Mejia        Past Surgical History:   Procedure Laterality Date    BREAST SURGERY      reduction    CARDIAC CATHETERIZATION  3/2013    CATH LAB PROCEDURE       SECTION      CHOLECYSTECTOMY WITH INTRAOPERATIVE CHOLANGIOGRAM N/A 10/28/2022    Procedure: CHOLECYSTECTOMY LAPAROSCOPIC INTRAOPERATIVE CHOLANGIOGRAM;  Surgeon: Melecio Gan MD;  Location: [facility];  Service: General;  Laterality: N/A;    COLONOSCOPY N/A     dr. torres    EYE SURGERY      HAND SURGERY      HEMORRHOIDECTOMY N/A     HYSTERECTOMY      JOINT REPLACEMENT  Both knees     &     LUMBAR EPIDURAL INJECTION N/A 10/17/2023    Pt had A Fib episode later that day    REDUCTION MAMMAPLASTY  1986    ROTATOR CUFF REPAIR      SHOULDER SURGERY Bilateral     TOTAL KNEE ARTHROPLASTY      UPPER GASTROINTESTINAL ENDOSCOPY      13 polyps removed from stomach.. dr torres    UPPER GASTROINTESTINAL ENDOSCOPY  2022           Social History:   reports that she has never smoked. She has never been exposed to tobacco smoke. She has never used smokeless tobacco. She reports current alcohol use. She reports that she does not use drugs.      Marriage status:     Family History    Problem Relation Age of Onset    Stroke Mother     Diabetes Mother     Hypertension Mother     Uterine cancer Mother     Arthritis Mother     Cancer Mother     Miscarriages / Stillbirths Mother     Osteoporosis Mother     Hypertension Father     Heart attack Father     Emphysema Father     Alcohol abuse Father     COPD Father     Diabetes Father     Heart disease Father     Diabetes Sister     Hypertension Sister     Hearing loss Sister     Thyroid disease Sister     Stroke Brother     Diabetes Brother     Hypertension Brother     Mental illness Maternal Grandmother     Malig Hyperthermia Neg Hx          Current Outpatient Medications:     Acetaminophen (TYLENOL PO), Take  by mouth Daily As Needed., Disp: , Rfl:     Blood Glucose Monitoring Suppl device, 1 each Take As Directed., Disp: 1 each, Rfl: 0    Cholecalciferol 2000 units capsule, Take 2,000 Units by mouth Daily. One PO daily, Disp: 90 each, Rfl: 3    Cyanocobalamin (VITAMIN B-12) 1000 MCG sublingual tablet, Place 1,000 mcg under the tongue Daily. One SL daily (Patient taking differently: Place 1 tablet under the tongue Daily. TWICE A WEEK), Disp: 90 each, Rfl: 3    Eliquis 5 MG tablet tablet, TAKE 1 TABLET EVERY 12 HOURS, Disp: 180 tablet, Rfl: 3    ezetimibe (ZETIA) 10 MG tablet, TAKE 1 TABLET BY MOUTH DAILY. FOR CHOLESTEROL, Disp: 90 tablet, Rfl: 3    glucose blood test strip, Use as instructed, Disp: 200 each, Rfl: 12    Lancets (freestyle) lancets, Check BS fasting and HS daily, Disp: 100 each, Rfl: 12    levothyroxine (SYNTHROID, LEVOTHROID) 75 MCG tablet, Take 1 tablet by mouth Daily. For thyroid before breakfast, Disp: 90 tablet, Rfl: 2    losartan (COZAAR) 50 MG tablet, Take 1 tablet by mouth Daily. for blood pressure, Disp: 90 tablet, Rfl: 1    metoprolol succinate XL (TOPROL-XL) 25 MG 24 hr tablet, TAKE 1 TABLET ONE TIME DAILY FOR HEART, Disp: 90 tablet, Rfl: 3    omeprazole (priLOSEC) 40 MG capsule, Take 1 capsule by mouth 2 (Two) Times a Day.,  Disp: 180 capsule, Rfl: 3    Semaglutide, 1 MG/DOSE, (OZEMPIC) 2 MG/1.5ML solution pen-injector, Inject 1 mg under the skin into the appropriate area as directed 1 (One) Time Per Week. For DMII, Disp: 9 mL, Rfl: 5    sertraline (ZOLOFT) 50 MG tablet, TAKE 1 TABLET EVERY DAY FOR STRESS, Disp: 90 tablet, Rfl: 3    Allergy  Adenosine, Lisinopril, Celecoxib, Naproxen, Risedronate, and Sulfa antibiotics    Vitals:    01/04/24 0955   BP: 130/68   Pulse: 61   SpO2: 98%   -  Body mass index is 39.89 kg/m².    Physical Exam  No acute distress  Chaperone present  Perianal exam: external hemorrhoids normal. MAGIGE: no masses. Anoscopy performed: Grade 2-3 x 3 internal hemorrhoids    Review of Medical Record:  I reviewed records as noted in HPI.    Assessment:  1. Internal hemorrhoids with complication    2. External hemorrhoids with complication    3. Irritable bowel syndrome with both constipation and diarrhea        Plan:  For the hemorrhoids, I advised the patient to increase her fiber supplement to a daily total intake of 30 to 40 g for stool consistency.  I provided detailed instructions.  I encouraged adequate water intake and MiraLAX as needed to keep stools soft.    I prescribed Anusol cream for hemorrhoids and gave instructions for use, including that cream is not for daily long-term use.   Follow up in 6 weeks for reevaluation. Call with any questions, concerns, or worsening symptoms.  We will request colonoscopy report from [facility] on [address]        Ashley Melton PA-C  Physician Assistant  Colorectal Surgery

## 2024-01-05 ENCOUNTER — TELEPHONE (OUTPATIENT)
Dept: SURGERY | Facility: CLINIC | Age: 79
End: 2024-01-05

## 2024-01-05 RX ORDER — HYDROCORTISONE 25 MG/G
CREAM TOPICAL
Qty: 30 G | Refills: 1 | Status: SHIPPED | OUTPATIENT
Start: 2024-01-05

## 2024-01-05 NOTE — TELEPHONE ENCOUNTER
Caller: MICHEL MONDRAGON    Relationship: SELF    Best call back number:[phone]    Requested Prescriptions:   Requested Prescriptions      No prescriptions requested or ordered in this encounter      Anusol cream     Pharmacy where request should be sent:    [facility]  [address] [place] [address] [phone]    Last office visit with prescribing clinician: 1/4/2024   Last telemedicine visit with prescribing clinician: Visit date not found   Next office visit with prescribing clinician: 2/15/2024     Additional details provided by patient: PT CALLED TO CHECK ON RX FOR Anusol cream. PER MAKENNA BEARDEN OFFICE NOTE FROM 1-4-24 (I prescribed Anusol cream for hemorrhoids and gave instructions for use, including that cream is not for daily long-term use.)    Does the patient have less than a 3 day supply:  [x] Yes  [] No    Would you like a call back once the refill request has been completed: [x] Yes [] No    If the office needs to give you a call back, can they leave a voicemail: [x] Yes [] No    Martin Santos Rep   01/05/24 09:45 EST

## 2024-02-05 ENCOUNTER — TELEPHONE (OUTPATIENT)
Dept: FAMILY MEDICINE CLINIC | Facility: CLINIC | Age: 79
End: 2024-02-05
Payer: MEDICARE

## 2024-02-05 ENCOUNTER — TELEPHONE (OUTPATIENT)
Dept: FAMILY MEDICINE CLINIC | Facility: CLINIC | Age: 79
End: 2024-02-05

## 2024-02-05 NOTE — TELEPHONE ENCOUNTER
Caller: Jessica Ames    Relationship to patient: Self    Best call back number: [phone]     Type of visit: LABS    Requested date: 2/8/24    Additional notes: PATIENT REQUESTS CALL BACK TO SCHEDULE LABS

## 2024-02-05 NOTE — TELEPHONE ENCOUNTER
Caller: Jessica Ames    Relationship to patient: Self    Best call back number: [phone]     Type of visit: LABS    Requested date: 02.08.24     If rescheduling, when is the original appointment: N/A     Additional notes: HUB ATTEMPTED TO WARM TRANSFER, BUT WAS UNSUCCESSFUL. PLEASE CALL PATIENT TO SCHEDULE LABS. PATIENT DOES HAVE ACTIVE ORDERS.

## 2024-04-23 RX ORDER — LOSARTAN POTASSIUM 50 MG/1
50 TABLET ORAL DAILY
Qty: 90 TABLET | Refills: 0 | Status: SHIPPED | OUTPATIENT
Start: 2024-04-23

## 2024-05-06 ENCOUNTER — TELEPHONE (OUTPATIENT)
Dept: CARDIOLOGY | Facility: CLINIC | Age: 79
End: 2024-05-06
Payer: MEDICARE

## 2024-05-30 PROBLEM — M79.89 LEG SWELLING: Status: ACTIVE | Noted: 2024-05-30

## 2024-06-11 ENCOUNTER — OFFICE VISIT (OUTPATIENT)
Dept: ORTHOPEDIC SURGERY | Facility: CLINIC | Age: 79
End: 2024-06-11
Payer: MEDICARE

## 2024-06-11 VITALS — BODY MASS INDEX: 39.99 KG/M2 | WEIGHT: 240 LBS | TEMPERATURE: 97.5 F | HEIGHT: 65 IN

## 2024-06-11 DIAGNOSIS — M70.61 GREATER TROCHANTERIC BURSITIS OF RIGHT HIP: ICD-10-CM

## 2024-06-11 DIAGNOSIS — M19.011 PRIMARY OSTEOARTHRITIS OF RIGHT SHOULDER: ICD-10-CM

## 2024-06-11 DIAGNOSIS — Z96.653 STATUS POST TOTAL BILATERAL KNEE REPLACEMENT: ICD-10-CM

## 2024-06-11 DIAGNOSIS — Z91.81 HISTORY OF FALL: Primary | ICD-10-CM

## 2024-06-11 DIAGNOSIS — S80.01XA CONTUSION OF RIGHT KNEE, INITIAL ENCOUNTER: ICD-10-CM

## 2024-06-11 DIAGNOSIS — M47.816 OSTEOARTHRITIS OF LUMBAR SPINE, UNSPECIFIED SPINAL OSTEOARTHRITIS COMPLICATION STATUS: ICD-10-CM

## 2024-06-11 RX ORDER — METHYLPREDNISOLONE 4 MG/1
TABLET ORAL
Qty: 21 TABLET | Refills: 0 | Status: SHIPPED | OUTPATIENT
Start: 2024-06-11

## 2024-06-11 RX ADMIN — METHYLPREDNISOLONE ACETATE 80 MG: 80 INJECTION, SUSPENSION INTRA-ARTICULAR; INTRALESIONAL; INTRAMUSCULAR; SOFT TISSUE at 13:28

## 2024-06-11 RX ADMIN — METHYLPREDNISOLONE ACETATE 80 MG: 80 INJECTION, SUSPENSION INTRA-ARTICULAR; INTRALESIONAL; INTRAMUSCULAR; SOFT TISSUE at 13:26

## 2024-06-11 RX ADMIN — LIDOCAINE HYDROCHLORIDE 2 ML: 10 INJECTION, SOLUTION EPIDURAL; INFILTRATION; INTRACAUDAL; PERINEURAL at 13:26

## 2024-06-11 NOTE — PROGRESS NOTES
Patient Name: Jessica Ames   YOB: 1945  Referring Primary Care Physician: Hortencia Jackman PA-C  BMI: Body mass index is 39.94 kg/m².    Chief Complaint:    Chief Complaint   Patient presents with    Right Knee - Pain    Right Shoulder - Pain        HPI: Jessica presents with her  she fell about a week ago over a footstool holding her dog and landed on her right knee and has right hip pain and right shoulder pain.  She had a total knee arthroplasty in the right knee in 2013 is doing well she has some swelling.  The right shoulder is painful and her right hip is tender to palpation on the outside.  She has a long history of spinal stenosis gets epidurals and is working on diet and exercise and has followed with Rica Trinh.  She has right hip bursa pain that is flared up today after the fall as well that she would like to be evaluated .    Jessica Ames is a 78 y.o. female who presents today for evaluation of   Chief Complaint   Patient presents with    Right Knee - Pain    Right Shoulder - Pain   .       Subjective   Medications:   Home Medications:  Current Outpatient Medications on File Prior to Visit   Medication Sig    Acetaminophen (TYLENOL PO) Take  by mouth Daily As Needed.    Blood Glucose Monitoring Suppl device 1 each Take As Directed.    Cholecalciferol 2000 units capsule Take 2,000 Units by mouth Daily. One PO daily    Cyanocobalamin (VITAMIN B-12) 1000 MCG sublingual tablet Place 1,000 mcg under the tongue Daily. One SL daily (Patient taking differently: Place 1 tablet under the tongue Daily. TWICE A WEEK)    Eliquis 5 MG tablet tablet TAKE 1 TABLET EVERY 12 HOURS    ezetimibe (ZETIA) 10 MG tablet TAKE 1 TABLET BY MOUTH DAILY. FOR CHOLESTEROL    glucose blood test strip Use as instructed    Hydrocortisone, Perianal, (Anusol-HC) 2.5 % rectal cream Apply to hemorrhoids 3 times daily for 7 days during hemorrhoid flare. Include applicator. Use for 7 days, then take a break for 7 days  before resuming this pattern.    Lancets (freestyle) lancets Check BS fasting and HS daily    levothyroxine (SYNTHROID, LEVOTHROID) 75 MCG tablet Take 1 tablet by mouth Daily. For thyroid before breakfast    losartan (COZAAR) 25 MG tablet Take 1 tablet by mouth Daily.    metoprolol succinate XL (TOPROL-XL) 25 MG 24 hr tablet TAKE 1 TABLET ONE TIME DAILY FOR HEART    omeprazole (priLOSEC) 40 MG capsule Take 1 capsule by mouth 2 (Two) Times a Day.    Semaglutide, 1 MG/DOSE, (OZEMPIC) 2 MG/1.5ML solution pen-injector Inject 1 mg under the skin into the appropriate area as directed 1 (One) Time Per Week. For DMII    sertraline (ZOLOFT) 50 MG tablet TAKE 1 TABLET EVERY DAY FOR STRESS    ASPIRIN 81 PO Take 81 mg by mouth Daily. Aspirin 81 MG Tablet (Sig :1 tablet) QD (Patient not taking: Reported on 6/11/2024)    levothyroxine (SYNTHROID, LEVOTHROID) 50 MCG tablet Take 1 tablet by mouth Daily. (Patient not taking: Reported on 6/11/2024)    losartan (COZAAR) 50 MG tablet Take 1 tablet by mouth Daily. for blood pressure---make appt (Patient not taking: Reported on 6/11/2024)     No current facility-administered medications on file prior to visit.     Current Medications:  Scheduled Meds:  Continuous Infusions:No current facility-administered medications for this visit.    PRN Meds:.    I have reviewed the patient's medical history in detail and updated the computerized patient record.  Review and summarization of old records includes:    Past Medical History:   Diagnosis Date    Abnormal ECG 2005    Allergic 1963    Arthritis     Arthritis of back 2000    Degenerative arthritis in lower back    Arthritis of neck     Bleeding disorder 2013    [place] factor 5    Bursitis of hip March 2022    Cataract 2015    Cholelithiasis Oct. 2022    Clotting disorder 2013    Coronary artery disease 2015    Depression 2021    Diabetes mellitus 2022    Disease of thyroid gland     DVT (deep venous thrombosis) 2013    right leg; s/p knee  "replacement; was on xarelto and now baby aspirin; followed by Dr. Parminder Mejia      Essential (primary) hypertension 2016    Factor V Leiden     Fibrocystic breast 1986    First degree AV block 2012    GERD (gastroesophageal reflux disease) 2012    Hematuria, microscopic     Negative urological workup with Dr. Concepcion 6/3/2024    Hiatal hernia     Hip arthrosis     History of colon polyps     \"9 REMOVED\"    History of medical problems Polyps removed from stomach        Hypercholesterolemia     unknown    Hyperlipidemia     Hyperlipidemia 2016    Hypertension     Hypothyroidism 1982    Hypothyroidism, unspecified 2016    IFG (impaired fasting glucose)     Iron deficiency anemia     sees Dr. Clem Bowman     Knee swelling     Knees replaced ‘13 & ‘14    Left anterior fascicular block 2012    Low back strain     Lower extremity edema     Obesity 2012    ERIC (obstructive sleep apnea)     compliant with CPAP machine     Osteoarthrosis 2016    leg    Osteopenia     PAF (paroxysmal atrial fibrillation) 2015    day after colonoscopy went into atrial fibrillation; was on Xarelto and now baby aspirin     Periarthritis of shoulder     Spurs removed in both shoulders    Phlebitis     unknown    Postphlebitic syndrome without complication 2016    Right Lower Extremity    Pulmonary embolism 2013    also had DVT     PVC's (premature ventricular contractions) 2012    Rectal bleeding     Rotator cuff syndrome Both shoulders torn    Surgery on both    Sleep apnea     unknown    SSS (sick sinus syndrome)     Thyroid disease     unknown    Venous insufficiency     followed by Dr. Parminder Mejia         Past Surgical History:   Procedure Laterality Date    BREAST SURGERY      reduction    CARDIAC CATHETERIZATION  3/2013    CATH LAB PROCEDURE       SECTION      CHOLECYSTECTOMY WITH INTRAOPERATIVE CHOLANGIOGRAM N/A 10/28/2022    Procedure: " "CHOLECYSTECTOMY LAPAROSCOPIC INTRAOPERATIVE CHOLANGIOGRAM;  Surgeon: Melecio Gan MD;  Location: [facility] MAIN OR;  Service: General;  Laterality: N/A;    COLONOSCOPY N/A 2018    dr. torres    COLONOSCOPY  2017    EYE SURGERY  2015    HAND SURGERY  2005    joint removal    HEMORRHOIDECTOMY N/A 1986    HYSTERECTOMY  1984    JOINT REPLACEMENT  Both knees    2013 & 2014    KNEE SURGERY  01/2013    LUMBAR EPIDURAL INJECTION N/A 10/17/2023    Pt had A Fib episode later that day    REDUCTION MAMMAPLASTY  1986    ROTATOR CUFF REPAIR      SHOULDER SURGERY Bilateral 1996    TOTAL KNEE ARTHROPLASTY      UPPER GASTROINTESTINAL ENDOSCOPY  2018    13 polyps removed from stomach.. dr torres    UPPER GASTROINTESTINAL ENDOSCOPY  11/30/2022            Social History     Occupational History    Not on file   Tobacco Use    Smoking status: Never     Passive exposure: Never    Smokeless tobacco: Never    Tobacco comments:     minimal caffeine; Patient does not smoke.   Vaping Use    Vaping status: Never Used   Substance and Sexual Activity    Alcohol use: Yes     Comment: \" once month\"; Patient is non drinker    Drug use: Never     Comment: Drug Abuse: none    Sexual activity: Yes     Partners: Male     Comment: NA      Social History     Social History Narrative    Not on file        Family History   Problem Relation Age of Onset    Stroke Mother     Diabetes Mother     Hypertension Mother     Uterine cancer Mother     Arthritis Mother     Cancer Mother     Miscarriages / Stillbirths Mother     Osteoporosis Mother     Hypertension Father     Heart attack Father     Emphysema Father     Alcohol abuse Father     COPD Father     Diabetes Father     Heart disease Father     Diabetes Sister     Hypertension Sister     Hearing loss Sister     Thyroid disease Sister     Stroke Brother     Diabetes Brother     Hypertension Brother     Mental illness Maternal Grandmother     Diabetes Other     Malig Hyperthermia Neg Hx  " "      ROS: 14 point review of systems was performed and all other systems were reviewed and are negative except for documented findings in HPI and today's encounter.     Allergies:   Allergies   Allergen Reactions    Adenosine Other (See Comments)     Paralyzed lungs and vocal chords    Lisinopril Cough    Other Unknown - High Severity and Unknown - Low Severity     ANTI-INFLAMMATORY      Celecoxib Palpitations     LE EDEMA    Naproxen Palpitations     LE EDEMA    ALL NSAIDS    Risedronate Swelling and Palpitations     LEG EDEMA    Sulfa Antibiotics Hives     Constitutional:  Denies fever, shaking or chills   Eyes:  Denies change in visual acuity   HENT:  Denies nasal congestion or sore throat   Respiratory:  Denies cough or shortness of breath   Cardiovascular:  Denies chest pain or severe LE edema   GI:  Denies abdominal pain, nausea, vomiting, bloody stools or diarrhea   Musculoskeletal:  Numbness, tingling, pain, or loss of motor function only as noted above in history of present illness.  : Denies painful urination or hematuria  Integument:  Denies rash, lesion or ulceration   Neurologic:  Denies headache or focal weakness  Endocrine:  Denies lymphadenopathy  Psych:  Denies confusion or change in mental status   Hem:  Denies active bleeding    OBJECTIVE:  Physical Exam: 78 y.o. female  Wt Readings from Last 3 Encounters:   06/11/24 109 kg (240 lb)   01/04/24 109 kg (239 lb 11.2 oz)   12/06/23 107 kg (235 lb)     Ht Readings from Last 1 Encounters:   06/11/24 165.1 cm (65\")     Body mass index is 39.94 kg/m².  Vitals:    06/11/24 0905   Temp: 97.5 °F (36.4 °C)     Vital signs reviewed.     General Appearance:    Alert, cooperative, in no acute distress                  Eyes: conjunctiva clear  ENT: external ears and nose atraumatic  CV: no peripheral edema  Resp: normal respiratory effort  Skin: no rashes or wounds; normal turgor  Psych: mood and affect appropriate  Lymph: no nodes appreciated  Neuro: gross " sensation intact  Vascular:  Palpable peripheral pulse in noted extremity  Musculoskeletal Extremities: skin warm, dry and intact with point tenderness to right greater trochanter bursa no pain with internal rotation patient ambulates without any assistive devices.  Right shoulder has pain in the AC joint and the posterior subacromial she has good arc of motion with assistance free range of motion of the elbow weakness to abduction and abduction.  Right knee excellent range of motion calves are soft and nontender there are some diffuse ecchymosis to medial aspect of her knee no signs of infection    Radiology:   Right knee 3 views done for pain with comparison films show well aligned well-placed total knee arthroplasty in good alignment  Right shoulder 3 views done for pain with no comparison films show no fracture with degenerative changes within the shoulder  Assessment:     ICD-10-CM ICD-9-CM   1. History of fall  Z91.81 V15.88   2. Status post total bilateral knee replacement  Z96.653 V43.65   3. Osteoarthritis of lumbar spine, unspecified spinal osteoarthritis complication status  M47.816 721.3   4. Primary osteoarthritis of right shoulder  M19.011 715.11   5. Greater trochanteric bursitis of right hip  M70.61 726.5   6. Contusion of right knee, initial encounter  S80.01XA 924.11        Large Joint Arthrocentesis: R subacromial bursa  Date/Time: 6/11/2024 1:26 PM  Consent given by: patient  Timeout: Immediately prior to procedure a time out was called to verify the correct patient, procedure, equipment, support staff and site/side marked as required   Supporting Documentation  Indications: pain   Procedure Details  Location: shoulder - R subacromial bursa  Preparation: Patient was prepped and draped in the usual sterile fashion  Needle gauge: 21.  Medications administered: 80 mg methylPREDNISolone acetate 80 MG/ML; 2 mL lidocaine PF 1% 1 %  Patient tolerance: patient tolerated the procedure well with no  immediate complications      Large Joint Arthrocentesis: R greater trochanteric bursa  Date/Time: 6/11/2024 1:28 PM  Consent given by: patient  Site marked: site marked  Timeout: Immediately prior to procedure a time out was called to verify the correct patient, procedure, equipment, support staff and site/side marked as required   Supporting Documentation  Indications: pain   Procedure Details  Location: hip - R greater trochanteric bursa  Preparation: Patient was prepped and draped in the usual sterile fashion  Needle gauge: 21 G.  Approach: lateral  Medications administered: 2 mL lidocaine (cardiac); 80 mg methylPREDNISolone acetate 80 MG/ML  Patient tolerance: patient tolerated the procedure well with no immediate complications        MDM/Plan:   The diagnosis(es), natural history, pathophysiology and treatment for diagnosis(es) were discussed. Opportunity given and questions answered.  Biomechanics of pertinent body areas discussed.  When appropriate, the use of ambulatory aids discussed.  BMI:  The concept of BMI body mass index and its importance and implications discussed.    EXERCISES:  Advice on benefits of, and types of regular/moderate exercise pertaining to orthopedic diagnosis(es).  MEDICATIONS:  The risks, benefits, warnings,side effects and alternatives of medications discussed.  Inflammation/pain control; with cold, heat, elevation and/or liniments discussed as appropriate  Cortisone Injection. See procedure note.  HOME EXERCISE/PT program encouraged  MEDICAL RECORDS reviewed from other provider(s) for past and current medical history pertinent to this complaint.    Medrol dose pack sent to pharmacy   6/11/2024    Much of this encounter note is an electronic transcription/translation of spoken language to printed text. The electronic translation of spoken language may permit erroneous, or at times, nonsensical words or phrases to be inadvertently transcribed; Although I have reviewed the note for  such errors, some may still exist

## 2024-06-12 RX ORDER — LIDOCAINE HYDROCHLORIDE 10 MG/ML
2 INJECTION, SOLUTION EPIDURAL; INFILTRATION; INTRACAUDAL; PERINEURAL
Status: COMPLETED | OUTPATIENT
Start: 2024-06-11 | End: 2024-06-11

## 2024-06-12 RX ORDER — METHYLPREDNISOLONE ACETATE 80 MG/ML
80 INJECTION, SUSPENSION INTRA-ARTICULAR; INTRALESIONAL; INTRAMUSCULAR; SOFT TISSUE
Status: COMPLETED | OUTPATIENT
Start: 2024-06-11 | End: 2024-06-11

## 2024-07-08 ENCOUNTER — TELEPHONE (OUTPATIENT)
Dept: CARDIOLOGY | Facility: CLINIC | Age: 79
End: 2024-07-08
Payer: MEDICARE

## 2024-07-08 RX ORDER — LOSARTAN POTASSIUM 50 MG/1
50 TABLET ORAL DAILY
Qty: 90 TABLET | Refills: 3 | OUTPATIENT
Start: 2024-07-08

## 2024-07-08 RX ORDER — OMEPRAZOLE 40 MG/1
40 CAPSULE, DELAYED RELEASE ORAL 2 TIMES DAILY
Qty: 180 CAPSULE | Refills: 3 | OUTPATIENT
Start: 2024-07-08

## 2024-07-08 RX ORDER — METOPROLOL SUCCINATE 25 MG/1
TABLET, EXTENDED RELEASE ORAL
Qty: 90 TABLET | Refills: 3 | OUTPATIENT
Start: 2024-07-08

## 2024-07-08 NOTE — TELEPHONE ENCOUNTER
Patient is calling because she has had 2 afib episodes this week. Last episode was last night and it lasted for 4.5 hours. Highest her HR got was 124. She is unsure what her BP was running at the time. When she has these episodes she gets weak and has to go to the restroom. Today her HR is in the 60s. She is unsure of her BP. She did say she is having increased stress [place] Below are her meds:    Losartan 50mg daily  Eliquis 5mg BID  Metoprolol 25mg daily    Damaris Parker RN  Triage [facility]

## 2024-07-10 DIAGNOSIS — I48.0 PAF (PAROXYSMAL ATRIAL FIBRILLATION): Primary | ICD-10-CM

## 2024-07-10 RX ORDER — PROPAFENONE HYDROCHLORIDE 150 MG/1
150 TABLET, COATED ORAL EVERY 8 HOURS
Qty: 90 TABLET | Refills: 3 | Status: SHIPPED | OUTPATIENT
Start: 2024-07-10

## 2024-07-11 NOTE — TELEPHONE ENCOUNTER
Patient is scheduled for EKG check tomorrow.     Dr. Lo, elvin and Kelly do not have any openings next week. Do you want me to add her on for Tuesday at 10AM to see you or do you want her to see another APRN?    Damaris Parker RN  [place] [facility]

## 2024-07-12 ENCOUNTER — CLINICAL SUPPORT (OUTPATIENT)
Dept: CARDIOLOGY | Facility: CLINIC | Age: 79
End: 2024-07-12
Payer: MEDICARE

## 2024-07-12 DIAGNOSIS — R00.2 PALPITATIONS: Primary | ICD-10-CM

## 2024-07-12 PROCEDURE — 93000 ELECTROCARDIOGRAM COMPLETE: CPT | Performed by: INTERNAL MEDICINE

## 2024-07-12 NOTE — PROGRESS NOTES
Procedure     ECG 12 Lead    Date/Time: 7/12/2024 10:21 AM  Performed by: Jason Lo MD    Authorized by: Jason Lo MD  Comparison: compared with previous ECG   Similar to previous ECG  Rhythm: sinus rhythm  Conduction: right bundle branch block and left anterior fascicular block    Clinical impression: abnormal EKG

## 2024-07-15 ENCOUNTER — OFFICE VISIT (OUTPATIENT)
Dept: ORTHOPEDIC SURGERY | Facility: CLINIC | Age: 79
End: 2024-07-15
Payer: MEDICARE

## 2024-07-15 VITALS — BODY MASS INDEX: 40.52 KG/M2 | TEMPERATURE: 98.6 F | WEIGHT: 243.2 LBS | HEIGHT: 65 IN

## 2024-07-15 DIAGNOSIS — M75.101 TEAR OF RIGHT ROTATOR CUFF, UNSPECIFIED TEAR EXTENT, UNSPECIFIED WHETHER TRAUMATIC: Primary | ICD-10-CM

## 2024-07-15 PROCEDURE — 1159F MED LIST DOCD IN RCRD: CPT | Performed by: ORTHOPAEDIC SURGERY

## 2024-07-15 PROCEDURE — 99213 OFFICE O/P EST LOW 20 MIN: CPT | Performed by: ORTHOPAEDIC SURGERY

## 2024-07-15 PROCEDURE — 1160F RVW MEDS BY RX/DR IN RCRD: CPT | Performed by: ORTHOPAEDIC SURGERY

## 2024-07-15 NOTE — PROGRESS NOTES
Patient:Jessica Ames    YOB: 1945    Medical Record Number:[id number]    Chief Complaints:  Right shoulder pain    History of Present Illness:     78 y.o. female patient who presents for her right shoulder.  She has a history of a rotator cuff repair in 2010.  She says that she did well after that surgery.  She was fine up until about a month ago when she fell.  Ever since then, she reports constant burning pain in the shoulder, night pain and difficulty sleeping as well as weakness and difficulty with overhead activities.  She saw Zoraida and had an injection.  She tells me the injection did not help.  She was referred to physical therapy.  She attended 1 session but could not tolerate any further PT.  She is really frustrated by her persistent symptoms.  She says they seem to be quite similar to those that she was having prior to her rotator cuff repair.    Allergies:  Allergies   Allergen Reactions    Adenosine Other (See Comments)     Paralyzed lungs and vocal chords    Lisinopril Cough    Other Unknown - High Severity and Unknown - Low Severity     ANTI-INFLAMMATORY      Celecoxib Palpitations     LE EDEMA    Naproxen Palpitations     LE EDEMA    ALL NSAIDS    Risedronate Swelling and Palpitations     LEG EDEMA    Sulfa Antibiotics Hives       Home Medications:    Current Outpatient Medications:     Acetaminophen (TYLENOL PO), Take  by mouth Daily As Needed., Disp: , Rfl:     Blood Glucose Monitoring Suppl device, 1 each Take As Directed., Disp: 1 each, Rfl: 0    Cholecalciferol 2000 units capsule, Take 2,000 Units by mouth Daily. One PO daily, Disp: 90 each, Rfl: 3    Cyanocobalamin (VITAMIN B-12) 1000 MCG sublingual tablet, Place 1,000 mcg under the tongue Daily. One SL daily (Patient taking differently: Place 1 tablet under the tongue Daily. TWICE A WEEK), Disp: 90 each, Rfl: 3    Eliquis 5 MG tablet tablet, TAKE 1 TABLET EVERY 12 HOURS, Disp: 180 tablet, Rfl: 3    ezetimibe (ZETIA) 10 MG  tablet, TAKE 1 TABLET BY MOUTH DAILY. FOR CHOLESTEROL, Disp: 90 tablet, Rfl: 3    glucose blood test strip, Use as instructed, Disp: 200 each, Rfl: 12    Lancets (freestyle) lancets, Check BS fasting and HS daily, Disp: 100 each, Rfl: 12    levothyroxine (SYNTHROID, LEVOTHROID) 75 MCG tablet, Take 1 tablet by mouth Daily. For thyroid before breakfast, Disp: 90 tablet, Rfl: 2    metoprolol succinate XL (TOPROL-XL) 25 MG 24 hr tablet, TAKE 1 TABLET ONE TIME DAILY FOR HEART, Disp: 90 tablet, Rfl: 3    omeprazole (priLOSEC) 40 MG capsule, Take 1 capsule by mouth 2 (Two) Times a Day., Disp: 180 capsule, Rfl: 3    propafenone (RYTHMOL) 150 MG tablet, Take 1 tablet by mouth Every 8 (Eight) Hours., Disp: 90 tablet, Rfl: 3    Semaglutide, 1 MG/DOSE, (OZEMPIC) 2 MG/1.5ML solution pen-injector, Inject 1 mg under the skin into the appropriate area as directed 1 (One) Time Per Week. For DMII, Disp: 9 mL, Rfl: 5    sertraline (ZOLOFT) 50 MG tablet, TAKE 1 TABLET EVERY DAY FOR STRESS, Disp: 90 tablet, Rfl: 3    losartan (COZAAR) 50 MG tablet, Take 1 tablet by mouth Daily. for blood pressure---make appt (Patient not taking: Reported on 6/11/2024), Disp: 90 tablet, Rfl: 0    Past Medical History:   Diagnosis Date    Abnormal ECG 2005    Allergic 1963    Arthritis     Arthritis of back 2000    Degenerative arthritis in lower back    Arthritis of neck     Bleeding disorder 2013    [place] factor 5    Bursitis of hip March 2022    Cataract 2015    Cholelithiasis Oct. 2022    Clotting disorder 2013    Coronary artery disease 2015    Depression 2021    Diabetes mellitus 2022    Disease of thyroid gland     DVT (deep venous thrombosis) 2013    right leg; s/p knee replacement; was on xarelto and now baby aspirin; followed by Dr. Parminder Mejia      Essential (primary) hypertension 08/17/2016    Factor V Leiden     Fibrocystic breast 1986    First degree AV block 12/07/2012    GERD (gastroesophageal reflux disease) 12/07/2012    Hematuria,  "microscopic     Negative urological workup with Dr. Concepcion 6/3/2024    Hiatal hernia     Hip arthrosis     History of colon polyps     \"9 REMOVED\"    History of medical problems Polyps removed from stomach        Hypercholesterolemia     unknown    Hyperlipidemia     Hyperlipidemia 2016    Hypertension     Hypothyroidism 1982    Hypothyroidism, unspecified 2016    IFG (impaired fasting glucose)     Iron deficiency anemia     sees Dr. Clem Bowman     Knee swelling 2000    Knees replaced ‘13 & ‘14    Left anterior fascicular block 2012    Low back strain     Lower extremity edema     Obesity 2012    ERIC (obstructive sleep apnea)     compliant with CPAP machine     Osteoarthrosis 2016    leg    Osteopenia     PAF (paroxysmal atrial fibrillation) 2015    day after colonoscopy went into atrial fibrillation; was on Xarelto and now baby aspirin     Periarthritis of shoulder     Spurs removed in both shoulders    Phlebitis     unknown    Postphlebitic syndrome without complication 2016    Right Lower Extremity    Pulmonary embolism 2013    also had DVT     PVC's (premature ventricular contractions) 2012    Rectal bleeding     Rotator cuff syndrome Both shoulders torn    Surgery on both    Sleep apnea     unknown    SSS (sick sinus syndrome)     Thyroid disease     unknown    Venous insufficiency     followed by Dr. Parminder Mejai        Past Surgical History:   Procedure Laterality Date    BREAST SURGERY      reduction    CARDIAC CATHETERIZATION  3/2013    CATH LAB PROCEDURE       SECTION      CHOLECYSTECTOMY WITH INTRAOPERATIVE CHOLANGIOGRAM N/A 10/28/2022    Procedure: CHOLECYSTECTOMY LAPAROSCOPIC INTRAOPERATIVE CHOLANGIOGRAM;  Surgeon: Melecio Gan MD;  Location: [facility];  Service: General;  Laterality: N/A;    COLONOSCOPY N/A     dr. torres    COLONOSCOPY  2017    EYE SURGERY      HAND SURGERY      joint removal    " "HEMORRHOIDECTOMY N/A 1986    HYSTERECTOMY  1984    JOINT REPLACEMENT  Both knees    2013 & 2014    KNEE SURGERY  01/2013    LUMBAR EPIDURAL INJECTION N/A 10/17/2023    Pt had A Fib episode later that day    REDUCTION MAMMAPLASTY  1986    ROTATOR CUFF REPAIR      SHOULDER SURGERY Bilateral 1996    TOTAL KNEE ARTHROPLASTY      UPPER GASTROINTESTINAL ENDOSCOPY  2018    13 polyps removed from stomach.. dr torres    UPPER GASTROINTESTINAL ENDOSCOPY  11/30/2022           Social History     Occupational History    Not on file   Tobacco Use    Smoking status: Never     Passive exposure: Never    Smokeless tobacco: Never    Tobacco comments:     minimal caffeine; Patient does not smoke.   Vaping Use    Vaping status: Never Used   Substance and Sexual Activity    Alcohol use: Yes     Comment: \" once month\"; Patient is non drinker    Drug use: Never     Comment: Drug Abuse: none    Sexual activity: Yes     Partners: Male     Comment: NA      Social History     Social History Narrative    Not on file       Family History   Problem Relation Age of Onset    Stroke Mother     Diabetes Mother     Hypertension Mother     Uterine cancer Mother     Arthritis Mother     Cancer Mother     Miscarriages / Stillbirths Mother     Osteoporosis Mother     Hypertension Father     Heart attack Father     Emphysema Father     Alcohol abuse Father     COPD Father     Diabetes Father     Heart disease Father     Diabetes Sister     Hypertension Sister     Hearing loss Sister     Thyroid disease Sister     Stroke Brother     Diabetes Brother     Hypertension Brother     Mental illness Maternal Grandmother     Diabetes Other     Malig Hyperthermia Neg Hx        Review of Systems:      Constitutional: Denies fever, shaking or chills   Eyes: Denies change in visual acuity   HEENT: Denies nasal congestion or sore throat   Respiratory: Denies cough or shortness of breath   Cardiovascular: Denies chest pain or edema  Endocrine: Denies tremors, " "palpitations, intolerance of heat or cold, polyuria, polydipsia.  GI: Denies abdominal pain, nausea, vomiting, bloody stools or diarrhea  : Denies frequency, urgency, incontinence, retention, or nocturia.  Musculoskeletal: Denies numbness, tingling or loss of motor function except as above  Integument: Denies rash, lesion or ulceration   Neurologic: Denies headache or focal weakness, deficits  Heme: Denies spontaneous or excessive bleeding, epistaxis, hematuria, melena, fatigue, enlarged or tender lymph nodes.      All other pertinent positives and negatives as noted above in HPI.    Physical Exam:78 y.o. female  Vitals:    07/15/24 1401   Temp: 98.6 °F (37 °C)   TempSrc: Temporal   Weight: 110 kg (243 lb 3.2 oz)   Height: 165.1 cm (65\")     General:  Patient is awake and alert.  Appears in no acute distress or discomfort.    Psych:  Affect and demeanor are appropriate.    Extremities:  Right upper extremity:  Skin is benign.  No gross abnormalities on inspection including any atrophy, swellings, or masses.  No palpable masses or adenopathy.  She has mild to moderate tenderness over the rotator interval but there is no effusion effusion or increased warmth.  Full shoulder motion.  No evident instability or apprehension.  Mildly positive Neer, Pham maneuvers.  Weakness with forward elevation in the scapular plane and abduction.   Good strength with internal and external rotation although external rotation is uncomfortable for her.  Good strength in the deltoid, biceps, triceps, and .  Intact sensation throughout the arm.  Brisk cap refill.  Palpable radial pulse.  Good skin turgor.     Imaging:  Her previous AP, scapular Y, and axillary views of the right shoulder reviewed to evaluate her complaint.  No comparison films are immediately available.  The x-rays show no obvious acute abnormalities, lesions, masses, significant degenerative changes, or other concerning findings.  The acromiohumeral interval is " normal.    Assessment/Plan: Right shoulder pain, suspected recurrent rotator cuff tear    I told her I think she probably has a tear.  We discussed her options at this point.  She wants to pursue further workup.  I have agreed to refer her for an MRI.  I told her I will call her once I see the results.    Alvarez Oden MD    07/15/2024

## 2024-07-16 ENCOUNTER — OFFICE VISIT (OUTPATIENT)
Dept: CARDIOLOGY | Facility: CLINIC | Age: 79
End: 2024-07-16
Payer: MEDICARE

## 2024-07-16 VITALS
BODY MASS INDEX: 40.22 KG/M2 | WEIGHT: 241.4 LBS | SYSTOLIC BLOOD PRESSURE: 152 MMHG | DIASTOLIC BLOOD PRESSURE: 88 MMHG | HEART RATE: 52 BPM | HEIGHT: 65 IN

## 2024-07-16 DIAGNOSIS — I45.10 RIGHT BUNDLE BRANCH BLOCK (RBBB): ICD-10-CM

## 2024-07-16 DIAGNOSIS — D68.51 FACTOR 5 LEIDEN MUTATION, HETEROZYGOUS: ICD-10-CM

## 2024-07-16 DIAGNOSIS — I10 PRIMARY HYPERTENSION: Primary | ICD-10-CM

## 2024-07-16 DIAGNOSIS — E66.01 CLASS 3 SEVERE OBESITY DUE TO EXCESS CALORIES WITH BODY MASS INDEX (BMI) OF 40.0 TO 44.9 IN ADULT, UNSPECIFIED WHETHER SERIOUS COMORBIDITY PRESENT: ICD-10-CM

## 2024-07-16 DIAGNOSIS — Z86.711 HISTORY OF PULMONARY EMBOLISM: ICD-10-CM

## 2024-07-16 DIAGNOSIS — I48.0 PAROXYSMAL ATRIAL FIBRILLATION: ICD-10-CM

## 2024-07-16 PROCEDURE — 3077F SYST BP >= 140 MM HG: CPT | Performed by: INTERNAL MEDICINE

## 2024-07-16 PROCEDURE — 3079F DIAST BP 80-89 MM HG: CPT | Performed by: INTERNAL MEDICINE

## 2024-07-16 PROCEDURE — 99214 OFFICE O/P EST MOD 30 MIN: CPT | Performed by: INTERNAL MEDICINE

## 2024-07-16 PROCEDURE — 93000 ELECTROCARDIOGRAM COMPLETE: CPT | Performed by: INTERNAL MEDICINE

## 2024-07-16 RX ORDER — PROPAFENONE HYDROCHLORIDE 150 MG/1
150 TABLET, COATED ORAL 2 TIMES DAILY
Status: SHIPPED
Start: 2024-07-16

## 2024-07-16 NOTE — PROGRESS NOTES
"Date of Office Visit: 24  Encounter Provider: Jason Lo MD  Place of Service: [facility] CARDIOLOGY  Patient Name: Jessica Ames  :1945  [id number]    Chief Complaint   Patient presents with    Atrial Fibrillation   :     HPI: Jessica Ames is a 78 y.o. female who has had a history of paroxysmal A-fib.  Hypertension and she is prediabetic.  She is morbidly obese and had DVT and a pulmonary embolus after knee replacement.  She has factor V Leiden.  In  we decided to put her on Eliquis because she was having some paroxysmal A-fib.  We recently saw her last week because she was having symptomatic paroxysmal A-fib and we put her on Rythmol and she is here for follow-up today.    She is doing okay but it interesting right now she is having some indigestion.  She has had a history of.  She has not had any A-fib her breathing is fine no PND no orthopnea no edema blood pressures have been better than what were getting here at home      Past Medical History:   Diagnosis Date    Abnormal ECG     Allergic 1963    Arthritis     Arthritis of back     Degenerative arthritis in lower back    Arthritis of neck     Bleeding disorder     [place] factor 5    Bursitis of hip 2022    Cataract     Cholelithiasis Oct. 2022    Clotting disorder     Coronary artery disease     Depression     Diabetes mellitus     Disease of thyroid gland     DVT (deep venous thrombosis)     right leg; s/p knee replacement; was on xarelto and now baby aspirin; followed by Dr. Parminder Mejia      Essential (primary) hypertension 2016    Factor V Leiden     Fibrocystic breast 1986    First degree AV block 2012    GERD (gastroesophageal reflux disease) 2012    Hematuria, microscopic     Negative urological workup with Dr. Concepcion 6/3/2024    Hiatal hernia     Hip arthrosis     History of colon polyps     \"9 REMOVED\"    History of medical problems Polyps " removed from stomach        Hypercholesterolemia     unknown    Hyperlipidemia     Hyperlipidemia 2016    Hypertension     Hypothyroidism 1982    Hypothyroidism, unspecified 2016    IFG (impaired fasting glucose)     Iron deficiency anemia     sees Dr. Clem Bowman     Knee swelling 2000    Knees replaced ‘13 & ‘14    Left anterior fascicular block 2012    Low back strain 2000    Lower extremity edema     Obesity 2012    ERIC (obstructive sleep apnea)     compliant with CPAP machine     Osteoarthrosis 2016    leg    Osteopenia     PAF (paroxysmal atrial fibrillation) 2015    day after colonoscopy went into atrial fibrillation; was on Xarelto and now baby aspirin     Periarthritis of shoulder     Spurs removed in both shoulders    Phlebitis     unknown    Postphlebitic syndrome without complication 2016    Right Lower Extremity    Pulmonary embolism     also had DVT     PVC's (premature ventricular contractions) 2012    Rectal bleeding     Rotator cuff syndrome Both shoulders torn    Surgery on both    Sleep apnea     unknown    SSS (sick sinus syndrome)     Thyroid disease     unknown    Venous insufficiency     followed by Dr. Parminder Mejia        Past Surgical History:   Procedure Laterality Date    BREAST SURGERY      reduction    CARDIAC CATHETERIZATION  3/2013    CATH LAB PROCEDURE       SECTION      CHOLECYSTECTOMY WITH INTRAOPERATIVE CHOLANGIOGRAM N/A 10/28/2022    Procedure: CHOLECYSTECTOMY LAPAROSCOPIC INTRAOPERATIVE CHOLANGIOGRAM;  Surgeon: Melecio Gan MD;  Location: [facility];  Service: General;  Laterality: N/A;    COLONOSCOPY N/A     dr. torres    COLONOSCOPY      EYE SURGERY      HAND SURGERY      joint removal    HEMORRHOIDECTOMY N/A     HYSTERECTOMY  1984    JOINT REPLACEMENT  Both knees     &     KNEE SURGERY  2013    LUMBAR EPIDURAL INJECTION N/A 10/17/2023    Pt had A Fib episode  "later that day    REDUCTION MAMMAPLASTY  1986    ROTATOR CUFF REPAIR      SHOULDER SURGERY Bilateral 1996    TOTAL KNEE ARTHROPLASTY      UPPER GASTROINTESTINAL ENDOSCOPY  2018    13 polyps removed from stomach.. dr torres    UPPER GASTROINTESTINAL ENDOSCOPY  11/30/2022           Social History     Socioeconomic History    Marital status:    Tobacco Use    Smoking status: Never     Passive exposure: Never    Smokeless tobacco: Never    Tobacco comments:     minimal caffeine; Patient does not smoke.   Vaping Use    Vaping status: Never Used   Substance and Sexual Activity    Alcohol use: Yes     Comment: \" once month\"; Patient is non drinker    Drug use: Never     Comment: Drug Abuse: none    Sexual activity: Yes     Partners: Male     Comment: NA       Family History   Problem Relation Age of Onset    Stroke Mother     Diabetes Mother     Hypertension Mother     Uterine cancer Mother     Arthritis Mother     Cancer Mother     Miscarriages / Stillbirths Mother     Osteoporosis Mother     Hypertension Father     Heart attack Father     Emphysema Father     Alcohol abuse Father     COPD Father     Diabetes Father     Heart disease Father     Diabetes Sister     Hypertension Sister     Hearing loss Sister     Thyroid disease Sister     Stroke Brother     Diabetes Brother     Hypertension Brother     Mental illness Maternal Grandmother     Diabetes Other     Malig Hyperthermia Neg Hx        Review of Systems   Constitutional: Negative for decreased appetite, fever, malaise/fatigue and weight loss.   HENT:  Negative for nosebleeds.    Eyes:  Negative for double vision.   Cardiovascular:  Negative for chest pain, claudication, cyanosis, dyspnea on exertion, irregular heartbeat, leg swelling, near-syncope, orthopnea, palpitations, paroxysmal nocturnal dyspnea and syncope.   Respiratory:  Negative for cough, hemoptysis and shortness of breath.    Hematologic/Lymphatic: Negative for bleeding problem.   Skin: "  Negative for rash.   Musculoskeletal:  Negative for falls and myalgias.   Gastrointestinal:  Negative for hematochezia, jaundice, melena, nausea and vomiting.   Genitourinary:  Negative for hematuria.   Neurological:  Negative for dizziness and seizures.   Psychiatric/Behavioral:  Negative for altered mental status and memory loss.        Allergies   Allergen Reactions    Adenosine Other (See Comments)     Paralyzed lungs and vocal chords    Lisinopril Cough    Other Unknown - High Severity and Unknown - Low Severity     ANTI-INFLAMMATORY      Celecoxib Palpitations     LE EDEMA    Naproxen Palpitations     LE EDEMA    ALL NSAIDS    Risedronate Swelling and Palpitations     LEG EDEMA    Sulfa Antibiotics Hives         Current Outpatient Medications:     Acetaminophen (TYLENOL PO), Take  by mouth Daily As Needed., Disp: , Rfl:     Blood Glucose Monitoring Suppl device, 1 each Take As Directed., Disp: 1 each, Rfl: 0    Cholecalciferol 2000 units capsule, Take 2,000 Units by mouth Daily. One PO daily, Disp: 90 each, Rfl: 3    Cyanocobalamin (VITAMIN B-12) 1000 MCG sublingual tablet, Place 1,000 mcg under the tongue Daily. One SL daily (Patient taking differently: Place 1 tablet under the tongue Daily. TWICE A WEEK), Disp: 90 each, Rfl: 3    Eliquis 5 MG tablet tablet, TAKE 1 TABLET EVERY 12 HOURS, Disp: 180 tablet, Rfl: 3    ezetimibe (ZETIA) 10 MG tablet, TAKE 1 TABLET BY MOUTH DAILY. FOR CHOLESTEROL, Disp: 90 tablet, Rfl: 3    glucose blood test strip, Use as instructed, Disp: 200 each, Rfl: 12    Lancets (freestyle) lancets, Check BS fasting and HS daily, Disp: 100 each, Rfl: 12    levothyroxine (SYNTHROID, LEVOTHROID) 75 MCG tablet, Take 1 tablet by mouth Daily. For thyroid before breakfast, Disp: 90 tablet, Rfl: 2    losartan (COZAAR) 50 MG tablet, Take 1 tablet by mouth Daily. for blood pressure---make appt, Disp: 90 tablet, Rfl: 0    metoprolol succinate XL (TOPROL-XL) 25 MG 24 hr tablet, TAKE 1 TABLET ONE TIME  "DAILY FOR HEART, Disp: 90 tablet, Rfl: 3    omeprazole (priLOSEC) 40 MG capsule, Take 1 capsule by mouth 2 (Two) Times a Day., Disp: 180 capsule, Rfl: 3    propafenone (RYTHMOL) 150 MG tablet, Take 1 tablet by mouth Every 8 (Eight) Hours. (Patient taking differently: Take 1 tablet by mouth 2 (Two) Times a Day.), Disp: 90 tablet, Rfl: 3    Semaglutide, 1 MG/DOSE, (OZEMPIC) 2 MG/1.5ML solution pen-injector, Inject 1 mg under the skin into the appropriate area as directed 1 (One) Time Per Week. For DMII, Disp: 9 mL, Rfl: 5    sertraline (ZOLOFT) 50 MG tablet, TAKE 1 TABLET EVERY DAY FOR STRESS, Disp: 90 tablet, Rfl: 3      Objective:     Vitals:    07/16/24 0947   BP: 152/88   Pulse: 52   Weight: 109 kg (241 lb 6.4 oz)   Height: 165.1 cm (65\")     Body mass index is 40.17 kg/m².    Constitutional:       Appearance: Well-developed.   Eyes:      General: No scleral icterus.  HENT:      Head: Normocephalic.   Neck:      Thyroid: No thyromegaly.      Vascular: No JVD.      Lymphadenopathy: No cervical adenopathy.   Pulmonary:      Effort: Pulmonary effort is normal.      Breath sounds: Normal breath sounds. No wheezing. No rales.   Cardiovascular:      Normal rate. Regular rhythm.      No gallop.    Edema:     Peripheral edema absent.   Abdominal:      Palpations: Abdomen is soft.      Tenderness: There is no abdominal tenderness.   Musculoskeletal: Normal range of motion. Skin:     General: Skin is warm and dry.      Findings: No rash.   Neurological:      Mental Status: Alert and oriented to person, place, and time.           ECG 12 Lead    Date/Time: 7/16/2024 10:10 AM  Performed by: Jason Lo MD    Authorized by: Jason Lo MD  Comparison: compared with previous ECG   Similar to previous ECG  Rhythm: sinus bradycardia  Conduction: right bundle branch block and left anterior fascicular block    Clinical impression: abnormal EKG           Assessment:       Diagnosis Plan   1. Primary hypertension        2. " Paroxysmal atrial fibrillation        3. Right bundle branch block (RBBB)        4. History of pulmonary embolism        5. Factor 5 Leiden mutation, heterozygous        6. Class 3 severe obesity due to excess calories with body mass index (BMI) of 40.0 to 44.9 in adult, unspecified whether serious comorbidity present                 Plan:       I think she is doing okay this seems to be GI her symptoms that she is having right now am going to have her stay on the Rythmol twice a day and we will see how she does we may not need to stay on it forever she just seems like she is a little stirred up here recently and if we can come off of it in the future we may try to go with more of a pill in the pocket approach.    No follow-ups on file.     As always, it has been a pleasure to participate in your patient's care.      Sincerely,       Jason Lo MD

## 2024-07-18 RX ORDER — EZETIMIBE 10 MG/1
10 TABLET ORAL DAILY
Qty: 90 TABLET | Refills: 3 | Status: SHIPPED | OUTPATIENT
Start: 2024-07-18

## 2024-07-22 ENCOUNTER — HOSPITAL ENCOUNTER (OUTPATIENT)
Dept: MRI IMAGING | Facility: HOSPITAL | Age: 79
Discharge: HOME | End: 2024-07-22
Admitting: ORTHOPAEDIC SURGERY
Payer: MEDICARE

## 2024-07-22 DIAGNOSIS — M75.101 TEAR OF RIGHT ROTATOR CUFF, UNSPECIFIED TEAR EXTENT, UNSPECIFIED WHETHER TRAUMATIC: ICD-10-CM

## 2024-07-22 PROCEDURE — 73221 MRI JOINT UPR EXTREM W/O DYE: CPT

## 2024-07-26 ENCOUNTER — TELEPHONE (OUTPATIENT)
Dept: ORTHOPEDIC SURGERY | Facility: CLINIC | Age: 79
End: 2024-07-26
Payer: MEDICARE

## 2024-07-26 NOTE — TELEPHONE ENCOUNTER
SPOKE WITH PATIENT AND GOT HER SCHEDULED [facility] LOCATION 09-13-24 AT 11:20 AM.

## 2024-07-26 NOTE — TELEPHONE ENCOUNTER
I spoke to Ms. Ames.  I provided her with the right shoulder MRI results as reviewed by Dr. Oden.  She has extensive rotator cuff tendinopathy and thinning of the rotator cuff.  Also, she appears to have advanced arthritis. From a surgical standpoint, Dr. Oden recommends she consider a shoulder replacement, but there is no rush to do this.  We discussed a number of conservative treatments.  For now, she would like to continue conservative treatments.  She is willing to see me for a repeat injection.  She understands there is a waiting period of 3 months between injections.  I will have a staff member call her to schedule the appointment.

## 2024-07-31 DIAGNOSIS — E03.9 HYPOTHYROIDISM, ACQUIRED: Chronic | ICD-10-CM

## 2024-08-01 RX ORDER — LEVOTHYROXINE SODIUM 0.07 MG/1
TABLET ORAL
Qty: 90 TABLET | Refills: 0 | Status: SHIPPED | OUTPATIENT
Start: 2024-08-01

## 2024-08-21 ENCOUNTER — TELEPHONE (OUTPATIENT)
Dept: GASTROENTEROLOGY | Facility: CLINIC | Age: 79
End: 2024-08-21
Payer: MEDICARE

## 2024-08-21 NOTE — TELEPHONE ENCOUNTER
Ok for the hub to relay and schedule.  Left vm for the pt to call back and reschedule with Teodora Palomino.  She was scheduled with Mita by mistake.

## 2024-09-11 ENCOUNTER — OFFICE VISIT (OUTPATIENT)
Dept: GASTROENTEROLOGY | Facility: CLINIC | Age: 79
End: 2024-09-11
Payer: MEDICARE

## 2024-09-11 VITALS
HEIGHT: 65 IN | HEART RATE: 52 BPM | TEMPERATURE: 97.4 F | BODY MASS INDEX: 41.32 KG/M2 | DIASTOLIC BLOOD PRESSURE: 72 MMHG | WEIGHT: 248 LBS | SYSTOLIC BLOOD PRESSURE: 114 MMHG

## 2024-09-11 DIAGNOSIS — K21.9 GASTROESOPHAGEAL REFLUX DISEASE, UNSPECIFIED WHETHER ESOPHAGITIS PRESENT: Primary | ICD-10-CM

## 2024-09-11 RX ORDER — PANTOPRAZOLE SODIUM 40 MG/1
40 TABLET, DELAYED RELEASE ORAL 2 TIMES DAILY
Qty: 180 TABLET | Refills: 3 | Status: SHIPPED | OUTPATIENT
Start: 2024-09-11

## 2024-09-11 NOTE — PROGRESS NOTES
"Chief Complaint  Heartburn and Difficulty Swallowing    Subjective          History Of Present Illness:    Jessica Ames is a  79 y.o. female patient of Dr. Cannon who presents as a follow-up for heartburn and dysphagia.    Patient reports she has been experiencing worsening heartburn at night for a few months. Patient does report intermittent episodes of chest pain. She did recently see Dr. Lo and her heart looks good right now. Patient report when she drinks water she feels like it hangs out in her throat. She denies any solid food dysphagia. Patient is ozempic therapy. Stable dosing for the last year. Currently on omeprazole 40 mg twice daily. She has been on omeprazole therapy for >10 years. She has had some weight gain. Appetite is unchanged. She tries to eat 3-4 hours before bedtime. She does avoid spicy foods and caffeine. Sleeps with the HOB elevated.     EGD 11/30/2022 - 5 cm hiatal hernia, normal stomach and duodenum, no endoscopic abnormality to explain patient's dysphagia.  Benign pathology.  C-scope 2015 normal.     Objective   Vital Signs:   /72   Pulse 52   Temp 97.4 °F (36.3 °C)   Ht 165.1 cm (65\")   Wt 112 kg (248 lb)   BMI 41.27 kg/m²       Physical Exam  Vitals reviewed.   Constitutional:       General: She is not in acute distress.     Appearance: Normal appearance. She is not ill-appearing.   HENT:      Head: Normocephalic and atraumatic.      Nose: Nose normal.      Mouth/Throat:      Pharynx: Oropharynx is clear.   Eyes:      Conjunctiva/sclera: Conjunctivae normal.   Pulmonary:      Effort: Pulmonary effort is normal.   Abdominal:      General: There is no distension.      Palpations: Abdomen is soft. There is no mass.      Tenderness: There is no abdominal tenderness.   Musculoskeletal:         General: No swelling. Normal range of motion.      Cervical back: Normal range of motion.   Skin:     General: Skin is warm and dry.      Findings: No bruising or rash.   Neurological: "      General: No focal deficit present.      Mental Status: She is alert and oriented to person, place, and time.      Motor: No weakness.      Gait: Gait normal.   Psychiatric:         Mood and Affect: Mood normal.          Result Review :   The following data was reviewed by: Teodora Sparrow PA-C on 09/11/2024:  CMP          2/8/2024    11:03   CMP   Glucose 91    BUN 19    Creatinine 0.93    Sodium 140    Potassium 4.5    Chloride 104    Calcium 9.2    Total Protein 6.2    Albumin 4.2    Globulin 2.0    Total Bilirubin 0.6    Alkaline Phosphatase 82    AST (SGOT) 11    ALT (SGPT) 10    BUN/Creatinine Ratio 20.4              Assessment and Plan    Diagnoses and all orders for this visit:    1. Gastroesophageal reflux disease, unspecified whether esophagitis present (Primary)  -     pantoprazole (PROTONIX) 40 MG EC tablet; Take 1 tablet by mouth 2 (Two) Times a Day.  Dispense: 180 tablet; Refill: 3  -     FL ESOPHAGRAM DOUBLE CONTRAST; Future       Stop omeprazole  Start pantoprazole 40 mg twice daily  Obtain esophagram for further evaluation of symptoms - she may have a component of dysmotility with her dysphagia with liquids.   Antireflux measures and dietary modifications reviewed. Low acid diet reviewed. Keep head of bed elevated. Stop eating/drinking at least 3 hours prior to bedtime. Eliminate caffeine and carbonated beverages.  Weight loss encouraged if BMI over 25.    Follow Up   Return in about 3 months (around 12/11/2024) for Teodora Palomino PA-C.    Dragon dictation used throughout this note.            Teodora Palomino PA-C   [facility]  [address] [place] [address]  Office: [phone]

## 2024-09-13 ENCOUNTER — OFFICE VISIT (OUTPATIENT)
Dept: ORTHOPEDIC SURGERY | Facility: CLINIC | Age: 79
End: 2024-09-13
Payer: MEDICARE

## 2024-09-13 VITALS — BODY MASS INDEX: 41.82 KG/M2 | HEIGHT: 65 IN | TEMPERATURE: 97.5 F | WEIGHT: 251 LBS

## 2024-09-13 DIAGNOSIS — E11.9 TYPE 2 DIABETES MELLITUS WITHOUT COMPLICATION, WITHOUT LONG-TERM CURRENT USE OF INSULIN: Chronic | ICD-10-CM

## 2024-09-13 DIAGNOSIS — I10 PRIMARY HYPERTENSION: Primary | Chronic | ICD-10-CM

## 2024-09-13 DIAGNOSIS — E78.2 MIXED HYPERLIPIDEMIA: Chronic | ICD-10-CM

## 2024-09-13 DIAGNOSIS — I87.2 VENOUS INSUFFICIENCY: Chronic | ICD-10-CM

## 2024-09-13 DIAGNOSIS — M19.019 SHOULDER ARTHRITIS: ICD-10-CM

## 2024-09-13 DIAGNOSIS — G89.29 CHRONIC RIGHT SHOULDER PAIN: Primary | ICD-10-CM

## 2024-09-13 DIAGNOSIS — I48.0 PAROXYSMAL ATRIAL FIBRILLATION: ICD-10-CM

## 2024-09-13 DIAGNOSIS — K21.9 GASTROESOPHAGEAL REFLUX DISEASE WITHOUT ESOPHAGITIS: Chronic | ICD-10-CM

## 2024-09-13 DIAGNOSIS — E55.9 VITAMIN D DEFICIENCY: Chronic | ICD-10-CM

## 2024-09-13 DIAGNOSIS — M25.511 CHRONIC RIGHT SHOULDER PAIN: Primary | ICD-10-CM

## 2024-09-13 RX ORDER — LIDOCAINE HYDROCHLORIDE 10 MG/ML
2 INJECTION, SOLUTION EPIDURAL; INFILTRATION; INTRACAUDAL; PERINEURAL
Status: COMPLETED | OUTPATIENT
Start: 2024-09-13 | End: 2024-09-13

## 2024-09-13 RX ORDER — METHYLPREDNISOLONE ACETATE 80 MG/ML
80 INJECTION, SUSPENSION INTRA-ARTICULAR; INTRALESIONAL; INTRAMUSCULAR; SOFT TISSUE
Status: COMPLETED | OUTPATIENT
Start: 2024-09-13 | End: 2024-09-13

## 2024-09-13 RX ADMIN — LIDOCAINE HYDROCHLORIDE 2 ML: 10 INJECTION, SOLUTION EPIDURAL; INFILTRATION; INTRACAUDAL; PERINEURAL at 11:20

## 2024-09-13 RX ADMIN — METHYLPREDNISOLONE ACETATE 80 MG: 80 INJECTION, SUSPENSION INTRA-ARTICULAR; INTRALESIONAL; INTRAMUSCULAR; SOFT TISSUE at 11:20

## 2024-09-13 NOTE — PROGRESS NOTES
Ms. Ames comes in today for follow-up.  Injections have worked well in the past.  The patient would like to get a repeat injection today.  The risks, benefits and alternatives were discussed, particularly elevated risk with respect to her diabetes.  Going forward, the patient will follow-up as needed.    Tiarra Desir, MIRA    09/13/2024      Large Joint Arthrocentesis: R glenohumeral  Date/Time: 9/13/2024 11:20 AM  Consent given by: patient  Site marked: site marked  Timeout: Immediately prior to procedure a time out was called to verify the correct patient, procedure, equipment, support staff and site/side marked as required   Supporting Documentation  Indications: pain   Procedure Details  Location: shoulder - R glenohumeral  Preparation: Patient was prepped and draped in the usual sterile fashion  Needle gauge: 21G.  Approach: posterior  Medications administered: 80 mg methylPREDNISolone acetate 80 MG/ML; 2 mL lidocaine PF 1% 1 %  Patient tolerance: patient tolerated the procedure well with no immediate complications

## 2024-09-19 ENCOUNTER — OFFICE VISIT (OUTPATIENT)
Dept: CARDIOLOGY | Facility: CLINIC | Age: 79
End: 2024-09-19
Payer: MEDICARE

## 2024-09-19 VITALS
HEIGHT: 65 IN | DIASTOLIC BLOOD PRESSURE: 78 MMHG | HEART RATE: 49 BPM | BODY MASS INDEX: 41.02 KG/M2 | SYSTOLIC BLOOD PRESSURE: 156 MMHG | WEIGHT: 246.2 LBS

## 2024-09-19 DIAGNOSIS — I48.0 PAROXYSMAL ATRIAL FIBRILLATION: Primary | ICD-10-CM

## 2024-09-19 DIAGNOSIS — I10 PRIMARY HYPERTENSION: ICD-10-CM

## 2024-09-19 PROCEDURE — 93000 ELECTROCARDIOGRAM COMPLETE: CPT | Performed by: NURSE PRACTITIONER

## 2024-09-19 PROCEDURE — 3077F SYST BP >= 140 MM HG: CPT | Performed by: NURSE PRACTITIONER

## 2024-09-19 PROCEDURE — 1159F MED LIST DOCD IN RCRD: CPT | Performed by: NURSE PRACTITIONER

## 2024-09-19 PROCEDURE — 3078F DIAST BP <80 MM HG: CPT | Performed by: NURSE PRACTITIONER

## 2024-09-19 PROCEDURE — 1160F RVW MEDS BY RX/DR IN RCRD: CPT | Performed by: NURSE PRACTITIONER

## 2024-09-19 PROCEDURE — 99214 OFFICE O/P EST MOD 30 MIN: CPT | Performed by: NURSE PRACTITIONER

## 2024-09-24 ENCOUNTER — HOSPITAL ENCOUNTER (OUTPATIENT)
Dept: GENERAL RADIOLOGY | Facility: HOSPITAL | Age: 79
Discharge: HOME | End: 2024-09-24
Admitting: PHYSICIAN ASSISTANT
Payer: MEDICARE

## 2024-09-24 DIAGNOSIS — K21.9 GASTROESOPHAGEAL REFLUX DISEASE, UNSPECIFIED WHETHER ESOPHAGITIS PRESENT: ICD-10-CM

## 2024-09-24 LAB
25(OH)D3+25(OH)D2 SERPL-MCNC: 50 NG/ML (ref 30–100)
ALBUMIN SERPL-MCNC: 4.2 G/DL (ref 3.5–5.2)
ALBUMIN/GLOB SERPL: 2.8 G/DL
ALP SERPL-CCNC: 88 U/L (ref 39–117)
ALT SERPL-CCNC: 11 U/L (ref 1–33)
APPEARANCE UR: CLEAR
AST SERPL-CCNC: 17 U/L (ref 1–32)
BACTERIA #/AREA URNS HPF: ABNORMAL /HPF
BASOPHILS # BLD AUTO: 0.01 10*3/MM3 (ref 0–0.2)
BASOPHILS NFR BLD AUTO: 0.2 % (ref 0–1.5)
BILIRUB SERPL-MCNC: 0.5 MG/DL (ref 0–1.2)
BILIRUB UR QL STRIP: NEGATIVE
BUN SERPL-MCNC: 16 MG/DL (ref 8–23)
BUN/CREAT SERPL: 18.2 (ref 7–25)
CALCIUM SERPL-MCNC: 9.4 MG/DL (ref 8.6–10.5)
CASTS URNS MICRO: ABNORMAL
CHLORIDE SERPL-SCNC: 105 MMOL/L (ref 98–107)
CHOLEST SERPL-MCNC: 188 MG/DL (ref 0–200)
CO2 SERPL-SCNC: 27.7 MMOL/L (ref 22–29)
COLOR UR: YELLOW
CREAT SERPL-MCNC: 0.88 MG/DL (ref 0.57–1)
EGFRCR SERPLBLD CKD-EPI 2021: 66.9 ML/MIN/1.73
EOSINOPHIL # BLD AUTO: 0 10*3/MM3 (ref 0–0.4)
EOSINOPHIL NFR BLD AUTO: 0 % (ref 0.3–6.2)
EPI CELLS #/AREA URNS HPF: ABNORMAL /HPF
ERYTHROCYTE [DISTWIDTH] IN BLOOD BY AUTOMATED COUNT: 12.7 % (ref 12.3–15.4)
FOLATE SERPL-MCNC: 11.2 NG/ML (ref 4.78–24.2)
GLOBULIN SER CALC-MCNC: 1.5 GM/DL
GLUCOSE SERPL-MCNC: 94 MG/DL (ref 65–99)
GLUCOSE UR QL STRIP: NEGATIVE
HBA1C MFR BLD: 5.7 % (ref 4.8–5.6)
HCT VFR BLD AUTO: 42.3 % (ref 34–46.6)
HDLC SERPL-MCNC: 50 MG/DL (ref 40–60)
HGB BLD-MCNC: 13.4 G/DL (ref 12–15.9)
HGB UR QL STRIP: NEGATIVE
IMM GRANULOCYTES # BLD AUTO: 0.02 10*3/MM3 (ref 0–0.05)
IMM GRANULOCYTES NFR BLD AUTO: 0.3 % (ref 0–0.5)
KETONES UR QL STRIP: NEGATIVE
LDLC SERPL CALC-MCNC: 114 MG/DL (ref 0–100)
LEUKOCYTE ESTERASE UR QL STRIP: ABNORMAL
LYMPHOCYTES # BLD AUTO: 2.5 10*3/MM3 (ref 0.7–3.1)
LYMPHOCYTES NFR BLD AUTO: 41 % (ref 19.6–45.3)
MAGNESIUM SERPL-MCNC: 2.1 MG/DL (ref 1.6–2.4)
MCH RBC QN AUTO: 29.5 PG (ref 26.6–33)
MCHC RBC AUTO-ENTMCNC: 31.7 G/DL (ref 31.5–35.7)
MCV RBC AUTO: 93.2 FL (ref 79–97)
MONOCYTES # BLD AUTO: 0.58 10*3/MM3 (ref 0.1–0.9)
MONOCYTES NFR BLD AUTO: 9.5 % (ref 5–12)
NEUTROPHILS # BLD AUTO: 2.99 10*3/MM3 (ref 1.7–7)
NEUTROPHILS NFR BLD AUTO: 49 % (ref 42.7–76)
NITRITE UR QL STRIP: NEGATIVE
NRBC BLD AUTO-RTO: 0 /100 WBC (ref 0–0.2)
PH UR STRIP: 7 [PH] (ref 5–8)
PLATELET # BLD AUTO: 162 10*3/MM3 (ref 140–450)
POTASSIUM SERPL-SCNC: 4.5 MMOL/L (ref 3.5–5.2)
PROT SERPL-MCNC: 5.7 G/DL (ref 6–8.5)
PROT UR QL STRIP: NEGATIVE
RBC # BLD AUTO: 4.54 10*6/MM3 (ref 3.77–5.28)
RBC #/AREA URNS HPF: ABNORMAL /HPF
SODIUM SERPL-SCNC: 143 MMOL/L (ref 136–145)
SP GR UR STRIP: 1.02 (ref 1–1.03)
T4 FREE SERPL-MCNC: 1.43 NG/DL (ref 0.92–1.68)
TRIGL SERPL-MCNC: 135 MG/DL (ref 0–150)
TSH SERPL DL<=0.005 MIU/L-ACNC: 3.29 UIU/ML (ref 0.27–4.2)
UROBILINOGEN UR STRIP-MCNC: ABNORMAL MG/DL
VIT B12 SERPL-MCNC: 617 PG/ML (ref 211–946)
VLDLC SERPL CALC-MCNC: 24 MG/DL (ref 5–40)
WBC # BLD AUTO: 6.1 10*3/MM3 (ref 3.4–10.8)
WBC #/AREA URNS HPF: ABNORMAL /HPF

## 2024-09-24 PROCEDURE — 74221 X-RAY XM ESOPHAGUS 2CNTRST: CPT

## 2024-09-24 RX ADMIN — ANTACID/ANTIFLATULENT 1 PACKET: 380; 550; 10; 10 GRANULE, EFFERVESCENT ORAL at 11:49

## 2024-09-24 RX ADMIN — BARIUM SULFATE 135 ML: 980 POWDER, FOR SUSPENSION ORAL at 11:49

## 2024-09-24 RX ADMIN — BARIUM SULFATE 700 MG: 700 TABLET ORAL at 11:49

## 2024-09-24 RX ADMIN — BARIUM SULFATE 183 ML: 960 POWDER, FOR SUSPENSION ORAL at 11:49

## 2024-09-25 PROBLEM — F41.1 GENERALIZED ANXIETY DISORDER: Status: ACTIVE | Noted: 2024-09-25

## 2024-09-25 PROBLEM — E11.9 TYPE 2 DIABETES MELLITUS WITHOUT COMPLICATION, WITHOUT LONG-TERM CURRENT USE OF INSULIN: Status: ACTIVE | Noted: 2024-09-25

## 2024-09-25 PROBLEM — R91.1 PULMONARY NODULE: Status: ACTIVE | Noted: 2024-09-25

## 2024-09-25 PROBLEM — E03.9 HYPOTHYROIDISM, ACQUIRED: Chronic | Status: ACTIVE | Noted: 2024-09-25

## 2024-09-26 ENCOUNTER — OFFICE VISIT (OUTPATIENT)
Dept: FAMILY MEDICINE CLINIC | Facility: CLINIC | Age: 79
End: 2024-09-26
Payer: MEDICARE

## 2024-09-26 VITALS
BODY MASS INDEX: 41.32 KG/M2 | WEIGHT: 248 LBS | HEIGHT: 65 IN | OXYGEN SATURATION: 98 % | DIASTOLIC BLOOD PRESSURE: 74 MMHG | SYSTOLIC BLOOD PRESSURE: 138 MMHG | HEART RATE: 60 BPM

## 2024-09-26 DIAGNOSIS — I10 PRIMARY HYPERTENSION: Chronic | ICD-10-CM

## 2024-09-26 DIAGNOSIS — F41.1 GENERALIZED ANXIETY DISORDER: Chronic | ICD-10-CM

## 2024-09-26 DIAGNOSIS — E11.9 TYPE 2 DIABETES MELLITUS WITHOUT COMPLICATION, WITHOUT LONG-TERM CURRENT USE OF INSULIN: Primary | Chronic | ICD-10-CM

## 2024-09-26 DIAGNOSIS — R91.1 PULMONARY NODULE: ICD-10-CM

## 2024-09-26 DIAGNOSIS — E03.9 HYPOTHYROIDISM, ACQUIRED: Chronic | ICD-10-CM

## 2024-09-26 DIAGNOSIS — E78.2 MIXED HYPERLIPIDEMIA: Chronic | ICD-10-CM

## 2024-09-26 PROCEDURE — 1160F RVW MEDS BY RX/DR IN RCRD: CPT | Performed by: FAMILY MEDICINE

## 2024-09-26 PROCEDURE — 3078F DIAST BP <80 MM HG: CPT | Performed by: FAMILY MEDICINE

## 2024-09-26 PROCEDURE — 1125F AMNT PAIN NOTED PAIN PRSNT: CPT | Performed by: FAMILY MEDICINE

## 2024-09-26 PROCEDURE — 1159F MED LIST DOCD IN RCRD: CPT | Performed by: FAMILY MEDICINE

## 2024-09-26 PROCEDURE — 3075F SYST BP GE 130 - 139MM HG: CPT | Performed by: FAMILY MEDICINE

## 2024-09-26 PROCEDURE — 99214 OFFICE O/P EST MOD 30 MIN: CPT | Performed by: FAMILY MEDICINE

## 2024-09-26 RX ORDER — EZETIMIBE 10 MG/1
10 TABLET ORAL DAILY
Qty: 90 TABLET | Refills: 1 | Status: SHIPPED | OUTPATIENT
Start: 2024-09-26

## 2024-09-26 RX ORDER — LOSARTAN POTASSIUM 50 MG/1
50 TABLET ORAL DAILY
Qty: 90 TABLET | Refills: 1 | Status: SHIPPED | OUTPATIENT
Start: 2024-09-26

## 2024-09-26 RX ORDER — LEVOTHYROXINE SODIUM 75 UG/1
75 TABLET ORAL DAILY
Qty: 90 TABLET | Refills: 1 | Status: SHIPPED | OUTPATIENT
Start: 2024-09-26

## 2024-09-26 RX ORDER — METOPROLOL SUCCINATE 25 MG/1
25 TABLET, EXTENDED RELEASE ORAL DAILY
Qty: 90 TABLET | Refills: 1 | Status: SHIPPED | OUTPATIENT
Start: 2024-09-26

## 2024-10-01 ENCOUNTER — TELEPHONE (OUTPATIENT)
Dept: CARDIOLOGY | Facility: CLINIC | Age: 79
End: 2024-10-01
Payer: MEDICARE

## 2024-10-01 NOTE — TELEPHONE ENCOUNTER
----- Message from Jason Lo sent at 10/1/2024 10:40 AM EDT -----  I called and reassured her and her other side effects have stopped  ----- Message -----  From: Kelly Steel APRN  Sent: 9/19/2024  11:14 AM EDT  To: Jason Lo MD    A couple things.  She is maintaining sinus rhythm with the propafenone. However, she has a few concerns. She thinks it is causing side effects: diarrhea, insomnia, and increased LE swelling. She is also concerned about it because she read you should not take it with metoprolol or Zoloft and she is on both. She also said Dr. Adame previously diagnosed her with sick sinus syndrome and she also read you should not take propafenone with that diagnosis.

## 2024-10-08 ENCOUNTER — TELEPHONE (OUTPATIENT)
Dept: GASTROENTEROLOGY | Facility: CLINIC | Age: 79
End: 2024-10-08
Payer: MEDICARE

## 2024-10-08 NOTE — TELEPHONE ENCOUNTER
Called pt and advised of Teodora JUÁREZ's note.  Pt verbalized understanding.    Pt is agreeable to EGD.      Update sent to MARQUITA Araiza.

## 2024-10-08 NOTE — TELEPHONE ENCOUNTER
----- Message from Teodora Collinsfarzana sent at 9/25/2024  3:08 PM EDT -----  Please call and let her know that her esophagram showed multiple abnormal findings.  It did show dysmotility of the esophagus which can contribute to the feeling of difficulty swallowing.  She did have an area of narrowing but had no issue passing the barium tablet through there.  She does have some mucosal changes suggesting possible esophagitis which can be due to reflux which is also visualized on the scan.  I recommend we proceed with a updated upper endoscopy if she is okay with that.  Let me know what she thinks and I will place the order.

## 2024-10-09 DIAGNOSIS — R93.3 ABNORMAL ESOPHAGRAM: ICD-10-CM

## 2024-10-09 DIAGNOSIS — K21.9 GASTROESOPHAGEAL REFLUX DISEASE, UNSPECIFIED WHETHER ESOPHAGITIS PRESENT: Primary | ICD-10-CM

## 2024-10-10 PROBLEM — R10.13 EPIGASTRIC PAIN: Status: ACTIVE | Noted: 2022-11-14

## 2024-10-10 PROBLEM — R10.13 DYSPEPSIA: Status: ACTIVE | Noted: 2022-11-14

## 2024-10-16 ENCOUNTER — TELEPHONE (OUTPATIENT)
Dept: GASTROENTEROLOGY | Facility: CLINIC | Age: 79
End: 2024-10-16
Payer: MEDICARE

## 2024-10-16 NOTE — TELEPHONE ENCOUNTER
Provider: LUIZA ROWE    Caller: MICHEL GIANCARLO    Relationship to Patient: SELF    Pharmacy:     Phone Number: [phone]     Reason for Call: PT CALLED TO FIND OUT IF SHE WILL NEED TO KEEP THE 10/23 APPT SINCE SHE AS ALREADY DISCUSSED THE ESOPHAGEAL TEST AND RECOMMEND THE EGD THAT IS SCHEDULE FOR 12/17/24.     PT IS HAVING A LOT OF PAIN  WANTED TO KNOW IF SHE CAN HAVE AN EARLIER EGD APPT.    PLEASE CALL TO ADVISE.

## 2024-10-17 ENCOUNTER — TELEPHONE (OUTPATIENT)
Dept: GASTROENTEROLOGY | Facility: CLINIC | Age: 79
End: 2024-10-17
Payer: MEDICARE

## 2024-10-17 NOTE — TELEPHONE ENCOUNTER
CALENDAR IS FULL AT [facility] WITH PROVIDER MD STRONG     I DO HAVE PT ON A CX LIST IN CASE OF A SPOT TO MOVE HER SCOPE DATE UP OK FOR THE HUB TO RELAY

## 2024-10-19 NOTE — TELEPHONE ENCOUNTER
Thanks, if you wouldnt mind letting her know we do not have anything earlier at this time. Abdomen soft, non-tender and non-distended, no rebound, no guarding and no masses. no hepatosplenomegaly.

## 2024-10-21 RX ORDER — APIXABAN 5 MG/1
5 TABLET, FILM COATED ORAL EVERY 12 HOURS
Qty: 180 TABLET | Refills: 3 | Status: SHIPPED | OUTPATIENT
Start: 2024-10-21

## 2024-11-06 ENCOUNTER — OFFICE VISIT (OUTPATIENT)
Dept: ORTHOPEDIC SURGERY | Facility: CLINIC | Age: 79
End: 2024-11-06
Payer: MEDICARE

## 2024-11-06 VITALS — WEIGHT: 240 LBS | BODY MASS INDEX: 39.99 KG/M2 | TEMPERATURE: 97.7 F | HEIGHT: 65 IN

## 2024-11-06 DIAGNOSIS — R52 PAIN: Primary | ICD-10-CM

## 2024-11-06 DIAGNOSIS — M70.61 GREATER TROCHANTERIC BURSITIS OF RIGHT HIP: ICD-10-CM

## 2024-11-06 RX ADMIN — METHYLPREDNISOLONE ACETATE 80 MG: 80 INJECTION, SUSPENSION INTRA-ARTICULAR; INTRALESIONAL; INTRAMUSCULAR; SOFT TISSUE at 11:35

## 2024-11-06 RX ADMIN — LIDOCAINE HYDROCHLORIDE 2 ML: 10 INJECTION, SOLUTION EPIDURAL; INFILTRATION; INTRACAUDAL; PERINEURAL at 11:35

## 2024-11-06 NOTE — PROGRESS NOTES
Patient: Jessica Ames  YOB: 1945  Date of Service: 11/7/2024    Chief Complaints:   Chief Complaint   Patient presents with    Right Hip - Follow-up, Pain       Subjective:    History of Present Illness: Pt is seen in the office today with complaints of   Chief Complaint   Patient presents with    Right Hip - Follow-up, Pain   .  HIP: TIMING:  The pain is described as ACUTE ON CHRONIC.  AGGRAVATING FACTORS:  Is worsened by prolonged standing, sitting, and walking activities.  CHARACTERISTICS:  aching, stiffness, and difficulty walking.    This problem is not new to this examiner.     Allergies:   Allergies   Allergen Reactions    Adenosine Other (See Comments)     Paralyzed lungs and vocal chords    Lisinopril Cough    Other Unknown - High Severity and Unknown - Low Severity     ANTI-INFLAMMATORY      Celecoxib Palpitations     LE EDEMA    Naproxen Palpitations     LE EDEMA    ALL NSAIDS    Risedronate Swelling and Palpitations     LEG EDEMA    Sulfa Antibiotics Hives       Medications:   Home Medications:  Current Outpatient Medications on File Prior to Visit   Medication Sig    Acetaminophen (TYLENOL PO) Take  by mouth Daily As Needed.    Blood Glucose Monitoring Suppl device 1 each Take As Directed.    Cholecalciferol 2000 units capsule Take 2,000 Units by mouth Daily. One PO daily    Cyanocobalamin (VITAMIN B-12) 1000 MCG sublingual tablet Place 1,000 mcg under the tongue Daily. One SL daily (Patient taking differently: Place 1 tablet under the tongue Daily. TWICE A WEEK)    Eliquis 5 MG tablet tablet TAKE 1 TABLET EVERY 12 HOURS    ezetimibe (ZETIA) 10 MG tablet Take 1 tablet by mouth Daily. for cholesterol    glucose blood test strip Use as instructed    Lancets (freestyle) lancets Check BS fasting and HS daily    levothyroxine (SYNTHROID, LEVOTHROID) 75 MCG tablet Take 1 tablet by mouth Daily.    losartan (COZAAR) 50 MG tablet Take 1 tablet by mouth Daily. for blood pressure---make appt     "metoprolol succinate XL (TOPROL-XL) 25 MG 24 hr tablet Take 1 tablet by mouth Daily. TAKE 1 TABLET ONE TIME DAILY FOR HEART    pantoprazole (PROTONIX) 40 MG EC tablet Take 1 tablet by mouth 2 (Two) Times a Day.    propafenone (RYTHMOL) 150 MG tablet Take 1 tablet by mouth 2 (Two) Times a Day.    Semaglutide, 2 MG/DOSE, (OZEMPIC) 8 MG/3ML solution pen-injector Inject 2 mg under the skin into the appropriate area as directed 1 (One) Time Per Week.    sertraline (ZOLOFT) 50 MG tablet Take 1 tablet by mouth Daily. TAKE 1 TABLET EVERY DAY FOR STRESS     No current facility-administered medications on file prior to visit.     Current Medications:  Scheduled Meds:  Continuous Infusions:No current facility-administered medications for this visit.    PRN Meds:.    I have reviewed the patient's medical history in detail and updated the computerized patient record.  Review and summarization of old records include:    Past Medical History:   Diagnosis Date    Abnormal ECG 2005    Allergic 1963    Arthritis     Arthritis of back 2000    Degenerative arthritis in lower back    Arthritis of neck     Bleeding disorder 2013    Escondida factor 5    Bursitis of hip March 2022    Cataract 2015    Cholelithiasis Oct. 2022    Clotting disorder 2013    Coronary artery disease 2015    Depression 2021    Diabetes mellitus 2022    Disease of thyroid gland     DVT (deep venous thrombosis) 2013    right leg; s/p knee replacement; was on xarelto and now baby aspirin; followed by Dr. Parminder Mejia      Essential (primary) hypertension 08/17/2016    Factor V Leiden     Fibrocystic breast 1986    First degree AV block 12/07/2012    GERD (gastroesophageal reflux disease) 12/07/2012    Hematuria, microscopic     Negative urological workup with Dr. Concepcion 6/3/2024    Hiatal hernia     Hip arthrosis     History of colon polyps     \"9 REMOVED\"    History of medical problems Polyps removed from stomach    2018    Hypercholesterolemia     unknown    " Hyperlipidemia     Hyperlipidemia 2016    Hypertension     Hypothyroidism 1982    Hypothyroidism, unspecified 2016    IFG (impaired fasting glucose)     Iron deficiency anemia     sees Dr. Clem Bowman     Knee swelling 2000    Knees replaced ‘13 & ‘14    Left anterior fascicular block 2012    Low back strain 2000    Lower extremity edema     Obesity 2012    ERIC (obstructive sleep apnea)     compliant with CPAP machine     Osteoarthrosis 2016    leg    Osteopenia 2000    PAF (paroxysmal atrial fibrillation) 2015    day after colonoscopy went into atrial fibrillation; was on Xarelto and now baby aspirin     Periarthritis of shoulder     Spurs removed in both shoulders    Phlebitis     unknown    Postphlebitic syndrome without complication 2016    Right Lower Extremity    Pulmonary embolism     also had DVT     PVC's (premature ventricular contractions) 2012    Rectal bleeding     Rotator cuff syndrome Both shoulders torn    Surgery on both    Sleep apnea     unknown    SSS (sick sinus syndrome)     Thyroid disease     unknown    Venous insufficiency     followed by Dr. Parminder Mejia         Past Surgical History:   Procedure Laterality Date    BREAST SURGERY      reduction    CARDIAC CATHETERIZATION  3/2013    CATH LAB PROCEDURE       SECTION      CHOLECYSTECTOMY  Oct. 2022    CHOLECYSTECTOMY WITH INTRAOPERATIVE CHOLANGIOGRAM N/A 10/28/2022    Procedure: CHOLECYSTECTOMY LAPAROSCOPIC INTRAOPERATIVE CHOLANGIOGRAM;  Surgeon: Melecio Gan MD;  Location: [facility] OR;  Service: General;  Laterality: N/A;    COLONOSCOPY N/A     dr. torres    COLONOSCOPY      EYE SURGERY      HAND SURGERY      joint removal    HEMORRHOIDECTOMY N/A     HYSTERECTOMY  1984    JOINT REPLACEMENT  Both knees     &     KNEE SURGERY  2013    LUMBAR EPIDURAL INJECTION N/A 10/17/2023    Pt had A Fib episode later that day    REDUCTION  "MAMMAPLASTY  1986    ROTATOR CUFF REPAIR      SHOULDER SURGERY Bilateral 1996    TOTAL KNEE ARTHROPLASTY      UPPER GASTROINTESTINAL ENDOSCOPY  2018    13 polyps removed from stomach.. dr torres    UPPER GASTROINTESTINAL ENDOSCOPY  11/30/2022            Social History     Occupational History    Not on file   Tobacco Use    Smoking status: Never     Passive exposure: Never    Smokeless tobacco: Never    Tobacco comments:     minimal caffeine; Patient does not smoke.   Vaping Use    Vaping status: Never Used   Substance and Sexual Activity    Alcohol use: Yes     Comment: \" once month\"; Patient is non drinker    Drug use: Never     Comment: Drug Abuse: none    Sexual activity: Yes     Partners: Male     Birth control/protection: Hysterectomy     Comment: NA      Social History     Social History Narrative    Not on file        Family History   Problem Relation Age of Onset    Stroke Mother     Diabetes Mother     Hypertension Mother     Uterine cancer Mother     Arthritis Mother     Cancer Mother     Miscarriages / Stillbirths Mother     Osteoporosis Mother     Hypertension Father     Heart attack Father     Emphysema Father     Alcohol abuse Father     COPD Father     Diabetes Father     Heart disease Father     Diabetes Sister     Hypertension Sister     Hearing loss Sister     Thyroid disease Sister     Stroke Brother     Diabetes Brother     Hypertension Brother     Mental illness Maternal Grandmother     Diabetes Other     Malig Hyperthermia Neg Hx        ROS: 14 point review of systems was performed and was negative except for documented findings in HPI and today's encounter.     Allergies:   Allergies   Allergen Reactions    Adenosine Other (See Comments)     Paralyzed lungs and vocal chords    Lisinopril Cough    Other Unknown - High Severity and Unknown - Low Severity     ANTI-INFLAMMATORY      Celecoxib Palpitations     LE EDEMA    Naproxen Palpitations     LE EDEMA    ALL NSAIDS    Risedronate " Swelling and Palpitations     LEG EDEMA    Sulfa Antibiotics Hives     Constitutional:  Denies fever, shaking or chills   Eyes:  Denies change in visual acuity   HENT:  Denies nasal congestion or sore throat   Respiratory:  Denies cough or shortness of breath   Cardiovascular:  Denies chest pain or severe LE edema   GI:  Denies abdominal pain, nausea, vomiting, bloody stools or diarrhea   Musculoskeletal:  Numbness, tingling, or loss of motor function only as noted above in history of present illness.  : Denies painful urination or hematuria  Integument:  Denies rash, lesion or ulceration   Neurologic:  Denies headache or focal weakness  Endocrine:  Denies lymphadenopathy  Psych:  Denies confusion or change in mental status   Hem:  Denies active bleeding      Physical Exam: 79 y.o. female  Wt Readings from Last 3 Encounters:   11/06/24 109 kg (240 lb)   09/26/24 112 kg (248 lb)   09/19/24 112 kg (246 lb 3.2 oz)       Body mass index is 39.94 kg/m².  Facility age limit for growth %tiffany is 20 years.  Vitals:    11/06/24 1122   Temp: 97.7 °F (36.5 °C)     Vital signs reviewed.   General Appearance:    Alert, cooperative, in no acute distress                  Eyes: conjunctiva clear  ENT: external ears and nose atraumatic  CV: no peripheral edema  Resp: normal respiratory effort  Skin: no rashes or wounds; normal turgor  Psych: mood and affect appropriate  Lymph: no nodes appreciated  Neuro: gross sensation intact  Vascular:  Palpable peripheral pulse in noted extremity  Musculoskeletal Extremities: HIP Exam: stiff-legged gait with assistive device right hip 2+ pedal pulses and brisk capillary refill Pedal edema none      Diagnostic Data:  Imaging done today and discussed with the patient:    Indication: pain related symptoms,  Views: 2V AP&LAT right hip(s)   Findings: moderate arthritis  Comparison views: viewed last xray done in the office.      Procedure:  Large Joint Arthrocentesis: L greater trochanteric  bursa  Date/Time: 11/6/2024 11:35 AM  Consent given by: patient  Site marked: site marked  Timeout: Immediately prior to procedure a time out was called to verify the correct patient, procedure, equipment, support staff and site/side marked as required   Supporting Documentation  Indications: pain   Procedure Details  Location: hip - L greater trochanteric bursa  Preparation: Patient was prepped and draped in the usual sterile fashion  Needle gauge: 21 G.  Approach: lateral  Medications administered: 80 mg methylPREDNISolone acetate 80 MG/ML; 2 mL lidocaine PF 1% 1 %  Patient tolerance: patient tolerated the procedure well with no immediate complications        Assessment:     ICD-10-CM ICD-9-CM   1. Pain  R52 780.96   2. Greater trochanteric bursitis of right hip  M70.61 726.5       Plan:   Follow up as indicated.  Ice, elevate, and rest as needed.  The diagnosis(es), natural history, pathophysiology and treatment for diagnosis(es) were discussed. Opportunity given and questions answered.  Biomechanics of pertinent body areas discussed.  When appropriate, the use of ambulatory aids discussed.  BMI:  The concept of BMI body mass index and its importance and implications discussed.    EXERCISES:  Advice on benefits of, and types of regular/moderate exercise pertaining to orthopedic diagnosis(es).  MEDICATIONS:  The risks, benefits, warnings,side effects and alternatives of medications discussed.  Inflammation/pain control; with cold, heat, elevation and/or liniments discussed as appropriate  Cortisone Injection. See procedure note.  PT referral offered and declined.  HOME EXERCISE/PT program encouraged  MEDICAL RECORDS reviewed from other provider(s) for past and current medical history pertinent to this complaint.    11/7/2024

## 2024-11-07 RX ORDER — METHYLPREDNISOLONE ACETATE 80 MG/ML
80 INJECTION, SUSPENSION INTRA-ARTICULAR; INTRALESIONAL; INTRAMUSCULAR; SOFT TISSUE
Status: COMPLETED | OUTPATIENT
Start: 2024-11-06 | End: 2024-11-06

## 2024-11-07 RX ORDER — LIDOCAINE HYDROCHLORIDE 10 MG/ML
2 INJECTION, SOLUTION EPIDURAL; INFILTRATION; INTRACAUDAL; PERINEURAL
Status: COMPLETED | OUTPATIENT
Start: 2024-11-06 | End: 2024-11-06

## 2024-11-11 RX ORDER — PROPAFENONE HYDROCHLORIDE 150 MG/1
150 TABLET, COATED ORAL 2 TIMES DAILY
Qty: 180 TABLET | Refills: 3 | Status: SHIPPED | OUTPATIENT
Start: 2024-11-11

## 2024-11-11 RX ORDER — PROPAFENONE HYDROCHLORIDE 150 MG/1
150 TABLET, COATED ORAL 2 TIMES DAILY
Start: 2024-11-11 | End: 2024-11-11 | Stop reason: SDUPTHER

## 2024-12-09 RX ORDER — PROPAFENONE HYDROCHLORIDE 150 MG/1
150 TABLET, COATED ORAL 2 TIMES DAILY
Qty: 180 TABLET | Refills: 3 | Status: SHIPPED | OUTPATIENT
Start: 2024-12-09

## 2024-12-10 ENCOUNTER — TELEPHONE (OUTPATIENT)
Dept: GASTROENTEROLOGY | Facility: CLINIC | Age: 79
End: 2024-12-10
Payer: MEDICARE

## 2024-12-10 NOTE — TELEPHONE ENCOUNTER
sent to Dr Wally ureña [facility] for approval to hold Eliquis prior to procedure.

## 2024-12-11 ENCOUNTER — TELEPHONE (OUTPATIENT)
Dept: GASTROENTEROLOGY | Facility: CLINIC | Age: 79
End: 2024-12-11
Payer: MEDICARE

## 2024-12-11 NOTE — TELEPHONE ENCOUNTER
I called pt to confirm  scope for 12/17/24.Pt has no questions. Pt is aware the arrival time is 6 am.

## 2024-12-17 ENCOUNTER — ANESTHESIA EVENT (OUTPATIENT)
Dept: GASTROENTEROLOGY | Facility: HOSPITAL | Age: 79
End: 2024-12-17
Payer: MEDICARE

## 2024-12-17 ENCOUNTER — HOSPITAL ENCOUNTER (OUTPATIENT)
Facility: HOSPITAL | Age: 79
Setting detail: HOSPITAL OUTPATIENT SURGERY
Discharge: HOME | End: 2024-12-17
Attending: INTERNAL MEDICINE | Admitting: INTERNAL MEDICINE
Payer: MEDICARE

## 2024-12-17 ENCOUNTER — ANESTHESIA (OUTPATIENT)
Dept: GASTROENTEROLOGY | Facility: HOSPITAL | Age: 79
End: 2024-12-17
Payer: MEDICARE

## 2024-12-17 VITALS
HEIGHT: 64 IN | BODY MASS INDEX: 42.03 KG/M2 | OXYGEN SATURATION: 98 % | DIASTOLIC BLOOD PRESSURE: 77 MMHG | RESPIRATION RATE: 16 BRPM | WEIGHT: 246.2 LBS | HEART RATE: 62 BPM | SYSTOLIC BLOOD PRESSURE: 155 MMHG

## 2024-12-17 DIAGNOSIS — R93.3 ABNORMAL ESOPHAGRAM: ICD-10-CM

## 2024-12-17 DIAGNOSIS — R10.13 DYSPEPSIA: ICD-10-CM

## 2024-12-17 DIAGNOSIS — R10.13 EPIGASTRIC PAIN: ICD-10-CM

## 2024-12-17 DIAGNOSIS — K21.9 GASTROESOPHAGEAL REFLUX DISEASE, UNSPECIFIED WHETHER ESOPHAGITIS PRESENT: ICD-10-CM

## 2024-12-17 LAB — GLUCOSE BLDC GLUCOMTR-MCNC: 97 MG/DL (ref 70–130)

## 2024-12-17 PROCEDURE — 25810000003 LACTATED RINGERS PER 1000 ML: Performed by: INTERNAL MEDICINE

## 2024-12-17 PROCEDURE — 25010000002 PROPOFOL 10 MG/ML EMULSION: Performed by: NURSE ANESTHETIST, CERTIFIED REGISTERED

## 2024-12-17 PROCEDURE — 88305 TISSUE EXAM BY PATHOLOGIST: CPT | Performed by: INTERNAL MEDICINE

## 2024-12-17 PROCEDURE — C1726 CATH, BAL DIL, NON-VASCULAR: HCPCS | Performed by: INTERNAL MEDICINE

## 2024-12-17 PROCEDURE — 25010000002 LIDOCAINE 2% SOLUTION: Performed by: NURSE ANESTHETIST, CERTIFIED REGISTERED

## 2024-12-17 PROCEDURE — 43239 EGD BIOPSY SINGLE/MULTIPLE: CPT | Performed by: INTERNAL MEDICINE

## 2024-12-17 PROCEDURE — S0260 H&P FOR SURGERY: HCPCS | Performed by: INTERNAL MEDICINE

## 2024-12-17 PROCEDURE — 82948 REAGENT STRIP/BLOOD GLUCOSE: CPT

## 2024-12-17 PROCEDURE — 43249 ESOPH EGD DILATION <30 MM: CPT | Performed by: INTERNAL MEDICINE

## 2024-12-17 PROCEDURE — 25010000002 GLYCOPYRROLATE 0.2 MG/ML SOLUTION: Performed by: NURSE ANESTHETIST, CERTIFIED REGISTERED

## 2024-12-17 RX ORDER — PROPOFOL 10 MG/ML
VIAL (ML) INTRAVENOUS AS NEEDED
Status: DISCONTINUED | OUTPATIENT
Start: 2024-12-17 | End: 2024-12-17 | Stop reason: SURG

## 2024-12-17 RX ORDER — GLYCOPYRROLATE 0.2 MG/ML
INJECTION INTRAMUSCULAR; INTRAVENOUS AS NEEDED
Status: DISCONTINUED | OUTPATIENT
Start: 2024-12-17 | End: 2024-12-17 | Stop reason: SURG

## 2024-12-17 RX ORDER — SODIUM CHLORIDE, SODIUM LACTATE, POTASSIUM CHLORIDE, CALCIUM CHLORIDE 600; 310; 30; 20 MG/100ML; MG/100ML; MG/100ML; MG/100ML
50 INJECTION, SOLUTION INTRAVENOUS CONTINUOUS
Status: DISCONTINUED | OUTPATIENT
Start: 2024-12-17 | End: 2024-12-17 | Stop reason: HOSPADM

## 2024-12-17 RX ORDER — LIDOCAINE HYDROCHLORIDE 20 MG/ML
INJECTION, SOLUTION INFILTRATION; PERINEURAL AS NEEDED
Status: DISCONTINUED | OUTPATIENT
Start: 2024-12-17 | End: 2024-12-17 | Stop reason: SURG

## 2024-12-17 RX ADMIN — LIDOCAINE HYDROCHLORIDE 60 MG: 20 INJECTION, SOLUTION INFILTRATION; PERINEURAL at 07:38

## 2024-12-17 RX ADMIN — GLYCOPYRROLATE 0.2 MG: 0.2 INJECTION INTRAMUSCULAR; INTRAVENOUS at 07:36

## 2024-12-17 RX ADMIN — PROPOFOL 180 MCG/KG/MIN: 10 INJECTION, EMULSION INTRAVENOUS at 07:39

## 2024-12-17 RX ADMIN — SODIUM CHLORIDE, SODIUM LACTATE, POTASSIUM CHLORIDE, CALCIUM CHLORIDE 50 ML/HR: 20; 30; 600; 310 INJECTION, SOLUTION INTRAVENOUS at 07:20

## 2024-12-17 RX ADMIN — PROPOFOL 100 MG: 10 INJECTION, EMULSION INTRAVENOUS at 07:39

## 2024-12-17 NOTE — ANESTHESIA PREPROCEDURE EVALUATION
Anesthesia Evaluation     Patient summary reviewed and Nursing notes reviewed   NPO Solid Status: > 8 hours  NPO Liquid Status: > 2 hours           Airway   Mallampati: II  TM distance: >3 FB  Neck ROM: full  No difficulty expected  Comment: Grade I view with Smith 2  Dental - normal exam     Pulmonary - normal exam   (+) pulmonary embolism,sleep apnea on CPAP  Cardiovascular     ECG reviewed  Rhythm: regular  Rate: abnormal    (+) hypertension 2 medications or greater, CAD, dysrhythmias Paroxysmal Atrial Fib, PVC, Bradycardia, DVT, hyperlipidemia    ROS comment: EF 63%, mild AI, mild MR, mild TR by ECHO 9/18/normal stress test 9/18  PE comment: SB/rate 53    Neuro/Psych  (+) psychiatric history Anxiety and Depression  GI/Hepatic/Renal/Endo    (+) obesity, morbid obesity, hiatal hernia, GERD, GI bleeding , diabetes mellitus type 2, thyroid problem hypothyroidism    Musculoskeletal     (+) back pain      ROS comment: Hx bilateral rotator cuff surgery  Abdominal   (+) obese   Substance History      OB/GYN          Other   arthritis,       Other Comment: Factor V Leiden                Anesthesia Plan    ASA 3     MAC     intravenous induction     Anesthetic plan, risks, benefits, and alternatives have been provided, discussed and informed consent has been obtained with: patient.      CODE STATUS:

## 2024-12-17 NOTE — DISCHARGE INSTRUCTIONS
For the next 24 hours patient needs to be with a responsible adult.    For 24 hours DO NOT drive, operate machinery, appliances, drink alcohol, make important decisions or sign legal documents.    Start with a light or bland diet if you are feeling sick to your stomach otherwise advance to regular diet as tolerated.    Follow recommendations on procedure report if provided by your doctor.    Call Dr. Cannon for problems [phone]    Problems may include but not limited to: large amounts of bleeding, trouble breathing, repeated vomiting, severe unrelieved pain, fever or chills.

## 2024-12-17 NOTE — ANESTHESIA POSTPROCEDURE EVALUATION
Patient: Jessica Ames    Procedure Summary       Date: 12/17/24 Room / Location: [facility] ENDOSCOPY 8 /  [facility]    Anesthesia Start: 0734 Anesthesia Stop: 0748    Procedure: ESOPHAGOGASTRODUODENOSCOPY with cold biopsies and #56 ft Wolf & 15-18mm balloon dilation (Esophagus) Diagnosis:       Epigastric pain      Dyspepsia      (Epigastric pain [R10.13])      (Dyspepsia [R10.13])    Surgeons: Baldemar Cannon MD Provider: Alex Robertson MD    Anesthesia Type: MAC ASA Status: 3            Anesthesia Type: MAC    Vitals  Vitals Value Taken Time   /72 12/17/24 0812   Temp     Pulse 64 12/17/24 0816   Resp 16 12/17/24 0810   SpO2 96 % 12/17/24 0816   Vitals shown include unfiled device data.        Post Anesthesia Care and Evaluation    Patient location during evaluation: PHASE II  Patient participation: complete - patient participated  Level of consciousness: awake and alert  Pain management: adequate    Airway patency: patent  Anesthetic complications: No anesthetic complications  PONV Status: none  Cardiovascular status: acceptable and hemodynamically stable  Respiratory status: acceptable, nonlabored ventilation and spontaneous ventilation  Hydration status: acceptable

## 2024-12-17 NOTE — H&P
"[facility]  Pre Procedure History & Physical    Chief Complaint:   Time for my egd    Subjective     HPI:   79 y.o. female presenting to endoscopy unit today for egd    Past Medical History:   Past Medical History:   Diagnosis Date    Abnormal ECG 2005    Allergic 1963    Arthritis     Arthritis of back 2000    Degenerative arthritis in lower back    Arthritis of neck     Bleeding disorder 2013    [place] factor 5    Bursitis of hip March 2022    Cataract 2015    Cholelithiasis Oct. 2022    Clotting disorder 2013    Coronary artery disease 2015    Depression 2021    Diabetes mellitus 2022    Disease of thyroid gland     DVT (deep venous thrombosis) 2013    right leg; s/p knee replacement; was on xarelto and now baby aspirin; followed by Dr. Parminder Mejia      Essential (primary) hypertension 08/17/2016    Factor V Leiden     Fibrocystic breast 1986    First degree AV block 12/07/2012    GERD (gastroesophageal reflux disease) 12/07/2012    Hematuria, microscopic     Negative urological workup with Dr. Concepcion 6/3/2024    Hiatal hernia     Hip arthrosis     History of colon polyps     \"9 REMOVED\"    History of medical problems Polyps removed from stomach    2018    Hypercholesterolemia     unknown    Hyperlipidemia     Hyperlipidemia 08/17/2016    Hypertension     Hypothyroidism 1982    Hypothyroidism, unspecified 08/17/2016    IFG (impaired fasting glucose)     Iron deficiency anemia     sees Dr. Clem Bowman     Knee swelling 2000    Knees replaced ‘13 & ‘14    Left anterior fascicular block 12/07/2012    Low back strain 2000    Lower extremity edema     Obesity 12/07/2012    ERIC (obstructive sleep apnea)     compliant with CPAP machine     Osteoarthrosis 08/17/2016    leg    Osteopenia 2000    PAF (paroxysmal atrial fibrillation) 11/2015    day after colonoscopy went into atrial fibrillation; was on Xarelto and now baby aspirin     Periarthritis of shoulder     Spurs removed in both " shoulders    Phlebitis     unknown    Postphlebitic syndrome without complication 08/17/2016    Right Lower Extremity    Pulmonary embolism 2013    also had DVT     PVC's (premature ventricular contractions) 12/07/2012    Rectal bleeding     Rotator cuff syndrome Both shoulders torn    Surgery on both    Sleep apnea     unknown    SSS (sick sinus syndrome)     Thyroid disease     unknown    Venous insufficiency     followed by Dr. Parminder Mejia        Family History:  Family History   Problem Relation Age of Onset    Stroke Mother     Diabetes Mother     Hypertension Mother     Uterine cancer Mother     Arthritis Mother     Cancer Mother     Miscarriages / Stillbirths Mother     Osteoporosis Mother     Hypertension Father     Heart attack Father     Emphysema Father     Alcohol abuse Father     COPD Father     Diabetes Father     Heart disease Father     Diabetes Sister     Hypertension Sister     Hearing loss Sister     Thyroid disease Sister     Stroke Brother     Diabetes Brother     Hypertension Brother     Mental illness Maternal Grandmother     Diabetes Other     Malig Hyperthermia Neg Hx        Social History:   reports that she has never smoked. She has never been exposed to tobacco smoke. She has never used smokeless tobacco. She reports current alcohol use. She reports that she does not use drugs.    Medications:   Medications Prior to Admission   Medication Sig Dispense Refill Last Dose/Taking    Acetaminophen (TYLENOL PO) Take  by mouth Daily As Needed.   12/16/2024    Blood Glucose Monitoring Suppl device 1 each Take As Directed. 1 each 0 Taking    Cholecalciferol 2000 units capsule Take 2,000 Units by mouth Daily. One PO daily 90 each 3 12/16/2024    Cyanocobalamin (VITAMIN B-12) 1000 MCG sublingual tablet Place 1,000 mcg under the tongue Daily. One SL daily (Patient taking differently: Place 1 tablet under the tongue Daily. TWICE A WEEK) 90 each 3 Past Week    ezetimibe (ZETIA) 10 MG tablet Take 1  "tablet by mouth Daily. for cholesterol 90 tablet 1 12/17/2024 at  5:30 AM    glucose blood test strip Use as instructed 200 each 12 Taking    Lancets (freestyle) lancets Check BS fasting and HS daily 100 each 12 Taking    levothyroxine (SYNTHROID, LEVOTHROID) 75 MCG tablet Take 1 tablet by mouth Daily. 90 tablet 1 12/17/2024 at  5:30 AM    losartan (COZAAR) 50 MG tablet Take 1 tablet by mouth Daily. for blood pressure---make appt 90 tablet 1 12/16/2024    metoprolol succinate XL (TOPROL-XL) 25 MG 24 hr tablet Take 1 tablet by mouth Daily. TAKE 1 TABLET ONE TIME DAILY FOR HEART (Patient taking differently: Take 1 tablet by mouth Every Night. TAKE 1 TABLET ONE TIME DAILY FOR HEART) 90 tablet 1 12/16/2024    pantoprazole (PROTONIX) 40 MG EC tablet Take 1 tablet by mouth 2 (Two) Times a Day. 180 tablet 3 12/17/2024 at  5:30 AM    propafenone (RYTHMOL) 150 MG tablet Take 1 tablet by mouth 2 (Two) Times a Day. 180 tablet 3 12/17/2024 at  5:30 AM    sertraline (ZOLOFT) 50 MG tablet Take 1 tablet by mouth Daily. TAKE 1 TABLET EVERY DAY FOR STRESS 90 tablet 1 12/16/2024    Eliquis 5 MG tablet tablet TAKE 1 TABLET EVERY 12 HOURS 180 tablet 3 12/14/2024 Evening    Semaglutide, 2 MG/DOSE, (OZEMPIC) 8 MG/3ML solution pen-injector Inject 2 mg under the skin into the appropriate area as directed 1 (One) Time Per Week. (Patient taking differently: Inject 2 mg under the skin into the appropriate area as directed 1 (One) Time Per Week. On hold for EGD) 9 mL 1 12/2/2024       Allergies:  Adenosine, Lisinopril, Other, Celecoxib, Naproxen, Risedronate, and Sulfa antibiotics    Objective     Blood pressure 137/76, pulse 53, resp. rate 18, height 162.6 cm (64\"), weight 112 kg (246 lb 3.2 oz), SpO2 95%, not currently breastfeeding.  Physical Exam:   General: patient awake, alert and cooperative    Assessment & Plan     Diagnosis:  dysphagia    Anticipated Surgical Procedure:  Egd     The risks, benefits, and alternatives of this procedure " have been discussed with the patient or the responsible party- the patient understands and agrees to proceed.

## 2024-12-18 ENCOUNTER — OFFICE VISIT (OUTPATIENT)
Dept: ORTHOPEDIC SURGERY | Facility: CLINIC | Age: 79
End: 2024-12-18
Payer: MEDICARE

## 2024-12-18 VITALS — TEMPERATURE: 97.8 F | HEIGHT: 65 IN | BODY MASS INDEX: 41.32 KG/M2 | WEIGHT: 248 LBS

## 2024-12-18 DIAGNOSIS — M19.011 PRIMARY OSTEOARTHRITIS OF RIGHT SHOULDER: ICD-10-CM

## 2024-12-18 DIAGNOSIS — M25.511 CHRONIC RIGHT SHOULDER PAIN: Primary | ICD-10-CM

## 2024-12-18 DIAGNOSIS — G89.29 CHRONIC RIGHT SHOULDER PAIN: Primary | ICD-10-CM

## 2024-12-18 DIAGNOSIS — M75.101 TEAR OF RIGHT ROTATOR CUFF, UNSPECIFIED TEAR EXTENT, UNSPECIFIED WHETHER TRAUMATIC: ICD-10-CM

## 2024-12-18 LAB
CYTO UR: NORMAL
LAB AP CASE REPORT: NORMAL
PATH REPORT.FINAL DX SPEC: NORMAL
PATH REPORT.GROSS SPEC: NORMAL

## 2024-12-18 RX ORDER — METHYLPREDNISOLONE ACETATE 80 MG/ML
1 INJECTION, SUSPENSION INTRA-ARTICULAR; INTRALESIONAL; INTRAMUSCULAR; SOFT TISSUE
Status: COMPLETED | OUTPATIENT
Start: 2024-12-18 | End: 2024-12-18

## 2024-12-18 RX ORDER — LIDOCAINE HYDROCHLORIDE 10 MG/ML
2 INJECTION, SOLUTION EPIDURAL; INFILTRATION; INTRACAUDAL; PERINEURAL
Status: COMPLETED | OUTPATIENT
Start: 2024-12-18 | End: 2024-12-18

## 2024-12-18 RX ADMIN — METHYLPREDNISOLONE ACETATE 1 ML: 80 INJECTION, SUSPENSION INTRA-ARTICULAR; INTRALESIONAL; INTRAMUSCULAR; SOFT TISSUE at 15:10

## 2024-12-18 RX ADMIN — LIDOCAINE HYDROCHLORIDE 2 ML: 10 INJECTION, SOLUTION EPIDURAL; INFILTRATION; INTRACAUDAL; PERINEURAL at 15:10

## 2024-12-18 NOTE — PROGRESS NOTES
Ms. Ames comes in today for follow-up.  Injections have worked well in the past.  The patient would like to get a repeat injection today.  The risks, benefits and alternatives were discussed, particularly elevated risk with respect to her diabetes.   Going forward, the patient will follow-up as needed.    Tiarra Desir, MIRA    12/18/2024      Large Joint Arthrocentesis: R glenohumeral  Date/Time: 12/18/2024 3:10 PM  Consent given by: patient  Site marked: site marked  Timeout: Immediately prior to procedure a time out was called to verify the correct patient, procedure, equipment, support staff and site/side marked as required   Supporting Documentation  Indications: pain   Procedure Details  Location: shoulder - R glenohumeral  Preparation: Patient was prepped and draped in the usual sterile fashion  Needle gauge: 21G.  Approach: anterior  Medications administered: 1 mL methylPREDNISolone acetate 80 MG/ML; 2 mL lidocaine PF 1% 1 %  Patient tolerance: patient tolerated the procedure well with no immediate complications

## 2024-12-23 ENCOUNTER — TELEPHONE (OUTPATIENT)
Dept: GASTROENTEROLOGY | Facility: CLINIC | Age: 79
End: 2024-12-23
Payer: MEDICARE

## 2024-12-23 NOTE — TELEPHONE ENCOUNTER
----- Message from Baldemar Cannon sent at 12/23/2024  8:35 AM EST -----  No evidence of EOE or reflux esophagitis  Office visit [place] 8 weeks

## 2024-12-23 NOTE — TELEPHONE ENCOUNTER
Pt reviewed results via [facility].     Sent pt [facility] msg advising of results and recommendations. Advised to call if any questions.

## 2024-12-27 DIAGNOSIS — K21.9 GASTROESOPHAGEAL REFLUX DISEASE, UNSPECIFIED WHETHER ESOPHAGITIS PRESENT: ICD-10-CM

## 2024-12-27 RX ORDER — PANTOPRAZOLE SODIUM 40 MG/1
40 TABLET, DELAYED RELEASE ORAL 2 TIMES DAILY
Qty: 180 TABLET | Refills: 3 | Status: CANCELLED | OUTPATIENT
Start: 2024-12-27

## 2024-12-27 NOTE — TELEPHONE ENCOUNTER
Rx Refill Note  Requested Prescriptions      No prescriptions requested or ordered in this encounter      Last office visit with prescribing clinician: 9/26/2024   Last telemedicine visit with prescribing clinician: Visit date not found   Next office visit with prescribing clinician: Visit date not found                         Would you like a call back once the refill request has been completed: [] Yes [] No    If the office needs to give you a call back, can they leave a voicemail: [] Yes [] No    Nicolás Bowman MA  12/27/24, 14:32 EST

## 2025-01-14 ENCOUNTER — OFFICE VISIT (OUTPATIENT)
Dept: CARDIOLOGY | Facility: CLINIC | Age: 80
End: 2025-01-14
Payer: MEDICARE

## 2025-01-14 VITALS
HEART RATE: 56 BPM | BODY MASS INDEX: 40.02 KG/M2 | SYSTOLIC BLOOD PRESSURE: 134 MMHG | WEIGHT: 240.2 LBS | DIASTOLIC BLOOD PRESSURE: 80 MMHG | HEIGHT: 65 IN

## 2025-01-14 DIAGNOSIS — I48.0 PAROXYSMAL ATRIAL FIBRILLATION: Primary | ICD-10-CM

## 2025-01-14 DIAGNOSIS — I10 PRIMARY HYPERTENSION: ICD-10-CM

## 2025-01-14 PROCEDURE — 1159F MED LIST DOCD IN RCRD: CPT | Performed by: NURSE PRACTITIONER

## 2025-01-14 PROCEDURE — 3075F SYST BP GE 130 - 139MM HG: CPT | Performed by: NURSE PRACTITIONER

## 2025-01-14 PROCEDURE — 1160F RVW MEDS BY RX/DR IN RCRD: CPT | Performed by: NURSE PRACTITIONER

## 2025-01-14 PROCEDURE — 93000 ELECTROCARDIOGRAM COMPLETE: CPT | Performed by: NURSE PRACTITIONER

## 2025-01-14 PROCEDURE — 3079F DIAST BP 80-89 MM HG: CPT | Performed by: NURSE PRACTITIONER

## 2025-01-14 PROCEDURE — 99214 OFFICE O/P EST MOD 30 MIN: CPT | Performed by: NURSE PRACTITIONER

## 2025-01-14 NOTE — PROGRESS NOTES
Date of Office Visit: 2025  Encounter Provider: MIRA Pineda  Place of Service: [facility]  Patient Name: Jessica Ames  :1945    Chief Complaint   Patient presents with    Atrial Fibrillation   :     HPI: Jessica Ames is a 79 y.o. female patient of Dr. Lo's with paroxysmal atrial fibrillation.  Additionally, she has factor V Leiden and has suffered a DVT and pulmonary embolus after a knee replacement surgery.     In 2024, she contacted the office to report recurrent episodes of atrial fibrillation.  Subsequently, she was started on Propafenone.    The office in 2024 at which time she reported diarrhea, weight gain, insomnia, and lower extremity edema which she was attributing to the Propafenone.  Dr. Lo spoke with her and reportedly the symptoms had subsided.  She is here for follow-up.    She has been doing well.  She denies any chest pain, shortness of breath, palpitations, edema, dizziness, syncope, bleeding difficulties or melena.  She reports intermittent fatigue.    Past Medical History:   Diagnosis Date    Abnormal ECG     Allergic 1963    Ankle sprain     Arthritis     Arthritis of back     Degenerative arthritis in lower back    Arthritis of neck     Bleeding disorder     [place] factor 5    Bursitis of hip 2022    Cataract     Cholelithiasis Oct. 2022    Clotting disorder     Coronary artery disease     CTS (carpal tunnel syndrome)     Depression     Diabetes mellitus     Disease of thyroid gland     DVT (deep venous thrombosis)     right leg; s/p knee replacement; was on xarelto and now baby aspirin; followed by Dr. Parminder Mejia      Essential (primary) hypertension 2016    Factor V Leiden     Fibrocystic breast 1986    First degree AV block 2012    Fracture of wrist     Fracture, finger     Fracture, foot     GERD (gastroesophageal reflux disease) 2012     "Hematuria, microscopic     Negative urological workup with Dr. Concepcion 6/3/2024    Hiatal hernia     Hip arthrosis     History of colon polyps     \"9 REMOVED\"    History of medical problems Polyps removed from stomach        Hypercholesterolemia     unknown    Hyperlipidemia     Hyperlipidemia 2016    Hypertension     Hypothyroidism 1982    Hypothyroidism, unspecified 2016    IFG (impaired fasting glucose)     Iron deficiency anemia     sees Dr. Clem Bowman     Knee sprain     Knee swelling     Knees replaced ‘13 & ‘14    Left anterior fascicular block 2012    Low back strain     Lower extremity edema     Neck strain     Obesity 2012    ERIC (obstructive sleep apnea)     compliant with CPAP machine     Osteoarthrosis 2016    leg    Osteopenia     PAF (paroxysmal atrial fibrillation) 2015    day after colonoscopy went into atrial fibrillation; was on Xarelto and now baby aspirin     Periarthritis of shoulder     Spurs removed in both shoulders    Phlebitis     unknown    Postphlebitic syndrome without complication 2016    Right Lower Extremity    Pulmonary embolism     also had DVT     PVC's (premature ventricular contractions) 2012    Rectal bleeding     Rotator cuff syndrome Both shoulders torn    Surgery on both    Sleep apnea     unknown    SSS (sick sinus syndrome)     Stress fracture     Tendinitis of knee     Thyroid disease     unknown    Venous insufficiency     followed by Dr. Parminder Mejia     Wrist sprain        Past Surgical History:   Procedure Laterality Date    BREAST SURGERY      reduction    CARDIAC CATHETERIZATION  3/2013    CATH LAB PROCEDURE       SECTION      CHOLECYSTECTOMY  Oct. 2022    CHOLECYSTECTOMY WITH INTRAOPERATIVE CHOLANGIOGRAM N/A 10/28/2022    Procedure: CHOLECYSTECTOMY LAPAROSCOPIC INTRAOPERATIVE CHOLANGIOGRAM;  Surgeon: Melecio Gan MD;  Location: [facility];  Service: General;  " "Laterality: N/A;    COLONOSCOPY N/A 2018    dr. torres    COLONOSCOPY  2017    ENDOSCOPY N/A 12/17/2024    Procedure: ESOPHAGOGASTRODUODENOSCOPY with cold biopsies and #56 ft Wolf & 15-18mm balloon dilation;  Surgeon: Baldemar Cannon MD;  Location: [facility] ENDOSCOPY;  Service: Gastroenterology;  Laterality: N/A;  Pre - dysphagia.  Post - hiatal hernia    EYE SURGERY  2015    HAND SURGERY  2005    joint removal    HEMORRHOIDECTOMY N/A 1986    HYSTERECTOMY  1984    JOINT REPLACEMENT  Both knees    2013 & 2014    KNEE SURGERY  01/2013    LUMBAR EPIDURAL INJECTION N/A 10/17/2023    Pt had A Fib episode later that day    REDUCTION MAMMAPLASTY  1986    ROTATOR CUFF REPAIR      SHOULDER SURGERY Bilateral 1996    TOTAL KNEE ARTHROPLASTY      TRIGGER POINT INJECTION      UPPER GASTROINTESTINAL ENDOSCOPY  2018    13 polyps removed from stomach.. dr torres    UPPER GASTROINTESTINAL ENDOSCOPY  11/30/2022        WRIST SURGERY         Social History     Socioeconomic History    Marital status:    Tobacco Use    Smoking status: Never     Passive exposure: Never    Smokeless tobacco: Never    Tobacco comments:     minimal caffeine; Patient does not smoke.   Vaping Use    Vaping status: Never Used   Substance and Sexual Activity    Alcohol use: Yes     Comment: \" once month\"; Patient is non drinker    Drug use: Never     Comment: Drug Abuse: none    Sexual activity: Yes     Partners: Male     Birth control/protection: Post-menopausal     Comment: NA       Family History   Problem Relation Age of Onset    Stroke Mother     Diabetes Mother     Hypertension Mother     Uterine cancer Mother     Arthritis Mother     Cancer Mother     Miscarriages / Stillbirths Mother     Osteoporosis Mother     Hypertension Father     Heart attack Father     Emphysema Father     Alcohol abuse Father     COPD Father     Diabetes Father     Heart disease Father     Diabetes Sister     Hypertension Sister     Hearing loss Sister     " Thyroid disease Sister     Stroke Brother     Diabetes Brother     Hypertension Brother     Mental illness Maternal Grandmother     Diabetes Other     Malig Hyperthermia Neg Hx        Review of Systems   Constitutional: Positive for malaise/fatigue.   Cardiovascular: Negative.  Negative for chest pain, dyspnea on exertion, leg swelling, orthopnea, paroxysmal nocturnal dyspnea and syncope.   Respiratory: Negative.     Hematologic/Lymphatic: Negative for bleeding problem.   Musculoskeletal:  Negative for falls.   Gastrointestinal:  Negative for melena.   Neurological:  Negative for dizziness and light-headedness.       Allergies   Allergen Reactions    Adenosine Other (See Comments)     Paralyzed lungs and vocal chords    Lisinopril Cough    Other Unknown - High Severity and Unknown - Low Severity     ANTI-INFLAMMATORY      Celecoxib Palpitations     LE EDEMA    Naproxen Palpitations     LE EDEMA    ALL NSAIDS    Risedronate Swelling and Palpitations     LEG EDEMA    Sulfa Antibiotics Hives         Current Outpatient Medications:     Acetaminophen (TYLENOL PO), Take  by mouth Daily As Needed., Disp: , Rfl:     Blood Glucose Monitoring Suppl device, 1 each Take As Directed., Disp: 1 each, Rfl: 0    Cholecalciferol 2000 units capsule, Take 2,000 Units by mouth Daily. One PO daily, Disp: 90 each, Rfl: 3    Cyanocobalamin (VITAMIN B-12) 1000 MCG sublingual tablet, Place 1,000 mcg under the tongue Daily. One SL daily (Patient taking differently: Place 1 tablet under the tongue Daily. TWICE A WEEK), Disp: 90 each, Rfl: 3    Eliquis 5 MG tablet tablet, TAKE 1 TABLET EVERY 12 HOURS, Disp: 180 tablet, Rfl: 3    ezetimibe (ZETIA) 10 MG tablet, Take 1 tablet by mouth Daily. for cholesterol, Disp: 90 tablet, Rfl: 1    glucose blood test strip, Use as instructed, Disp: 200 each, Rfl: 12    Lancets (freestyle) lancets, Check BS fasting and HS daily, Disp: 100 each, Rfl: 12    levothyroxine (SYNTHROID, LEVOTHROID) 75 MCG tablet,  "Take 1 tablet by mouth Daily., Disp: 90 tablet, Rfl: 1    losartan (COZAAR) 50 MG tablet, Take 1 tablet by mouth Daily. for blood pressure---make appt, Disp: 90 tablet, Rfl: 1    metoprolol succinate XL (TOPROL-XL) 25 MG 24 hr tablet, Take 1 tablet by mouth Daily. TAKE 1 TABLET ONE TIME DAILY FOR HEART (Patient taking differently: Take 1 tablet by mouth Every Night. TAKE 1 TABLET ONE TIME DAILY FOR HEART), Disp: 90 tablet, Rfl: 1    pantoprazole (PROTONIX) 40 MG EC tablet, Take 1 tablet by mouth 2 (Two) Times a Day., Disp: 180 tablet, Rfl: 3    propafenone (RYTHMOL) 150 MG tablet, Take 1 tablet by mouth 2 (Two) Times a Day., Disp: 180 tablet, Rfl: 3    Semaglutide, 2 MG/DOSE, (OZEMPIC) 8 MG/3ML solution pen-injector, Inject 2 mg under the skin into the appropriate area as directed 1 (One) Time Per Week. (Patient taking differently: Inject 2 mg under the skin into the appropriate area as directed 1 (One) Time Per Week. On hold for EGD), Disp: 9 mL, Rfl: 1    sertraline (ZOLOFT) 50 MG tablet, Take 1 tablet by mouth Daily. TAKE 1 TABLET EVERY DAY FOR STRESS, Disp: 90 tablet, Rfl: 1      Objective:     Vitals:    01/14/25 1249   BP: 134/80   Pulse: 56   Weight: 109 kg (240 lb 3.2 oz)   Height: 165.1 cm (65\")     Body mass index is 39.97 kg/m².    PHYSICAL EXAM:    Neck:      Vascular: No JVD.   Pulmonary:      Effort: Pulmonary effort is normal.      Breath sounds: Normal breath sounds.   Cardiovascular:      Normal rate. Regular rhythm.      Murmurs: There is no murmur.      No gallop.  No click. No rub.   Pulses:     Intact distal pulses.       ECG 12 Lead    Date/Time: 1/14/2025 12:56 PM  Performed by: Kelly Steel APRN    Authorized by: Kelly Steel APRN  Comparison: compared with previous ECG from 9/19/2024  Similar to previous ECG  Rhythm: sinus bradycardia  Rate: bradycardic  BPM: 56  Conduction: right bundle branch block and 1st degree AV block  Other findings: non-specific ST-T wave " changes          Assessment:       Diagnosis Plan   1. Paroxysmal atrial fibrillation  ECG 12 Lead      2. Primary hypertension          Orders Placed This Encounter   Procedures    ECG 12 Lead     This order was created via procedure documentation     Order Specific Question:   Release to patient     Answer:   Routine Release [[id number]]          Plan:       1.  Paroxysmal atrial fibrillation.  Maintaining sinus rhythm with Propafenone.  She is rate controlled with metoprolol and anticoagulated with Eliquis.      2.  Hypertension.  Her blood pressure is stable.  Continue losartan and metoprolol.      Overall, I think she is doing well.  I am not making any changes today, and she will follow-up with Dr. Lo in 1 year.      As always, it has been a pleasure to participate in your patient's care.      Sincerely,         MIRA Horvath

## 2025-02-11 DIAGNOSIS — E11.9 TYPE 2 DIABETES MELLITUS WITHOUT COMPLICATION, WITHOUT LONG-TERM CURRENT USE OF INSULIN: Chronic | ICD-10-CM

## 2025-02-12 RX ORDER — SEMAGLUTIDE 2.68 MG/ML
INJECTION, SOLUTION SUBCUTANEOUS
Refills: 3 | OUTPATIENT
Start: 2025-02-12

## 2025-02-13 DIAGNOSIS — E11.9 TYPE 2 DIABETES MELLITUS WITHOUT COMPLICATION, WITHOUT LONG-TERM CURRENT USE OF INSULIN: Chronic | ICD-10-CM

## 2025-02-13 NOTE — TELEPHONE ENCOUNTER
Dr. Morgan,     Would you please send a prescription to [facility] for my Ozempic refill?  I am trying to get it to be my first medication of the new year so I can make it meet my deductible for the year in paying for it.   As opposed to paying for a few of my other medications and then I would still have to fork out the additional cost for the Ozempic.   Hope this makes sense - cynthia.  Thank you,  Jessica Ames    Last visit: 9-26-24  Next visit:   Return in about 6 months (around 3/26/2025) for Recheck.

## 2025-02-14 DIAGNOSIS — E11.9 TYPE 2 DIABETES MELLITUS WITHOUT COMPLICATION, WITHOUT LONG-TERM CURRENT USE OF INSULIN: Chronic | ICD-10-CM

## 2025-02-14 RX ORDER — SEMAGLUTIDE 2.68 MG/ML
INJECTION, SOLUTION SUBCUTANEOUS
Refills: 3 | OUTPATIENT
Start: 2025-02-14

## 2025-02-23 DIAGNOSIS — F41.1 GENERALIZED ANXIETY DISORDER: Chronic | ICD-10-CM

## 2025-02-23 DIAGNOSIS — I10 PRIMARY HYPERTENSION: Chronic | ICD-10-CM

## 2025-02-24 RX ORDER — LOSARTAN POTASSIUM 50 MG/1
TABLET ORAL
Qty: 90 TABLET | Refills: 3 | OUTPATIENT
Start: 2025-02-24

## 2025-02-25 ENCOUNTER — OFFICE VISIT (OUTPATIENT)
Dept: ORTHOPEDIC SURGERY | Facility: CLINIC | Age: 80
End: 2025-02-25
Payer: MEDICARE

## 2025-02-25 VITALS — HEIGHT: 63 IN | WEIGHT: 245 LBS | BODY MASS INDEX: 43.41 KG/M2 | TEMPERATURE: 98 F

## 2025-02-25 DIAGNOSIS — M70.61 GREATER TROCHANTERIC BURSITIS OF RIGHT HIP: Primary | ICD-10-CM

## 2025-02-25 RX ORDER — METHYLPREDNISOLONE ACETATE 80 MG/ML
80 INJECTION, SUSPENSION INTRA-ARTICULAR; INTRALESIONAL; INTRAMUSCULAR; SOFT TISSUE
Status: COMPLETED | OUTPATIENT
Start: 2025-02-25 | End: 2025-02-25

## 2025-02-25 RX ADMIN — METHYLPREDNISOLONE ACETATE 80 MG: 80 INJECTION, SUSPENSION INTRA-ARTICULAR; INTRALESIONAL; INTRAMUSCULAR; SOFT TISSUE at 15:15

## 2025-02-25 NOTE — PROGRESS NOTES
Patient: Jessica Ames  YOB: 1945  Date of Service: 2/25/2025    Chief Complaints:   Chief Complaint   Patient presents with    Right Hip - Pain, Follow-up       Subjective: Patient presents for right hip greater trochanter lateral hip pain desires conservative treatment history of lumbar spinal stenosis she has managed with therapy and conservative treatment in the past    History of Present Illness: Pt is seen in the office today with complaints of   Chief Complaint   Patient presents with    Right Hip - Pain, Follow-up   .  HIP: TIMING:  The pain is described as ACUTE ON CHRONIC.  AGGRAVATING FACTORS:  Is worsened by prolonged standing, sitting, and walking activities.  CHARACTERISTICS:  aching, stiffness, and difficulty walking.    This problem is not new to this examiner.     Allergies:   Allergies   Allergen Reactions    Adenosine Other (See Comments)     Paralyzed lungs and vocal chords    Lisinopril Cough    Other Unknown - High Severity and Unknown - Low Severity     ANTI-INFLAMMATORY      Celecoxib Palpitations     LE EDEMA    Naproxen Palpitations     LE EDEMA    ALL NSAIDS    Risedronate Swelling and Palpitations     LEG EDEMA    Sulfa Antibiotics Hives       Medications:   Home Medications:  Current Outpatient Medications on File Prior to Visit   Medication Sig    Acetaminophen (TYLENOL PO) Take  by mouth Daily As Needed.    Blood Glucose Monitoring Suppl device 1 each Take As Directed.    Cholecalciferol 2000 units capsule Take 2,000 Units by mouth Daily. One PO daily    Cyanocobalamin (VITAMIN B-12) 1000 MCG sublingual tablet Place 1,000 mcg under the tongue Daily. One SL daily (Patient taking differently: Place 1 tablet under the tongue Daily. TWICE A WEEK)    Eliquis 5 MG tablet tablet TAKE 1 TABLET EVERY 12 HOURS    ezetimibe (ZETIA) 10 MG tablet Take 1 tablet by mouth Daily. for cholesterol    glucose blood test strip Use as instructed    Lancets (freestyle) lancets Check BS fasting  and HS daily    levothyroxine (SYNTHROID, LEVOTHROID) 75 MCG tablet Take 1 tablet by mouth Daily.    losartan (COZAAR) 50 MG tablet Take 1 tablet by mouth Daily. for blood pressure---make appt    metoprolol succinate XL (TOPROL-XL) 25 MG 24 hr tablet Take 1 tablet by mouth Daily. TAKE 1 TABLET ONE TIME DAILY FOR HEART (Patient taking differently: Take 1 tablet by mouth Every Night. TAKE 1 TABLET ONE TIME DAILY FOR HEART)    pantoprazole (PROTONIX) 40 MG EC tablet Take 1 tablet by mouth 2 (Two) Times a Day.    propafenone (RYTHMOL) 150 MG tablet Take 1 tablet by mouth 2 (Two) Times a Day.    Semaglutide, 2 MG/DOSE, (OZEMPIC) 8 MG/3ML solution pen-injector Inject 2 mg under the skin into the appropriate area as directed 1 (One) Time Per Week.    sertraline (ZOLOFT) 50 MG tablet Take 1 tablet by mouth Daily. TAKE 1 TABLET EVERY DAY FOR STRESS     No current facility-administered medications on file prior to visit.     Current Medications:  Scheduled Meds:  Continuous Infusions:No current facility-administered medications for this visit.    PRN Meds:.    I have reviewed the patient's medical history in detail and updated the computerized patient record.  Review and summarization of old records include:    Past Medical History:   Diagnosis Date    Abnormal ECG 2005    Allergic 1963    Ankle sprain     Arthritis     Arthritis of back 2000    Degenerative arthritis in lower back    Arthritis of neck     Bleeding disorder 2013    [place] factor 5    Bursitis of hip March 2022    Cataract 2015    Cholelithiasis Oct. 2022    Clotting disorder 2013    Coronary artery disease 2015    CTS (carpal tunnel syndrome)     Depression 2021    Diabetes mellitus 2022    Disease of thyroid gland     DVT (deep venous thrombosis) 2013    right leg; s/p knee replacement; was on xarelto and now baby aspirin; followed by Dr. Parminder Mejia      Essential (primary) hypertension 08/17/2016    Factor V Leiden     Fibrocystic breast 1986    First  "degree AV block 2012    Fracture of wrist     Fracture, finger     Fracture, foot     GERD (gastroesophageal reflux disease) 2012    Hematuria, microscopic     Negative urological workup with Dr. Concepcion 6/3/2024    Hiatal hernia     Hip arthrosis     History of colon polyps     \"9 REMOVED\"    History of medical problems Polyps removed from stomach    2018    Hypercholesterolemia     unknown    Hyperlipidemia     Hyperlipidemia 2016    Hypertension     Hypothyroidism 1982    Hypothyroidism, unspecified 2016    IFG (impaired fasting glucose)     Iron deficiency anemia     sees Dr. Clem Bowman     Knee sprain     Knee swelling     Knees replaced ‘13 & ‘14    Left anterior fascicular block 2012    Low back strain     Lower extremity edema     Neck strain     Obesity 2012    ERIC (obstructive sleep apnea)     compliant with CPAP machine     Osteoarthrosis 2016    leg    Osteopenia     PAF (paroxysmal atrial fibrillation) 2015    day after colonoscopy went into atrial fibrillation; was on Xarelto and now baby aspirin     Periarthritis of shoulder     Spurs removed in both shoulders    Phlebitis     unknown    Postphlebitic syndrome without complication 2016    Right Lower Extremity    Pulmonary embolism     also had DVT     PVC's (premature ventricular contractions) 2012    Rectal bleeding     Rotator cuff syndrome Both shoulders torn    Surgery on both    Sleep apnea     unknown    SSS (sick sinus syndrome)     Stress fracture     Tendinitis of knee     Thyroid disease     unknown    Venous insufficiency     followed by Dr. Parminder Mejia     Wrist sprain         Past Surgical History:   Procedure Laterality Date    BREAST SURGERY      reduction    CARDIAC CATHETERIZATION  3/2013    CATH LAB PROCEDURE       SECTION      CHOLECYSTECTOMY  Oct. 2022    CHOLECYSTECTOMY WITH INTRAOPERATIVE CHOLANGIOGRAM N/A 10/28/2022    Procedure: " "CHOLECYSTECTOMY LAPAROSCOPIC INTRAOPERATIVE CHOLANGIOGRAM;  Surgeon: Melecio Gan MD;  Location: [facility] MAIN OR;  Service: General;  Laterality: N/A;    COLONOSCOPY N/A 2018    dr. torres    COLONOSCOPY  2017    ENDOSCOPY N/A 12/17/2024    Procedure: ESOPHAGOGASTRODUODENOSCOPY with cold biopsies and #56 ft Wolf & 15-18mm balloon dilation;  Surgeon: Baldemar Cannon MD;  Location:  [facility];  Service: Gastroenterology;  Laterality: N/A;  Pre - dysphagia.  Post - hiatal hernia    EYE SURGERY  2015    HAND SURGERY  2005    joint removal    HEMORRHOIDECTOMY N/A 1986    HYSTERECTOMY  1984    JOINT REPLACEMENT  Both knees    2013 & 2014    KNEE SURGERY  01/2013    LUMBAR EPIDURAL INJECTION N/A 10/17/2023    Pt had A Fib episode later that day    REDUCTION MAMMAPLASTY  1986    ROTATOR CUFF REPAIR      SHOULDER SURGERY Bilateral 1996    TOTAL KNEE ARTHROPLASTY      TRIGGER POINT INJECTION      UPPER GASTROINTESTINAL ENDOSCOPY  2018    13 polyps removed from stomach.. dr torres    UPPER GASTROINTESTINAL ENDOSCOPY  11/30/2022        WRIST SURGERY          Social History     Occupational History    Not on file   Tobacco Use    Smoking status: Never     Passive exposure: Never    Smokeless tobacco: Never    Tobacco comments:     minimal caffeine; Patient does not smoke.   Vaping Use    Vaping status: Never Used   Substance and Sexual Activity    Alcohol use: Yes     Comment: \" once month\"; Patient is non drinker    Drug use: Never     Comment: Drug Abuse: none    Sexual activity: Yes     Partners: Male     Birth control/protection: Post-menopausal     Comment: NA      Social History     Social History Narrative    Not on file        Family History   Problem Relation Age of Onset    Stroke Mother     Diabetes Mother     Hypertension Mother     Uterine cancer Mother     Arthritis Mother     Cancer Mother     Miscarriages / Stillbirths Mother     Osteoporosis Mother     Hypertension Father     Heart " attack Father     Emphysema Father     Alcohol abuse Father     COPD Father     Diabetes Father     Heart disease Father     Diabetes Sister     Hypertension Sister     Hearing loss Sister     Thyroid disease Sister     Stroke Brother     Diabetes Brother     Hypertension Brother     Mental illness Maternal Grandmother     Diabetes Other     Malig Hyperthermia Neg Hx        ROS: 14 point review of systems was performed and was negative except for documented findings in HPI and today's encounter.     Allergies:   Allergies   Allergen Reactions    Adenosine Other (See Comments)     Paralyzed lungs and vocal chords    Lisinopril Cough    Other Unknown - High Severity and Unknown - Low Severity     ANTI-INFLAMMATORY      Celecoxib Palpitations     LE EDEMA    Naproxen Palpitations     LE EDEMA    ALL NSAIDS    Risedronate Swelling and Palpitations     LEG EDEMA    Sulfa Antibiotics Hives     Constitutional:  Denies fever, shaking or chills   Eyes:  Denies change in visual acuity   HENT:  Denies nasal congestion or sore throat   Respiratory:  Denies cough or shortness of breath   Cardiovascular:  Denies chest pain or severe LE edema   GI:  Denies abdominal pain, nausea, vomiting, bloody stools or diarrhea   Musculoskeletal:  Numbness, tingling, or loss of motor function only as noted above in history of present illness.  : Denies painful urination or hematuria  Integument:  Denies rash, lesion or ulceration   Neurologic:  Denies headache or focal weakness  Endocrine:  Denies lymphadenopathy  Psych:  Denies confusion or change in mental status   Hem:  Denies active bleeding      Physical Exam: 79 y.o. female  Wt Readings from Last 3 Encounters:   02/25/25 111 kg (245 lb)   01/14/25 109 kg (240 lb 3.2 oz)   12/18/24 112 kg (248 lb)       Vitals:    02/25/25 1351   Temp: 98 °F (36.7 °C)     Vital signs reviewed.   General Appearance:    Alert, cooperative, in no acute distress                  Eyes: conjunctiva  clear  ENT: external ears and nose atraumatic  CV: no peripheral edema  Resp: normal respiratory effort  Skin: no rashes or wounds; normal turgor  Psych: mood and affect appropriate  Lymph: no nodes appreciated  Neuro: gross sensation intact  Vascular:  Palpable peripheral pulse in noted extremity  Musculoskeletal Extremities: HIP Exam: normal gait without assistive device right hip 2+ pedal pulses and brisk capillary refill Pedal edema none      Diagnostic Data:  Imaging done previously in the office and discussed with the patient:    Procedure:  Large Joint Arthrocentesis: R greater trochanteric bursa  Date/Time: 2/25/2025 3:15 PM  Consent given by: patient  Site marked: site marked  Timeout: Immediately prior to procedure a time out was called to verify the correct patient, procedure, equipment, support staff and site/side marked as required   Supporting Documentation  Indications: pain   Procedure Details  Location: hip - R greater trochanteric bursa  Preparation: Patient was prepped and draped in the usual sterile fashion  Needle gauge: 21 G.  Approach: lateral  Medications administered: 2 mL lidocaine (cardiac); 80 mg methylPREDNISolone acetate 80 MG/ML  Patient tolerance: patient tolerated the procedure well with no immediate complications        Assessment:     ICD-10-CM ICD-9-CM   1. Greater trochanteric bursitis of right hip  M70.61 726.5       Plan:   Follow up as indicated.  Ice, elevate, and rest as needed.  The diagnosis(es), natural history, pathophysiology and treatment for diagnosis(es) were discussed. Opportunity given and questions answered.  Biomechanics of pertinent body areas discussed.  When appropriate, the use of ambulatory aids discussed.  BMI:  The concept of BMI body mass index and its importance and implications discussed.    EXERCISES:  Advice on benefits of, and types of regular/moderate exercise pertaining to orthopedic diagnosis(es).  MEDICATIONS:  The risks, benefits, warnings,side  effects and alternatives of medications discussed.  Inflammation/pain control; with cold, heat, elevation and/or liniments discussed as appropriate  Cortisone Injection. See procedure note.  HOME EXERCISE/PT program encouraged  MEDICAL RECORDS reviewed from other provider(s) for past and current medical history pertinent to this complaint.    2/25/2025

## 2025-03-06 ENCOUNTER — OFFICE VISIT (OUTPATIENT)
Dept: GASTROENTEROLOGY | Facility: CLINIC | Age: 80
End: 2025-03-06
Payer: MEDICARE

## 2025-03-06 VITALS
TEMPERATURE: 97.3 F | HEIGHT: 63 IN | WEIGHT: 242.8 LBS | SYSTOLIC BLOOD PRESSURE: 123 MMHG | DIASTOLIC BLOOD PRESSURE: 77 MMHG | BODY MASS INDEX: 43.02 KG/M2 | HEART RATE: 54 BPM

## 2025-03-06 DIAGNOSIS — K21.9 GASTROESOPHAGEAL REFLUX DISEASE, UNSPECIFIED WHETHER ESOPHAGITIS PRESENT: Primary | ICD-10-CM

## 2025-03-06 PROCEDURE — 1159F MED LIST DOCD IN RCRD: CPT | Performed by: PHYSICIAN ASSISTANT

## 2025-03-06 PROCEDURE — 99213 OFFICE O/P EST LOW 20 MIN: CPT | Performed by: PHYSICIAN ASSISTANT

## 2025-03-06 PROCEDURE — 1160F RVW MEDS BY RX/DR IN RCRD: CPT | Performed by: PHYSICIAN ASSISTANT

## 2025-03-06 PROCEDURE — 3078F DIAST BP <80 MM HG: CPT | Performed by: PHYSICIAN ASSISTANT

## 2025-03-06 PROCEDURE — 3074F SYST BP LT 130 MM HG: CPT | Performed by: PHYSICIAN ASSISTANT

## 2025-03-06 NOTE — PROGRESS NOTES
"Chief Complaint  Gastroesophageal reflux disease, unspecified whether esopha and Difficulty Swallowing    Subjective          History Of Present Illness:    Jessica Ames is a  79 y.o. female patient of Dr. Cannon who presents as a follow-up for GERD and dysphagia.    Patient is doing significantly better since starting on PPI therapy.  Patient reports her dysphagia has improved significantly since her scope.  She remains on pantoprazole 40 mg twice daily.  No nausea, vomiting, chest pain, abdominal pain.  I couple episodes of breakthrough reflux.  She denies any melena, hematochezia.  Overall appetite is good and weight is stable.    Additional data reviewed:  EGD 12/17/2024 -no endoscopic abnormality to explain patient's dysphagia status post dilation, 5 cm hiatal hernia, normal stomach and duodenum.  Benign biopsies.  C-scope 2015 normal.     Objective   Vital Signs:   /77 (BP Location: Right arm, Patient Position: Sitting, Cuff Size: Adult)   Pulse 54   Temp 97.3 °F (36.3 °C) (Temporal)   Ht 160 cm (63\")   Wt 110 kg (242 lb 12.8 oz)   BMI 43.01 kg/m²       Physical Exam  Vitals reviewed.   Constitutional:       General: She is not in acute distress.     Appearance: Normal appearance. She is not ill-appearing.   HENT:      Head: Normocephalic and atraumatic.      Nose: Nose normal.      Mouth/Throat:      Pharynx: Oropharynx is clear.   Eyes:      Conjunctiva/sclera: Conjunctivae normal.   Pulmonary:      Effort: Pulmonary effort is normal.   Abdominal:      General: There is no distension.      Palpations: Abdomen is soft. There is no mass.      Tenderness: There is no abdominal tenderness.   Musculoskeletal:         General: No swelling. Normal range of motion.      Cervical back: Normal range of motion.   Skin:     General: Skin is warm and dry.      Findings: No bruising or rash.   Neurological:      General: No focal deficit present.      Mental Status: She is alert and oriented to person, place, " and time.      Motor: No weakness.      Gait: Gait normal.   Psychiatric:         Mood and Affect: Mood normal.          Result Review :   The following data was reviewed by: Teodora Sparrow PA-C on 03/06/2025:  CMP          9/23/2024    10:26   CMP   Glucose 94    BUN 16    Creatinine 0.88    EGFR 66.9    Sodium 143    Potassium 4.5    Chloride 105    Calcium 9.4    Total Protein 5.7    Albumin 4.2    Globulin 1.5    Total Bilirubin 0.5    Alkaline Phosphatase 88    AST (SGOT) 17    ALT (SGPT) 11    Albumin/Globulin Ratio 2.8    BUN/Creatinine Ratio 18.2      CBC          9/23/2024    10:26   CBC   WBC 6.10    RBC 4.54    Hemoglobin 13.4    Hematocrit 42.3    MCV 93.2    MCH 29.5    MCHC 31.7    RDW 12.7    Platelets 162            Assessment and Plan    Diagnoses and all orders for this visit:    1. Gastroesophageal reflux disease, unspecified whether esophagitis present (Primary)       Continue pantoprazole 40 mg twice daily  Continue low acid diet  Follow antireflux measures    Follow Up   Return in about 1 year (around 3/6/2026) for Teodora Palomino PA-C.    Dragon dictation used throughout this note.            Teodora Palomino PA-C   [facility]  [address] [place] [address]  Office: [phone]

## 2025-03-15 NOTE — ASSESSMENT & PLAN NOTE
Hypertension is stable and controlled  Continue current treatment regimen.  Blood pressure will be reassessed in 6 months.    Orders:    losartan (COZAAR) 50 MG tablet; Take 1 tablet by mouth Daily. for blood pressure---make appt    metoprolol succinate XL (TOPROL-XL) 25 MG 24 hr tablet; Take 1 tablet by mouth Daily. TAKE 1 TABLET ONE TIME DAILY FOR HEART

## 2025-03-15 NOTE — PROGRESS NOTES
Subjective   The ABCs of the Annual Wellness Visit  Medicare Wellness Visit      Jessica Ames is a 79 y.o. patient who presents for a Medicare Wellness Visit.    The following portions of the patient's history were reviewed and   updated as appropriate: allergies, current medications, past family history, past medical history, past social history, past surgical history, and problem list.    Compared to one year ago, the patient's physical   health is the same.  Compared to one year ago, the patient's mental   health is the same.    Recent Hospitalizations:  She was not admitted to the hospital during the last year.     Current Medical Providers:  Patient Care Team:  Ishmael Morgan MD as PCP - General (Family Medicine)  Hortencia Jackman PA-C as PCP - Family Medicine  Clem Bowman MD as Consulting Physician (Hematology and Oncology)  Luke Hwang MD as Consulting Physician (Otolaryngology)  Yoseph Mars MD as Consulting Physician (Pulmonary Disease)  Fawad Rowland MD as Consulting Physician (Ophthalmology)  Jr Matthews MD as Consulting Physician (Hand Surgery)  Donavon Osuna MD (Inactive) as Consulting Physician (Gastroenterology)  Jazmin Murdock MD as Consulting Physician (Pulmonary Disease)  Parminder Mejia Jr., MD as Consulting Physician (Vascular Surgery)  Baldemar Cannon MD as Consulting Physician (Gastroenterology)  Jason Lo MD as Consulting Physician (Cardiology)  Ashley Melton PA-C as Physician Assistant (Physician Assistant)  Jim Carvajal MD as Consulting Physician (Otolaryngology)  Ashley Melton PA-C as Physician Assistant (Physician Assistant)  Ashley Dyson APRN as Nurse Practitioner (Orthopedic Surgery)  Angel Doherty MD as Consulting Physician (Pain Medicine)  Roderick Schulz MD as Consulting Physician  Zoraida Davis APRN as Nurse Practitioner (Orthopedic Surgery)  Alvarez Oden MD as Consulting Physician (Orthopedic  Surgery)  Teodora Palomino PA-C as Physician Assistant (Physician Assistant)  Tiarra Desir APRN as Nurse Practitioner (Nurse Practitioner)  Kelly Steel APRN as Nurse Practitioner (Nurse Practitioner)    Outpatient Medications Prior to Visit   Medication Sig Dispense Refill    Acetaminophen (TYLENOL PO) Take  by mouth Daily As Needed.      Blood Glucose Monitoring Suppl device 1 each Take As Directed. 1 each 0    Cholecalciferol 2000 units capsule Take 2,000 Units by mouth Daily. One PO daily 90 each 3    Cyanocobalamin (VITAMIN B-12) 1000 MCG sublingual tablet Place 1,000 mcg under the tongue Daily. One SL daily (Patient taking differently: Place 1 tablet under the tongue Daily. TWICE A WEEK) 90 each 3    Eliquis 5 MG tablet tablet TAKE 1 TABLET EVERY 12 HOURS 180 tablet 3    glucose blood test strip Use as instructed 200 each 12    Lancets (freestyle) lancets Check BS fasting and HS daily 100 each 12    pantoprazole (PROTONIX) 40 MG EC tablet Take 1 tablet by mouth 2 (Two) Times a Day. 180 tablet 3    propafenone (RYTHMOL) 150 MG tablet Take 1 tablet by mouth 2 (Two) Times a Day. 180 tablet 3    ezetimibe (ZETIA) 10 MG tablet Take 1 tablet by mouth Daily. for cholesterol 90 tablet 1    levothyroxine (SYNTHROID, LEVOTHROID) 75 MCG tablet Take 1 tablet by mouth Daily. 90 tablet 1    losartan (COZAAR) 50 MG tablet Take 1 tablet by mouth Daily. for blood pressure---make appt 90 tablet 1    metoprolol succinate XL (TOPROL-XL) 25 MG 24 hr tablet Take 1 tablet by mouth Daily. TAKE 1 TABLET ONE TIME DAILY FOR HEART (Patient taking differently: Take 1 tablet by mouth Every Night. TAKE 1 TABLET ONE TIME DAILY FOR HEART) 90 tablet 1    Semaglutide, 2 MG/DOSE, (OZEMPIC) 8 MG/3ML solution pen-injector Inject 2 mg under the skin into the appropriate area as directed 1 (One) Time Per Week. 9 mL 0    sertraline (ZOLOFT) 50 MG tablet Take 1 tablet by mouth Daily. TAKE 1 TABLET EVERY DAY FOR STRESS 90 tablet 1  "    No facility-administered medications prior to visit.     No opioid medication identified on active medication list. I have reviewed chart for other potential  high risk medication/s and harmful drug interactions in the elderly.      Aspirin is not on active medication list.  Aspirin use is not indicated based on review of current medical condition/s. Risk of harm outweighs potential benefits.  .    Patient Active Problem List   Diagnosis    Hypertension    Hyperlipidemia    ERIC (obstructive sleep apnea)    Impaired fasting glucose    First degree AV block    Left anterior fascicular block    Obesity    Paroxysmal atrial fibrillation    PVC's (premature ventricular contractions)    GERD (gastroesophageal reflux disease)    Macular degeneration    Glaucoma    Venous insufficiency    History of pulmonary embolism    Chronic anticoagulation    DVT (deep venous thrombosis)    Factor 5 Leiden mutation, heterozygous    Osteoporosis without current pathological fracture    Calculus of gallbladder with chronic cholecystitis without obstruction    Right bundle branch block (RBBB)    Leg swelling    Hematuria, microscopic    Type 2 diabetes mellitus without complication, without long-term current use of insulin    Generalized anxiety disorder    Hypothyroidism, acquired    Pulmonary nodule    Epigastric pain    Dyspepsia     Advance Care Planning Advance Directive is not on file.  ACP discussion was held with the patient during this visit. Patient has an advance directive (not in EMR), copy requested.            Objective   Vitals:    03/17/25 1110   BP: 124/72   Pulse: 66   Resp: 14   Temp: 97.8 °F (36.6 °C)   TempSrc: Oral   SpO2: 98%   Weight: 109 kg (240 lb 3.2 oz)   Height: 160 cm (63\")   PainSc: 0-No pain       Estimated body mass index is 42.55 kg/m² as calculated from the following:    Height as of this encounter: 160 cm (63\").    Weight as of this encounter: 109 kg (240 lb 3.2 oz).                Does the patient " have evidence of cognitive impairment? No  Lab Results   Component Value Date    CHLPL 171 03/11/2025    TRIG 168 (H) 03/11/2025    HDL 43 03/11/2025    LDL 99 03/11/2025    VLDL 29 03/11/2025    HGBA1C 5.60 03/11/2025                                                                                                Health  Risk Assessment    Smoking Status:  Social History     Tobacco Use   Smoking Status Never    Passive exposure: Never   Smokeless Tobacco Never   Tobacco Comments    minimal caffeine; Patient does not smoke.     Alcohol Consumption:  Social History     Substance and Sexual Activity   Alcohol Use Not Currently    Comment: Maybe a glass of wine once a month       Fall Risk Screen  STEADI Fall Risk Assessment has not been completed.    Depression Screening   The PHQ has not been completed during this encounter.     Health Habits and Functional and Cognitive Screening:      3/17/2025    11:00 AM   Functional & Cognitive Status   Do you have difficulty preparing food and eating? No   Do you have difficulty bathing yourself, getting dressed or grooming yourself? No   Do you have difficulty using the toilet? No   Do you have difficulty moving around from place to place? No   Do you have trouble with steps or getting out of a bed or a chair? No   Current Diet Limited Junk Food   Dental Exam Up to date   Eye Exam Up to date   Exercise (times per week) 0 times per week   Current Exercises Include No Regular Exercise   Do you need help using the phone?  No   Are you deaf or do you have serious difficulty hearing?  No   Do you need help to go to places out of walking distance? No   Do you need help shopping? No   Do you need help preparing meals?  No   Do you need help with housework?  No   Do you need help with laundry? No   Do you need help taking your medications? No   Do you need help managing money? No   Do you ever drive or ride in a car without wearing a seat belt? No   Have you felt unusual stress, anger  or loneliness in the last month? No   Who do you live with? Spouse   Have you been bothered in the last four weeks by sexual problems? No   Do you have difficulty concentrating, remembering or making decisions? No           Age-appropriate Screening Schedule:  Refer to the list below for future screening recommendations based on patient's age, sex and/or medical conditions. Orders for these recommended tests are listed in the plan section. The patient has been provided with a written plan.    Health Maintenance List  Health Maintenance   Topic Date Due    DIABETIC FOOT EXAM  Never done    URINE MICROALBUMIN-CREATININE RATIO (uACR)  08/15/2024    DIABETIC EYE EXAM  03/28/2025    ZOSTER VACCINE (2 of 2) 03/17/2025 (Originally 10/12/2017)    COVID-19 Vaccine (9 - 2024-25 season) 05/21/2025    HEMOGLOBIN A1C  09/11/2025    DXA SCAN  09/14/2025    LIPID PANEL  03/11/2026    ANNUAL WELLNESS VISIT  03/17/2026    BMI FOLLOWUP  03/17/2026    COLORECTAL CANCER SCREENING  11/01/2026    TDAP/TD VACCINES (3 - Td or Tdap) 08/03/2028    RSV Vaccine - Adults  Completed    INFLUENZA VACCINE  Completed    Pneumococcal Vaccine 50+  Completed    HEPATITIS C SCREENING  Addressed    MAMMOGRAM  Discontinued                                                                                                                                                CMS Preventative Services Quick Reference  Risk Factors Identified During Encounter  None Identified    The above risks/problems have been discussed with the patient.  Pertinent information has been shared with the patient in the After Visit Summary.  An After Visit Summary and PPPS were made available to the patient.    Follow Up:   Next Medicare Wellness visit to be scheduled in 1 year.         Additional E&M Note during same encounter follows:  Patient has additional, significant, and separately identifiable condition(s)/problem(s) that require work above and beyond the Medicare Wellness  "Visit     Chief Complaint  Diabetes, Hypertension, Hyperlipidemia, Hypothyroidism, Arthritis, Anxiety, Depression, and MEDICARE WELLNESS (DUE)    Subjective   HPI  Jessica is also being seen today for additional medical problem/s.    Review of Systems   Constitutional:  Positive for fatigue. Negative for chills, diaphoresis and fever.   HENT:  Negative for congestion, sinus pressure and sore throat.    Eyes:  Negative for visual disturbance.   Respiratory:  Negative for cough and shortness of breath.    Cardiovascular:  Negative for chest pain.   Gastrointestinal:  Negative for abdominal pain, nausea and vomiting.   Genitourinary:  Negative for dysuria.   Musculoskeletal:  Positive for myalgias and neck pain.   Skin:  Negative for rash.   Neurological:  Positive for weakness. Negative for numbness.   Hematological:  Negative for adenopathy.   Psychiatric/Behavioral:  Negative for dysphoric mood.               Objective   Vital Signs:  /72   Pulse 66   Temp 97.8 °F (36.6 °C) (Oral)   Resp 14   Ht 160 cm (63\")   Wt 109 kg (240 lb 3.2 oz)   SpO2 98%   BMI 42.55 kg/m²   Physical Exam  Vitals and nursing note reviewed.   Constitutional:       General: She is not in acute distress.     Appearance: She is well-developed.   Cardiovascular:      Rate and Rhythm: Normal rate and regular rhythm.   Pulmonary:      Effort: Pulmonary effort is normal.      Breath sounds: Normal breath sounds.   Neurological:      Mental Status: She is alert and oriented to person, place, and time.   Psychiatric:         Behavior: Behavior normal.         Thought Content: Thought content normal.     Labs reviewed with pt today during visit. All questions answered.                 Assessment and Plan Additional age appropriate preventative wellness advice topics were discussed during today's preventative wellness exam(some topics already addressed during AWV portion of the note above):    Physical Activity: Advised cardiovascular activity " 150 minutes per week as tolerated. (example brisk walk for 30 minutes, 5 days a week).     Nutrition: Discussed nutrition plan with patient. Information shared in after visit summary. Goal is for a well balanced diet to enhance overall health.     Encounter for subsequent annual wellness visit (AWV) in Medicare patient         Hypothyroidism, acquired    Orders:    levothyroxine (SYNTHROID, LEVOTHROID) 75 MCG tablet; Take 1 tablet by mouth Daily.    TSH; Future    T4, Free; Future    Primary hypertension  Hypertension is stable and controlled  Continue current treatment regimen.  Blood pressure will be reassessed in 6 months.    Orders:    losartan (COZAAR) 50 MG tablet; Take 1 tablet by mouth Daily. for blood pressure---make appt    metoprolol succinate XL (TOPROL-XL) 25 MG 24 hr tablet; Take 1 tablet by mouth Daily. TAKE 1 TABLET ONE TIME DAILY FOR HEART    Type 2 diabetes mellitus without complication, without long-term current use of insulin  Diabetes is stable.   Continue current treatment regimen.  Diabetes will be reassessed in 6 months    Orders:    Semaglutide, 2 MG/DOSE, (OZEMPIC) 8 MG/3ML solution pen-injector; Inject 2 mg under the skin into the appropriate area as directed 1 (One) Time Per Week.    Basic Metabolic Panel; Future    Hemoglobin A1c; Future    Lipid Panel; Future    Microalbumin / Creatinine Urine Ratio - Urine, Clean Catch; Future    Generalized anxiety disorder  Psychological condition is stable.  Continue current treatment regimen.  Psychological condition  will be reassessed in 6 months.    Orders:    sertraline (ZOLOFT) 50 MG tablet; Take 1 tablet by mouth Daily. TAKE 1 TABLET EVERY DAY FOR STRESS          I spent 15 minutes caring for Jessica on this date of service. This time includes time spent by me in the following activities:preparing for the visit, reviewing tests, performing a medically appropriate examination and/or evaluation , ordering medications, tests, or procedures,  documenting information in the medical record, and independently interpreting results and communicating that information with the patient/family/caregiver  Follow Up   Return in about 6 months (around 9/17/2025) for Recheck.  Patient was given instructions and counseling regarding her condition or for health maintenance advice. Please see specific information pulled into the AVS if appropriate.     Initial (On Arrival)

## 2025-03-15 NOTE — ASSESSMENT & PLAN NOTE
Diabetes is stable.   Continue current treatment regimen.  Diabetes will be reassessed in 6 months    Orders:    Semaglutide, 2 MG/DOSE, (OZEMPIC) 8 MG/3ML solution pen-injector; Inject 2 mg under the skin into the appropriate area as directed 1 (One) Time Per Week.    Basic Metabolic Panel; Future    Hemoglobin A1c; Future    Lipid Panel; Future    Microalbumin / Creatinine Urine Ratio - Urine, Clean Catch; Future

## 2025-03-15 NOTE — ASSESSMENT & PLAN NOTE
Orders:    levothyroxine (SYNTHROID, LEVOTHROID) 75 MCG tablet; Take 1 tablet by mouth Daily.    TSH; Future    T4, Free; Future

## 2025-03-15 NOTE — ASSESSMENT & PLAN NOTE
Psychological condition is stable.  Continue current treatment regimen.  Psychological condition  will be reassessed in 6 months.    Orders:    sertraline (ZOLOFT) 50 MG tablet; Take 1 tablet by mouth Daily. TAKE 1 TABLET EVERY DAY FOR STRESS

## 2025-03-17 ENCOUNTER — OFFICE VISIT (OUTPATIENT)
Dept: FAMILY MEDICINE CLINIC | Facility: CLINIC | Age: 80
End: 2025-03-17
Payer: MEDICARE

## 2025-03-17 VITALS
TEMPERATURE: 97.8 F | RESPIRATION RATE: 14 BRPM | BODY MASS INDEX: 42.56 KG/M2 | DIASTOLIC BLOOD PRESSURE: 72 MMHG | HEIGHT: 63 IN | WEIGHT: 240.2 LBS | OXYGEN SATURATION: 98 % | HEART RATE: 66 BPM | SYSTOLIC BLOOD PRESSURE: 124 MMHG

## 2025-03-17 DIAGNOSIS — I10 PRIMARY HYPERTENSION: Chronic | ICD-10-CM

## 2025-03-17 DIAGNOSIS — E03.9 HYPOTHYROIDISM, ACQUIRED: Chronic | ICD-10-CM

## 2025-03-17 DIAGNOSIS — F41.1 GENERALIZED ANXIETY DISORDER: Chronic | ICD-10-CM

## 2025-03-17 DIAGNOSIS — Z00.00 ENCOUNTER FOR SUBSEQUENT ANNUAL WELLNESS VISIT (AWV) IN MEDICARE PATIENT: Primary | ICD-10-CM

## 2025-03-17 DIAGNOSIS — E11.9 TYPE 2 DIABETES MELLITUS WITHOUT COMPLICATION, WITHOUT LONG-TERM CURRENT USE OF INSULIN: Chronic | ICD-10-CM

## 2025-03-17 PROCEDURE — 1170F FXNL STATUS ASSESSED: CPT | Performed by: FAMILY MEDICINE

## 2025-03-17 PROCEDURE — 3078F DIAST BP <80 MM HG: CPT | Performed by: FAMILY MEDICINE

## 2025-03-17 PROCEDURE — 96160 PT-FOCUSED HLTH RISK ASSMT: CPT | Performed by: FAMILY MEDICINE

## 2025-03-17 PROCEDURE — 3074F SYST BP LT 130 MM HG: CPT | Performed by: FAMILY MEDICINE

## 2025-03-17 PROCEDURE — 99214 OFFICE O/P EST MOD 30 MIN: CPT | Performed by: FAMILY MEDICINE

## 2025-03-17 PROCEDURE — 1160F RVW MEDS BY RX/DR IN RCRD: CPT | Performed by: FAMILY MEDICINE

## 2025-03-17 PROCEDURE — 1126F AMNT PAIN NOTED NONE PRSNT: CPT | Performed by: FAMILY MEDICINE

## 2025-03-17 PROCEDURE — G0439 PPPS, SUBSEQ VISIT: HCPCS | Performed by: FAMILY MEDICINE

## 2025-03-17 PROCEDURE — 1159F MED LIST DOCD IN RCRD: CPT | Performed by: FAMILY MEDICINE

## 2025-03-17 RX ORDER — METOPROLOL SUCCINATE 25 MG/1
25 TABLET, EXTENDED RELEASE ORAL DAILY
Qty: 90 TABLET | Refills: 1 | Status: SHIPPED | OUTPATIENT
Start: 2025-03-17

## 2025-03-17 RX ORDER — LOSARTAN POTASSIUM 50 MG/1
50 TABLET ORAL DAILY
Qty: 90 TABLET | Refills: 1 | Status: SHIPPED | OUTPATIENT
Start: 2025-03-17

## 2025-03-17 RX ORDER — EZETIMIBE 10 MG/1
10 TABLET ORAL DAILY
Qty: 90 TABLET | Refills: 1 | Status: SHIPPED | OUTPATIENT
Start: 2025-03-17

## 2025-03-17 RX ORDER — LEVOTHYROXINE SODIUM 75 UG/1
75 TABLET ORAL DAILY
Qty: 90 TABLET | Refills: 1 | Status: SHIPPED | OUTPATIENT
Start: 2025-03-17

## 2025-03-17 NOTE — PATIENT INSTRUCTIONS
Medicare Wellness  Personal Prevention Plan of Service     Date of Office Visit:    Encounter Provider:  Ishmael Morgan MD  Place of Service:  [facility]  Patient Name: Jessica Ames  :  1945    As part of the Medicare Wellness portion of your visit today, we are providing you with this personalized preventive plan of services (PPPS). This plan is based upon recommendations of the [place] Preventive Services Task Force (USPSTF) and the Advisory Committee on Immunization Practices (ACIP).    This lists the preventive care services that should be considered, and provides dates of when you are due. Items listed as completed are up-to-date and do not require any further intervention.    Health Maintenance   Topic Date Due    DIABETIC FOOT EXAM  Never done    URINE MICROALBUMIN-CREATININE RATIO (uACR)  08/15/2024    DIABETIC EYE EXAM  2025    ZOSTER VACCINE (2 of 2) 2025 (Originally 10/12/2017)    COVID-19 Vaccine ( season) 2025    HEMOGLOBIN A1C  2025    DXA SCAN  2025    BMI FOLLOWUP  2026    LIPID PANEL  2026    ANNUAL WELLNESS VISIT  2026    COLORECTAL CANCER SCREENING  2026    TDAP/TD VACCINES (3 - Td or Tdap) 2028    RSV Vaccine - Adults  Completed    INFLUENZA VACCINE  Completed    Pneumococcal Vaccine 50+  Completed    HEPATITIS C SCREENING  Addressed    MAMMOGRAM  Discontinued       No orders of the defined types were placed in this encounter.      Return in about 6 months (around 2025) for Recheck.

## 2025-04-25 ENCOUNTER — OFFICE VISIT (OUTPATIENT)
Dept: ORTHOPEDIC SURGERY | Facility: CLINIC | Age: 80
End: 2025-04-25
Payer: MEDICARE

## 2025-04-25 VITALS — BODY MASS INDEX: 43.04 KG/M2 | WEIGHT: 242.9 LBS | TEMPERATURE: 97.5 F | HEIGHT: 63 IN

## 2025-04-25 DIAGNOSIS — M19.011 PRIMARY OSTEOARTHRITIS OF RIGHT SHOULDER: ICD-10-CM

## 2025-04-25 DIAGNOSIS — G89.29 CHRONIC RIGHT SHOULDER PAIN: Primary | ICD-10-CM

## 2025-04-25 DIAGNOSIS — M25.511 CHRONIC RIGHT SHOULDER PAIN: Primary | ICD-10-CM

## 2025-04-25 DIAGNOSIS — M75.101 TEAR OF RIGHT ROTATOR CUFF, UNSPECIFIED TEAR EXTENT, UNSPECIFIED WHETHER TRAUMATIC: ICD-10-CM

## 2025-04-25 RX ORDER — LIDOCAINE HYDROCHLORIDE 10 MG/ML
2 INJECTION, SOLUTION EPIDURAL; INFILTRATION; INTRACAUDAL; PERINEURAL
Status: COMPLETED | OUTPATIENT
Start: 2025-04-25 | End: 2025-04-25

## 2025-04-25 RX ORDER — METHYLPREDNISOLONE ACETATE 80 MG/ML
1 INJECTION, SUSPENSION INTRA-ARTICULAR; INTRALESIONAL; INTRAMUSCULAR; SOFT TISSUE
Status: COMPLETED | OUTPATIENT
Start: 2025-04-25 | End: 2025-04-25

## 2025-04-25 RX ADMIN — LIDOCAINE HYDROCHLORIDE 2 ML: 10 INJECTION, SOLUTION EPIDURAL; INFILTRATION; INTRACAUDAL; PERINEURAL at 13:30

## 2025-04-25 RX ADMIN — METHYLPREDNISOLONE ACETATE 1 ML: 80 INJECTION, SUSPENSION INTRA-ARTICULAR; INTRALESIONAL; INTRAMUSCULAR; SOFT TISSUE at 13:30

## 2025-04-25 NOTE — PROGRESS NOTES
Ms. Ames comes in today for follow-up.  Injections have worked well in the past.  The patient would like to get a repeat injection today.  The risks, benefits and alternatives were discussed, particularly elevated risk with respect to her diabetes.  Going forward, the patient will follow-up as needed.    MIRA Esqueda    04/25/2025      Large Joint Arthrocentesis: R glenohumeral  Date/Time: 4/25/2025 1:30 PM  Consent given by: patient  Site marked: site marked  Timeout: Immediately prior to procedure a time out was called to verify the correct patient, procedure, equipment, support staff and site/side marked as required   Supporting Documentation  Indications: pain   Procedure Details  Location: shoulder - R glenohumeral  Preparation: Patient was prepped and draped in the usual sterile fashion  Needle gauge: 21G.  Approach: anterior  Medications administered: 1 mL methylPREDNISolone acetate 80 MG/ML; 2 mL lidocaine PF 1% 1 %  Patient tolerance: patient tolerated the procedure well with no immediate complications

## 2025-05-12 ENCOUNTER — HOSPITAL ENCOUNTER (OUTPATIENT)
Dept: CT IMAGING | Facility: HOSPITAL | Age: 80
Discharge: HOME | End: 2025-05-12
Admitting: FAMILY MEDICINE
Payer: MEDICARE

## 2025-05-12 DIAGNOSIS — I10 PRIMARY HYPERTENSION: Chronic | ICD-10-CM

## 2025-05-12 DIAGNOSIS — R91.1 PULMONARY NODULE: ICD-10-CM

## 2025-05-12 PROCEDURE — 71250 CT THORAX DX C-: CPT

## 2025-05-12 RX ORDER — LOSARTAN POTASSIUM 50 MG/1
TABLET ORAL
Qty: 90 TABLET | Refills: 3 | OUTPATIENT
Start: 2025-05-12

## 2025-05-19 DIAGNOSIS — R91.1 PULMONARY NODULE: Primary | ICD-10-CM

## 2025-05-19 DIAGNOSIS — I28.8 ENLARGED PULMONARY ARTERY: ICD-10-CM

## 2025-05-21 ENCOUNTER — OFFICE VISIT (OUTPATIENT)
Dept: ORTHOPEDIC SURGERY | Facility: CLINIC | Age: 80
End: 2025-05-21
Payer: MEDICARE

## 2025-05-21 VITALS — HEIGHT: 63 IN | WEIGHT: 245 LBS | BODY MASS INDEX: 43.41 KG/M2 | TEMPERATURE: 98.6 F

## 2025-05-21 DIAGNOSIS — R52 PAIN: Primary | ICD-10-CM

## 2025-05-21 DIAGNOSIS — M70.61 GREATER TROCHANTERIC BURSITIS OF RIGHT HIP: ICD-10-CM

## 2025-05-22 RX ORDER — METHYLPREDNISOLONE ACETATE 80 MG/ML
80 INJECTION, SUSPENSION INTRA-ARTICULAR; INTRALESIONAL; INTRAMUSCULAR; SOFT TISSUE
Status: COMPLETED | OUTPATIENT
Start: 2025-05-22 | End: 2025-05-22

## 2025-05-22 RX ADMIN — METHYLPREDNISOLONE ACETATE 80 MG: 80 INJECTION, SUSPENSION INTRA-ARTICULAR; INTRALESIONAL; INTRAMUSCULAR; SOFT TISSUE at 13:09

## 2025-05-22 NOTE — PROGRESS NOTES
Patient: Jessica Ames  YOB: 1945  Date of Service: 5/22/2025    Chief Complaints:   Chief Complaint   Patient presents with    Right Hip - Follow-up       Subjective: Patient is going [place] she has a history of lateral hip pain worse on the right would like to have conservative treatment today so she can enjoy and walk and be more ambulatory     History of Present Illness: Pt is seen in the office today with complaints of   Chief Complaint   Patient presents with    Right Hip - Follow-up   .  HIP: TIMING:  The pain is described as ACUTE ON CHRONIC.  AGGRAVATING FACTORS:  Is worsened by prolonged standing, sitting, and walking activities.  CHARACTERISTICS:  aching, stiffness, and difficulty walking.    This problem is not new to this examiner.     Allergies:   Allergies   Allergen Reactions    Adenosine Other (See Comments)     Paralyzed lungs and vocal chords    Lisinopril Cough    Other Unknown - High Severity and Unknown - Low Severity     ANTI-INFLAMMATORY      Celecoxib Palpitations     LE EDEMA    Naproxen Palpitations     LE EDEMA    ALL NSAIDS    Risedronate Swelling and Palpitations     LEG EDEMA    Sulfa Antibiotics Hives       Medications:   Home Medications:  Current Outpatient Medications on File Prior to Visit   Medication Sig    Acetaminophen (TYLENOL PO) Take  by mouth Daily As Needed.    Blood Glucose Monitoring Suppl device 1 each Take As Directed.    Cholecalciferol 2000 units capsule Take 2,000 Units by mouth Daily. One PO daily    Cyanocobalamin (VITAMIN B-12) 1000 MCG sublingual tablet Place 1,000 mcg under the tongue Daily. One SL daily (Patient taking differently: Place 1 tablet under the tongue Daily. TWICE A WEEK)    Eliquis 5 MG tablet tablet TAKE 1 TABLET EVERY 12 HOURS    ezetimibe (ZETIA) 10 MG tablet Take 1 tablet by mouth Daily. for cholesterol    glucose blood test strip Use as instructed    Lancets (freestyle) lancets Check BS fasting and HS daily     levothyroxine (SYNTHROID, LEVOTHROID) 75 MCG tablet Take 1 tablet by mouth Daily.    losartan (COZAAR) 50 MG tablet Take 1 tablet by mouth Daily. for blood pressure---make appt    metoprolol succinate XL (TOPROL-XL) 25 MG 24 hr tablet Take 1 tablet by mouth Daily. TAKE 1 TABLET ONE TIME DAILY FOR HEART    pantoprazole (PROTONIX) 40 MG EC tablet Take 1 tablet by mouth 2 (Two) Times a Day.    propafenone (RYTHMOL) 150 MG tablet Take 1 tablet by mouth 2 (Two) Times a Day.    Semaglutide, 2 MG/DOSE, (OZEMPIC) 8 MG/3ML solution pen-injector Inject 2 mg under the skin into the appropriate area as directed 1 (One) Time Per Week.    sertraline (ZOLOFT) 50 MG tablet Take 1 tablet by mouth Daily. TAKE 1 TABLET EVERY DAY FOR STRESS     No current facility-administered medications on file prior to visit.     Current Medications:  Scheduled Meds:  Continuous Infusions:No current facility-administered medications for this visit.    PRN Meds:.    I have reviewed the patient's medical history in detail and updated the computerized patient record.  Review and summarization of old records include:    Past Medical History:   Diagnosis Date    Abnormal ECG 2005    Allergic 1963    Ankle sprain     Arthritis     Arthritis of back 2000    Degenerative arthritis in lower back    Arthritis of neck     Bleeding disorder 2013    [place] factor 5    Bursitis of hip March 2022    Cataract 2015    Cholelithiasis Oct. 2022    Clotting disorder 2013    Coronary artery disease 2015    CTS (carpal tunnel syndrome)     Depression 2021    Diabetes mellitus 2022    Disease of thyroid gland     DVT (deep venous thrombosis) 2013    right leg; s/p knee replacement; was on xarelto and now baby aspirin; followed by Dr. Parminder Mejia      Essential (primary) hypertension 08/17/2016    Factor V Leiden     Fibrocystic breast 1986    First degree AV block 12/07/2012    Fracture of wrist     Fracture, finger     Fracture, foot     GERD (gastroesophageal reflux  "disease) 2012    Heart murmur     Hematuria, microscopic     Negative urological workup with Dr. Concepcion 6/3/2024    Hiatal hernia     Hip arthrosis     History of colon polyps     \"9 REMOVED\"    History of medical problems Polyps removed from stomach        Hypercholesterolemia     unknown    Hyperlipidemia     Hyperlipidemia 2016    Hypertension     Hypothyroidism 1982    Hypothyroidism, unspecified 2016    IFG (impaired fasting glucose)     Iron deficiency anemia     sees Dr. Clem Bowman     Knee sprain     Knee swelling     Knees replaced ‘13 & ‘14    Lactose intolerance     Left anterior fascicular block 2012    Low back strain 2000    Lower extremity edema     Neck strain     Obesity 2012    ERIC (obstructive sleep apnea)     compliant with CPAP machine     Osteoarthrosis 2016    leg    Osteopenia     PAF (paroxysmal atrial fibrillation) 2015    day after colonoscopy went into atrial fibrillation; was on Xarelto and now baby aspirin     Periarthritis of shoulder     Spurs removed in both shoulders    Phlebitis     unknown    Postphlebitic syndrome without complication 2016    Right Lower Extremity    Pulmonary embolism     also had DVT     PVC's (premature ventricular contractions) 2012    Rectal bleeding     Rotator cuff syndrome Both shoulders torn    Surgery on both    Sleep apnea     unknown    SSS (sick sinus syndrome)     Stress fracture     Tendinitis of knee     Thyroid disease     unknown    Venous insufficiency     followed by Dr. Parminder Mejia     Wrist sprain         Past Surgical History:   Procedure Laterality Date    BREAST SURGERY      reduction    CARDIAC CATHETERIZATION  3/2013    CATH LAB PROCEDURE       SECTION      CHOLECYSTECTOMY  Oct. 2022    CHOLECYSTECTOMY WITH INTRAOPERATIVE CHOLANGIOGRAM N/A 10/28/2022    Procedure: CHOLECYSTECTOMY LAPAROSCOPIC INTRAOPERATIVE CHOLANGIOGRAM;  Surgeon: Melecio Gan " MD MARIAELENA;  Location: [facility] MAIN OR;  Service: General;  Laterality: N/A;    COLONOSCOPY N/A 2018    dr. torres    COLONOSCOPY  2017    ENDOSCOPY N/A 12/17/2024    Procedure: ESOPHAGOGASTRODUODENOSCOPY with cold biopsies and #56 ft Wolf & 15-18mm balloon dilation;  Surgeon: Baldemar Cannon MD;  Location:  [facility];  Service: Gastroenterology;  Laterality: N/A;  Pre - dysphagia.  Post - hiatal hernia    EYE SURGERY  2015    HAND SURGERY  2005    joint removal    HEMORRHOIDECTOMY N/A 1986    HYSTERECTOMY  1984    JOINT REPLACEMENT  Both knees    2013 & 2014    KNEE SURGERY  01/2013    LUMBAR EPIDURAL INJECTION N/A 10/17/2023    Pt had A Fib episode later that day    REDUCTION MAMMAPLASTY  1986    ROTATOR CUFF REPAIR      SHOULDER SURGERY Bilateral 1996    TOTAL KNEE ARTHROPLASTY      TRIGGER POINT INJECTION      UPPER GASTROINTESTINAL ENDOSCOPY  2018    13 polyps removed from stomach.. dr torres    UPPER GASTROINTESTINAL ENDOSCOPY  11/30/2022        VEIN SURGERY      WRIST SURGERY          Social History     Occupational History    Not on file   Tobacco Use    Smoking status: Never     Passive exposure: Never    Smokeless tobacco: Never    Tobacco comments:     minimal caffeine; Patient does not smoke.   Vaping Use    Vaping status: Never Used   Substance and Sexual Activity    Alcohol use: Not Currently     Comment: Maybe a glass of wine once a month    Drug use: Never     Comment: Drug Abuse: none    Sexual activity: Yes     Partners: Male     Birth control/protection: Post-menopausal     Comment: Occasionally      Social History     Social History Narrative    Not on file        Family History   Problem Relation Age of Onset    Stroke Mother     Diabetes Mother     Hypertension Mother     Uterine cancer Mother     Arthritis Mother     Cancer Mother     Miscarriages / Stillbirths Mother     Osteoporosis Mother     Clotting disorder Mother     Hypertension Father     Heart attack Father      Emphysema Father     Alcohol abuse Father     COPD Father     Diabetes Father     Heart disease Father     Obesity Father     Diabetes Sister     Hypertension Sister     Hearing loss Sister     Thyroid disease Sister     Hyperlipidemia Sister     Anemia Sister     Stroke Brother     Diabetes Brother     Hypertension Brother     Mental illness Maternal Grandmother     Diabetes Other     Diabetes Sister     Hearing loss Sister     Hyperlipidemia Sister     Hypertension Sister     Thyroid disease Sister     Anemia Sister     Obesity Sister     Sleep apnea Sister     Diabetes Brother     Hypertension Brother     Stroke Brother     Obesity Brother     Sleep apnea Brother     Sleep disorder Sister     Thyroid disease Sister     Malig Hyperthermia Neg Hx        ROS: 14 point review of systems was performed and was negative except for documented findings in HPI and today's encounter.     Allergies:   Allergies   Allergen Reactions    Adenosine Other (See Comments)     Paralyzed lungs and vocal chords    Lisinopril Cough    Other Unknown - High Severity and Unknown - Low Severity     ANTI-INFLAMMATORY      Celecoxib Palpitations     LE EDEMA    Naproxen Palpitations     LE EDEMA    ALL NSAIDS    Risedronate Swelling and Palpitations     LEG EDEMA    Sulfa Antibiotics Hives     Constitutional:  Denies fever, shaking or chills   Eyes:  Denies change in visual acuity   HENT:  Denies nasal congestion or sore throat   Respiratory:  Denies cough or shortness of breath   Cardiovascular:  Denies chest pain or severe LE edema   GI:  Denies abdominal pain, nausea, vomiting, bloody stools or diarrhea   Musculoskeletal:  Numbness, tingling, or loss of motor function only as noted above in history of present illness.  : Denies painful urination or hematuria  Integument:  Denies rash, lesion or ulceration   Neurologic:  Denies headache or focal weakness  Endocrine:  Denies lymphadenopathy  Psych:  Denies confusion or change in mental  status   Hem:  Denies active bleeding      Physical Exam: 79 y.o. female  Wt Readings from Last 3 Encounters:   05/21/25 111 kg (245 lb)   04/25/25 110 kg (242 lb 14.4 oz)   03/17/25 109 kg (240 lb 3.2 oz)         Body mass index is 43.4 kg/m².  Facility age limit for growth %tiffany is 20 years.  Vitals:    05/21/25 1545   Temp: 98.6 °F (37 °C)     Vital signs reviewed.   General Appearance:    Alert, cooperative, in no acute distress                  Eyes: conjunctiva clear  ENT: external ears and nose atraumatic  CV: no peripheral edema  Resp: normal respiratory effort  Skin: no rashes or wounds; normal turgor  Psych: mood and affect appropriate  Lymph: no nodes appreciated  Neuro: gross sensation intact  Vascular:  Palpable peripheral pulse in noted extremity  Musculoskeletal Extremities: HIP Exam: normal gait without assistive device right hip 2+ pedal pulses and brisk capillary refill Pedal edema none      Diagnostic Data:  Imaging done today and discussed with the patient:    Indication: pain related symptoms,  Views: 3V  AP, LAT & OBLIQUE right hip(s)   Findings: moderate arthritis  Comparison views: viewed last xray done in the office.      Procedure:  Large Joint Arthrocentesis: R greater trochanteric bursa  Date/Time: 5/22/2025 1:09 PM  Consent given by: patient  Site marked: site marked  Timeout: Immediately prior to procedure a time out was called to verify the correct patient, procedure, equipment, support staff and site/side marked as required   Supporting Documentation  Indications: pain   Procedure Details  Location: hip - R greater trochanteric bursa  Preparation: Patient was prepped and draped in the usual sterile fashion  Needle gauge: 21 G.  Approach: lateral  Medications administered: 2 mL lidocaine (cardiac); 80 mg methylPREDNISolone acetate 80 MG/ML  Patient tolerance: patient tolerated the procedure well with no immediate complications        Assessment:     ICD-10-CM ICD-9-CM   1. Pain  R52  780.96   2. Greater trochanteric bursitis of right hip  M70.61 726.5       Plan:   Follow up as indicated.  Ice, elevate, and rest as needed.  The diagnosis(es), natural history, pathophysiology and treatment for diagnosis(es) were discussed. Opportunity given and questions answered.  Biomechanics of pertinent body areas discussed.  When appropriate, the use of ambulatory aids discussed.  BMI:  The concept of BMI body mass index and its importance and implications discussed.    EXERCISES:  Advice on benefits of, and types of regular/moderate exercise pertaining to orthopedic diagnosis(es).  MEDICATIONS:  The risks, benefits, warnings,side effects and alternatives of medications discussed.  Inflammation/pain control; with cold, heat, elevation and/or liniments discussed as appropriate  Cortisone Injection. See procedure note.  HOME EXERCISE/PT program encouraged  MEDICAL RECORDS reviewed from other provider(s) for past and current medical history pertinent to this complaint.    5/22/2025

## 2025-06-03 RX ORDER — PROPAFENONE HYDROCHLORIDE 150 MG/1
150 TABLET, COATED ORAL 2 TIMES DAILY
Qty: 180 TABLET | Refills: 3 | Status: SHIPPED | OUTPATIENT
Start: 2025-06-03

## 2025-06-11 ENCOUNTER — TRANSCRIBE ORDERS (OUTPATIENT)
Dept: ADMINISTRATIVE | Facility: HOSPITAL | Age: 80
End: 2025-06-11
Payer: MEDICARE

## 2025-06-11 DIAGNOSIS — I27.20 PULMONARY HTN: Primary | ICD-10-CM

## 2025-06-11 RX ORDER — SCOPOLAMINE 1 MG/3D
1 PATCH, EXTENDED RELEASE TRANSDERMAL
Qty: 5 PATCH | Refills: 1 | Status: SHIPPED | OUTPATIENT
Start: 2025-06-11

## 2025-07-09 ENCOUNTER — HOSPITAL ENCOUNTER (OUTPATIENT)
Dept: CARDIOLOGY | Facility: HOSPITAL | Age: 80
Discharge: HOME | End: 2025-07-09
Admitting: INTERNAL MEDICINE
Payer: MEDICARE

## 2025-07-09 VITALS
BODY MASS INDEX: 43.41 KG/M2 | DIASTOLIC BLOOD PRESSURE: 69 MMHG | SYSTOLIC BLOOD PRESSURE: 160 MMHG | HEIGHT: 63 IN | HEART RATE: 70 BPM | WEIGHT: 245 LBS

## 2025-07-09 DIAGNOSIS — I27.20 PULMONARY HTN: ICD-10-CM

## 2025-07-09 LAB
AORTIC DIMENSIONLESS INDEX: 0.86 (DI)
ASCENDING AORTA: 3.9 CM
AV MEAN PRESS GRAD SYS DOP V1V2: 3 MMHG
AV VMAX SYS DOP: 122 CM/SEC
BH CV ECHO MEAS - ACS: 2.33 CM
BH CV ECHO MEAS - AI P1/2T: 558.6 MSEC
BH CV ECHO MEAS - AO MAX PG: 6 MMHG
BH CV ECHO MEAS - AO ROOT AREA (BSA CORRECTED): 1.6 CM2
BH CV ECHO MEAS - AO ROOT DIAM: 3.4 CM
BH CV ECHO MEAS - AO V2 VTI: 27.5 CM
BH CV ECHO MEAS - AVA(I,D): 2.8 CM2
BH CV ECHO MEAS - EDV(CUBED): 75 ML
BH CV ECHO MEAS - EDV(MOD-SP2): 86 ML
BH CV ECHO MEAS - EDV(MOD-SP4): 143 ML
BH CV ECHO MEAS - EF(MOD-SP2): 76.7 %
BH CV ECHO MEAS - EF(MOD-SP4): 67.8 %
BH CV ECHO MEAS - ESV(CUBED): 21.3 ML
BH CV ECHO MEAS - ESV(MOD-SP2): 20 ML
BH CV ECHO MEAS - ESV(MOD-SP4): 46 ML
BH CV ECHO MEAS - FS: 34.3 %
BH CV ECHO MEAS - IVS/LVPW: 0.93 CM
BH CV ECHO MEAS - IVSD: 0.97 CM
BH CV ECHO MEAS - LAT PEAK E' VEL: 5.2 CM/SEC
BH CV ECHO MEAS - LV DIASTOLIC VOL/BSA (35-75): 67.8 CM2
BH CV ECHO MEAS - LV MASS(C)D: 138.7 GRAMS
BH CV ECHO MEAS - LV MAX PG: 5.1 MMHG
BH CV ECHO MEAS - LV MEAN PG: 3 MMHG
BH CV ECHO MEAS - LV SYSTOLIC VOL/BSA (12-30): 21.8 CM2
BH CV ECHO MEAS - LV V1 MAX: 113 CM/SEC
BH CV ECHO MEAS - LV V1 VTI: 23.7 CM
BH CV ECHO MEAS - LVIDD: 4.2 CM
BH CV ECHO MEAS - LVIDS: 2.8 CM
BH CV ECHO MEAS - LVOT AREA: 3.3 CM2
BH CV ECHO MEAS - LVOT DIAM: 2.04 CM
BH CV ECHO MEAS - LVPWD: 1.04 CM
BH CV ECHO MEAS - MED PEAK E' VEL: 3.7 CM/SEC
BH CV ECHO MEAS - MR MAX PG: 31.4 MMHG
BH CV ECHO MEAS - MR MAX VEL: 280.4 CM/SEC
BH CV ECHO MEAS - MV A DUR: 0.15 SEC
BH CV ECHO MEAS - MV A MAX VEL: 83.8 CM/SEC
BH CV ECHO MEAS - MV DEC SLOPE: 445 CM/SEC2
BH CV ECHO MEAS - MV DEC TIME: 0.26 SEC
BH CV ECHO MEAS - MV E MAX VEL: 57.6 CM/SEC
BH CV ECHO MEAS - MV E/A: 0.69
BH CV ECHO MEAS - MV MAX PG: 3.6 MMHG
BH CV ECHO MEAS - MV MEAN PG: 1.06 MMHG
BH CV ECHO MEAS - MV P1/2T: 45.4 MSEC
BH CV ECHO MEAS - MV V2 VTI: 20.9 CM
BH CV ECHO MEAS - MVA(P1/2T): 4.8 CM2
BH CV ECHO MEAS - MVA(VTI): 3.7 CM2
BH CV ECHO MEAS - PA V2 MAX: 111.2 CM/SEC
BH CV ECHO MEAS - PULM A REVS DUR: 0.13 SEC
BH CV ECHO MEAS - PULM A REVS VEL: 29.1 CM/SEC
BH CV ECHO MEAS - PULM DIAS VEL: 34.2 CM/SEC
BH CV ECHO MEAS - PULM S/D: 2.35
BH CV ECHO MEAS - PULM SYS VEL: 80.2 CM/SEC
BH CV ECHO MEAS - QP/QS: 0.67
BH CV ECHO MEAS - RAP SYSTOLE: 3 MMHG
BH CV ECHO MEAS - RV MAX PG: 2.06 MMHG
BH CV ECHO MEAS - RV V1 MAX: 71.8 CM/SEC
BH CV ECHO MEAS - RV V1 VTI: 16.5 CM
BH CV ECHO MEAS - RVOT DIAM: 2 CM
BH CV ECHO MEAS - RVSP: 28 MMHG
BH CV ECHO MEAS - SV(LVOT): 77.7 ML
BH CV ECHO MEAS - SV(MOD-SP2): 66 ML
BH CV ECHO MEAS - SV(MOD-SP4): 97 ML
BH CV ECHO MEAS - SV(RVOT): 51.7 ML
BH CV ECHO MEAS - SVI(LVOT): 36.9 ML/M2
BH CV ECHO MEAS - SVI(MOD-SP2): 31.3 ML/M2
BH CV ECHO MEAS - SVI(MOD-SP4): 46 ML/M2
BH CV ECHO MEAS - TAPSE (>1.6): 2.2 CM
BH CV ECHO MEAS - TR MAX PG: 25.4 MMHG
BH CV ECHO MEAS - TR MAX VEL: 251.9 CM/SEC
BH CV ECHO MEASUREMENTS AVERAGE E/E' RATIO: 12.94
BH CV XLRA - RV BASE: 3.4 CM
BH CV XLRA - RV LENGTH: 6.2 CM
BH CV XLRA - RV MID: 2.9 CM
BH CV XLRA - TDI S': 10.4 CM/SEC
LEFT ATRIUM VOLUME INDEX: 31.6 ML/M2
LV EF BIPLANE MOD: 72.6 %
SINUS: 3.5 CM
STJ: 3.3 CM

## 2025-07-09 PROCEDURE — 93306 TTE W/DOPPLER COMPLETE: CPT

## 2025-07-09 PROCEDURE — 25510000001 PERFLUTREN 6.52 MG/ML SUSPENSION 2 ML VIAL: Performed by: INTERNAL MEDICINE

## 2025-07-09 RX ADMIN — PERFLUTREN 1 ML: 6.52 INJECTION, SUSPENSION INTRAVENOUS at 15:30

## 2025-07-28 DIAGNOSIS — I10 PRIMARY HYPERTENSION: Chronic | ICD-10-CM

## 2025-07-28 DIAGNOSIS — F41.1 GENERALIZED ANXIETY DISORDER: Chronic | ICD-10-CM

## 2025-07-28 RX ORDER — LOSARTAN POTASSIUM 50 MG/1
50 TABLET ORAL DAILY
Qty: 90 TABLET | Refills: 3 | Status: SHIPPED | OUTPATIENT
Start: 2025-07-28

## 2025-07-31 RX ORDER — EZETIMIBE 10 MG/1
10 TABLET ORAL DAILY
Qty: 90 TABLET | Refills: 3 | Status: SHIPPED | OUTPATIENT
Start: 2025-07-31

## 2025-08-11 ENCOUNTER — OFFICE VISIT (OUTPATIENT)
Dept: ORTHOPEDIC SURGERY | Facility: CLINIC | Age: 80
End: 2025-08-11
Payer: MEDICARE

## 2025-08-11 VITALS — HEIGHT: 64 IN | WEIGHT: 244.6 LBS | TEMPERATURE: 98.3 F | BODY MASS INDEX: 41.76 KG/M2

## 2025-08-11 DIAGNOSIS — M25.511 CHRONIC RIGHT SHOULDER PAIN: Primary | ICD-10-CM

## 2025-08-11 DIAGNOSIS — M19.011 ARTHRITIS OF RIGHT SHOULDER: ICD-10-CM

## 2025-08-11 DIAGNOSIS — G89.29 CHRONIC RIGHT SHOULDER PAIN: Primary | ICD-10-CM

## 2025-08-11 RX ORDER — METHYLPREDNISOLONE ACETATE 80 MG/ML
80 INJECTION, SUSPENSION INTRA-ARTICULAR; INTRALESIONAL; INTRAMUSCULAR; SOFT TISSUE
Status: COMPLETED | OUTPATIENT
Start: 2025-08-11 | End: 2025-08-11

## 2025-08-11 RX ORDER — LIDOCAINE HYDROCHLORIDE 10 MG/ML
2 INJECTION, SOLUTION EPIDURAL; INFILTRATION; INTRACAUDAL; PERINEURAL
Status: COMPLETED | OUTPATIENT
Start: 2025-08-11 | End: 2025-08-11

## 2025-08-11 RX ADMIN — LIDOCAINE HYDROCHLORIDE 2 ML: 10 INJECTION, SOLUTION EPIDURAL; INFILTRATION; INTRACAUDAL; PERINEURAL at 09:17

## 2025-08-11 RX ADMIN — METHYLPREDNISOLONE ACETATE 80 MG: 80 INJECTION, SUSPENSION INTRA-ARTICULAR; INTRALESIONAL; INTRAMUSCULAR; SOFT TISSUE at 09:17

## 2025-08-13 ENCOUNTER — TRANSCRIBE ORDERS (OUTPATIENT)
Dept: ADMINISTRATIVE | Facility: HOSPITAL | Age: 80
End: 2025-08-13
Payer: MEDICARE

## 2025-08-13 DIAGNOSIS — R91.1 LUNG NODULE: Primary | ICD-10-CM

## 2025-08-18 DIAGNOSIS — K21.9 GASTROESOPHAGEAL REFLUX DISEASE, UNSPECIFIED WHETHER ESOPHAGITIS PRESENT: ICD-10-CM

## 2025-08-19 RX ORDER — PANTOPRAZOLE SODIUM 40 MG/1
40 TABLET, DELAYED RELEASE ORAL 2 TIMES DAILY
Qty: 180 TABLET | Refills: 3 | Status: SHIPPED | OUTPATIENT
Start: 2025-08-19

## 2025-08-20 ENCOUNTER — OFFICE VISIT (OUTPATIENT)
Dept: ORTHOPEDIC SURGERY | Facility: CLINIC | Age: 80
End: 2025-08-20
Payer: MEDICARE

## 2025-08-20 VITALS — HEIGHT: 63 IN | WEIGHT: 240 LBS | BODY MASS INDEX: 42.52 KG/M2 | TEMPERATURE: 98 F

## 2025-08-20 DIAGNOSIS — M70.61 GREATER TROCHANTERIC BURSITIS OF RIGHT HIP: Primary | ICD-10-CM

## 2025-08-20 RX ADMIN — METHYLPREDNISOLONE ACETATE 80 MG: 80 INJECTION, SUSPENSION INTRA-ARTICULAR; INTRALESIONAL; INTRAMUSCULAR; SOFT TISSUE at 10:49

## 2025-08-21 ENCOUNTER — TELEPHONE (OUTPATIENT)
Dept: FAMILY MEDICINE CLINIC | Facility: CLINIC | Age: 80
End: 2025-08-21
Payer: MEDICARE

## 2025-08-21 DIAGNOSIS — I10 PRIMARY HYPERTENSION: Chronic | ICD-10-CM

## 2025-08-21 RX ORDER — METOPROLOL SUCCINATE 25 MG/1
TABLET, EXTENDED RELEASE ORAL
Qty: 90 TABLET | Refills: 3 | OUTPATIENT
Start: 2025-08-21

## 2025-08-21 RX ORDER — METHYLPREDNISOLONE ACETATE 80 MG/ML
80 INJECTION, SUSPENSION INTRA-ARTICULAR; INTRALESIONAL; INTRAMUSCULAR; SOFT TISSUE
Status: COMPLETED | OUTPATIENT
Start: 2025-08-20 | End: 2025-08-20

## (undated) DEVICE — 3M™ STERI-STRIP™ COMPOUND BENZOIN TINCTURE 40 BAGS/CARTON 4 CARTONS/CASE C1544: Brand: 3M™ STERI-STRIP™

## (undated) DEVICE — ENDOPATH XCEL BLADELESS TROCARS WITH STABILITY SLEEVES: Brand: ENDOPATH XCEL

## (undated) DEVICE — CATH IV INSYTE AUTOGARD 14G 1 1/2IN ORNG

## (undated) DEVICE — MSK PROC CURAPLEX O2 2/ADAPT 7FT

## (undated) DEVICE — GLV SURG BIOGEL LTX PF 8 1/2

## (undated) DEVICE — APPL CHLORAPREP HI/LITE 26ML ORNG

## (undated) DEVICE — TRAP FLD MINIVAC MEGADYNE 100ML

## (undated) DEVICE — DISPOSABLE MONOPOLAR ENDOSCOPIC CORD 10 FT. (3M): Brand: KIRWAN

## (undated) DEVICE — BLCK/BITE BLOX W/DENTL/RIM W/STRAP 54F

## (undated) DEVICE — SUT MNCRYL PLS ANTIB UD 4/0 PS2 18IN

## (undated) DEVICE — SENSR O2 OXIMAX FNGR A/ 18IN NONSTR

## (undated) DEVICE — TBG PENCL TELESCP MEGADYNE SMOKE EVAC 10FT

## (undated) DEVICE — ESOPHAGEAL/PYLORIC/COLONIC WIREGUIDED BALLOON DILATATION CATHETER: Brand: CRE WIREGUIDED

## (undated) DEVICE — LN SMPL CO2 SHTRM SD STREAM W/M LUER

## (undated) DEVICE — LAPAROSCOPIC DISSECTOR: Brand: DEROYAL

## (undated) DEVICE — DRAPE,REIN 53X77,STERILE: Brand: MEDLINE

## (undated) DEVICE — TUBING, SUCTION, 1/4" X 10', STRAIGHT: Brand: MEDLINE

## (undated) DEVICE — ENDOPOUCH RETRIEVER SPECIMEN RETRIEVAL BAGS: Brand: ENDOPOUCH RETRIEVER

## (undated) DEVICE — VIOLET BRAIDED (POLYGLACTIN 910), SYNTHETIC ABSORBABLE SUTURE: Brand: COATED VICRYL

## (undated) DEVICE — SOL NACL 0.9PCT 1000ML

## (undated) DEVICE — CATH CHOLANG 4.5F18IN BRGNDY

## (undated) DEVICE — DEV INFL ALLIANCE2 SYS

## (undated) DEVICE — EXTENSION SET, MALE LUER LOCK ADAPTER WITH RETRACTABLE COLLAR

## (undated) DEVICE — LAPAROVUE VISIBILITY SYSTEM LAPAROSCOPIC SOLUTIONS: Brand: LAPAROVUE

## (undated) DEVICE — LOU LAP CHOLE: Brand: MEDLINE INDUSTRIES, INC.

## (undated) DEVICE — ENDOPATH XCEL BLUNT TIP TROCARS WITH SMOOTH SLEEVES: Brand: ENDOPATH XCEL

## (undated) DEVICE — STPCK 3WY D201 DISCOFIX

## (undated) DEVICE — ENDOCUT SCISSOR TIP, DISPOSABLE: Brand: RENEW

## (undated) DEVICE — CONTAINER,SPECIMEN,OR STERILE,4OZ: Brand: MEDLINE

## (undated) DEVICE — ENDOPATH XCEL UNIVERSAL TROCAR STABLILITY SLEEVES: Brand: ENDOPATH XCEL

## (undated) DEVICE — LAPAROSCOPIC SMOKE FILTRATION SYSTEM: Brand: PALL LAPAROSHIELD® PLUS LAPAROSCOPIC SMOKE FILTRATION SYSTEM

## (undated) DEVICE — DRP C/ARM 41X74IN

## (undated) DEVICE — FRCP BX RADJAW4 NDL 2.8 240CM LG OG BX40

## (undated) DEVICE — ADAPT CLN BIOGUARD AIR/H2O DISP

## (undated) DEVICE — KT ORCA ORCAPOD DISP STRL